# Patient Record
Sex: MALE | Race: WHITE | NOT HISPANIC OR LATINO | Employment: OTHER | URBAN - NONMETROPOLITAN AREA
[De-identification: names, ages, dates, MRNs, and addresses within clinical notes are randomized per-mention and may not be internally consistent; named-entity substitution may affect disease eponyms.]

---

## 2017-01-03 ENCOUNTER — HISTORY (OUTPATIENT)
Dept: FAMILY MEDICINE | Facility: OTHER | Age: 67
End: 2017-01-03

## 2017-01-03 ENCOUNTER — OFFICE VISIT - GICH (OUTPATIENT)
Dept: FAMILY MEDICINE | Facility: OTHER | Age: 67
End: 2017-01-03

## 2017-01-03 DIAGNOSIS — R97.20 ELEVATED PROSTATE SPECIFIC ANTIGEN (PSA): ICD-10-CM

## 2017-01-03 DIAGNOSIS — E03.9 HYPOTHYROIDISM: ICD-10-CM

## 2017-01-03 DIAGNOSIS — B00.1 HERPESVIRAL VESICULAR DERMATITIS: ICD-10-CM

## 2017-01-03 DIAGNOSIS — K21.9 GASTRO-ESOPHAGEAL REFLUX DISEASE WITHOUT ESOPHAGITIS: ICD-10-CM

## 2017-01-03 DIAGNOSIS — I10 ESSENTIAL (PRIMARY) HYPERTENSION: ICD-10-CM

## 2017-01-03 DIAGNOSIS — Z00.00 ENCOUNTER FOR GENERAL ADULT MEDICAL EXAMINATION WITHOUT ABNORMAL FINDINGS: ICD-10-CM

## 2017-01-03 DIAGNOSIS — T75.3XXA MOTION SICKNESS: ICD-10-CM

## 2017-01-03 LAB
ANION GAP - HISTORICAL: 7 (ref 5–18)
BUN SERPL-MCNC: 21 MG/DL (ref 7–25)
BUN/CREAT RATIO - HISTORICAL: 15
CALCIUM SERPL-MCNC: 10.3 MG/DL (ref 8.6–10.3)
CHLORIDE SERPLBLD-SCNC: 106 MMOL/L (ref 98–107)
CO2 SERPL-SCNC: 25 MMOL/L (ref 21–31)
CREAT SERPL-MCNC: 1.41 MG/DL (ref 0.7–1.3)
GFR IF NOT AFRICAN AMERICAN - HISTORICAL: 50 ML/MIN/1.73M2
GLUCOSE SERPL-MCNC: 112 MG/DL (ref 70–105)
POTASSIUM SERPL-SCNC: 3.9 MMOL/L (ref 3.5–5.1)
PSA TOTAL (DIAGNOSTIC) - HISTORICAL: 4.53 NG/ML
SODIUM SERPL-SCNC: 138 MMOL/L (ref 133–143)
TSH - HISTORICAL: 1.14 UIU/ML (ref 0.34–5.6)

## 2017-01-03 ASSESSMENT — PATIENT HEALTH QUESTIONNAIRE - PHQ9: SUM OF ALL RESPONSES TO PHQ QUESTIONS 1-9: 1

## 2017-01-09 ENCOUNTER — HISTORY (OUTPATIENT)
Dept: UROLOGY | Facility: OTHER | Age: 67
End: 2017-01-09

## 2017-01-09 ENCOUNTER — OFFICE VISIT - GICH (OUTPATIENT)
Dept: UROLOGY | Facility: OTHER | Age: 67
End: 2017-01-09

## 2017-01-09 DIAGNOSIS — R39.12 POOR URINARY STREAM: ICD-10-CM

## 2017-01-09 DIAGNOSIS — N52.9 MALE ERECTILE DYSFUNCTION: ICD-10-CM

## 2017-01-09 DIAGNOSIS — R97.20 ELEVATED PROSTATE SPECIFIC ANTIGEN (PSA): ICD-10-CM

## 2017-01-09 DIAGNOSIS — N40.1 ENLARGED PROSTATE WITH LOWER URINARY TRACT SYMPTOMS (LUTS): ICD-10-CM

## 2017-01-18 ENCOUNTER — AMBULATORY - GICH (OUTPATIENT)
Dept: LAB | Facility: OTHER | Age: 67
End: 2017-01-18

## 2017-01-18 ENCOUNTER — AMBULATORY - GICH (OUTPATIENT)
Dept: FAMILY MEDICINE | Facility: OTHER | Age: 67
End: 2017-01-18

## 2017-01-18 DIAGNOSIS — Z20.818 CONTACT WITH AND (SUSPECTED) EXPOSURE TO OTHER BACTERIAL COMMUNICABLE DISEASES: ICD-10-CM

## 2017-01-18 LAB — STREP A ANTIGEN - HISTORICAL: NEGATIVE

## 2017-10-19 ENCOUNTER — COMMUNICATION - GICH (OUTPATIENT)
Dept: FAMILY MEDICINE | Facility: OTHER | Age: 67
End: 2017-10-19

## 2017-10-19 DIAGNOSIS — E03.9 HYPOTHYROIDISM: ICD-10-CM

## 2017-10-19 DIAGNOSIS — B00.1 HERPESVIRAL VESICULAR DERMATITIS: ICD-10-CM

## 2017-12-18 ENCOUNTER — AMBULATORY - GICH (OUTPATIENT)
Dept: UROLOGY | Facility: OTHER | Age: 67
End: 2017-12-18

## 2017-12-18 DIAGNOSIS — R97.20 ELEVATED PROSTATE SPECIFIC ANTIGEN (PSA): ICD-10-CM

## 2017-12-28 NOTE — TELEPHONE ENCOUNTER
Patient Information     Patient Name MRN David Deleon [id number] Male 1950      Telephone Encounter by Terri Us RN at 10/23/2017  2:57 PM     Author:  Terri Us RN Service:  (none) Author Type:  NURS- Registered Nurse     Filed:  10/23/2017  2:58 PM Encounter Date:  10/19/2017 Status:  Signed     :  Terri Us RN (NURS- Registered Nurse)            Filled 2017 #90 R3  Unable to complete prescription refill per RN Medication Refill Policy.................... Terri Us RN ....................  10/23/2017   2:58 PM        `

## 2018-01-02 NOTE — NURSING NOTE
Patient Information     Patient Name MRN Sex David Martin [id number] Male 1950      Nursing Note by Le Scott RN at 2017  9:15 AM     Author:  Le Scott RN Service:  (none) Author Type:  NURS- Registered Nurse     Filed:  2017  8:45 AM Encounter Date:  2017 Status:  Signed     :  Le Scott RN (NURS- Registered Nurse)            Review of Systems:    Weight loss:    No     Recent fever/chills:  No   Night sweats:   No  Current skin rash:  No   Recent hair loss:  No  Heat intolerance:  No   Cold intolerance:  No  Chest pain:   No   Palpitations:   No  Shortness of breath:  No   Wheezing:   No  Constipation:    No   Diarrhea:   No   Nausea:   No   Vomiting:   No   Kidney/side pain:  No   Back pain:   No  Frequent headaches:  No   Dizziness:     No  Leg swelling:   No   Calf pain:    No

## 2018-01-02 NOTE — PROGRESS NOTES
Patient Information     Patient Name MRN Sex David Martin [id number] Male 1950      Progress Notes by Fady Penaloza MD at 1/3/2017  8:49 AM     Author:  Fady Penaloza MD Service:  (none) Author Type:  Physician     Filed:  1/3/2017  1:06 PM Encounter Date:  1/3/2017 Status:  Signed     :  Fady Penaloza MD (Physician)              SUBJECTIVE:    David Samaniego is a 66 y.o. male who presents for px    HPI: He will be doing his first cruise soon and wants something for motion sickness.  Otherwise feels great.  No chest pain or shortness of breath at all.  Needs refills on all of his medications.  Weight is stable.    PROBLEM LIST:  Patient Active Problem List     Diagnosis  Code     HYPOTHYROIDISM E03.9     HYPERTENSION I10     GERD K21.9     Personal history of other specified diseases(V13.89) Z87.898     RENAL INSUFFICIENCY N25.9     RENAL DISEASE, CHRONIC, STAGE III N18.3     DISTURBANCE OF SKIN SENSATION R20.9     HYPERTENSION, LABILE I10     SKIN LESIONS, MULTIPLE L98.9     BACK PAIN M54.9     NODULAR PROSTATE WITH URINARY OBSTRUCTION N40.3, N13.8     SKIN LESION L98.9     ELEVATED PROSTATE SPECIFIC ANTIGEN R97.20     ACP (advance care planning) Z71.89     Lumbar disc herniation M51.26     Health care maintenance Z00.00     H/O adenomatous polyp of colon Z86.010     PAST MEDICAL HISTORY:  Past Medical History     Diagnosis  Date     Benign enlargement of prostate      Chronic kidney disease, stage III (moderate)      Disorder resulting from impaired renal function      Gastroesophageal reflux disease      Hypertension      Hypothyroid      Tortuous colon      SURGICAL HISTORY:  Past Surgical History       Procedure   Laterality Date     Partial thyroidectomy        Back surgery    and      Colonoscopy screening   05     next due        Anesthesia alert        Notes no prior complications from anesthesia.:        Colonoscopy diagnostic   10/26/15     F/U           SOCIAL HISTORY:  Social History     Social History        Marital status:       Spouse name: N/A     Number of children:  N/A     Years of education:  N/A     Occupational History      Not on file.     Social History Main Topics         Smoking status:   Never Smoker     Smokeless tobacco:   Never Used     Alcohol use   0.0 oz/week     0 Standard drinks or equivalent per week        Comment: occasionally      Drug use:   No     Sexual activity:   Not on file     Other Topics  Concern     Not on file      Social History Narrative     Alcohol Use - yes        Works out 3 days per week.  Retired.        Preload 03/08/13     FAMILY HISTORY:  Family History       Problem   Relation Age of Onset     Cancer-breast  Mother      and progressed to brain cancer       Stroke  Mother      Other  Father      Parkinson /dementia       Heart Disease  No Family History      Diabetes  No Family History       CURRENT MEDICATIONS:   Current Outpatient Prescriptions       Medication  Sig Dispense Refill     acyclovir (ZOVIRAX) 400 mg tablet 1 tablet daily 90 tablet 3     hydroCHLOROthiazide (HCTZ) 25 mg tablet Take 1 tablet by mouth once daily. 90 tablet 3     levothyroxine (SYNTHROID) 150 mcg tablet Take 1 tablet by mouth before breakfast. 90 tablet 3     omeprazole (PRILOSEC) 20 mg Delayed-Release capsule Take 1 capsule by mouth once daily before a meal. 90 capsule 4     No current facility-administered medications for this visit.      Medications have been reviewed by me and are current to the best of my knowledge and ability.    ALLERGIES:  Review of patient's allergies indicates no known allergies.    REVIEW OF SYSTEMS:  General: denies any general problems.  Eyes: denies problems  Ears/Nose/Throat: denies problems  Cardiovascular: denies problems  Respiratory: denies problems  Gastrointestinal: denies problems  Genitourinary: denies problems  Musculoskeletal: denies problems  Skin: denies problems  Neurologic:  denies problems  Psychiatric: denies problems  Endocrine: denies problems  Heme/Lymphatic: denies problems  Allergic/Immunologic: denies problems  PHQ Depression Screening 1/3/2017   Date of PHQ exam (doc flow) 1/3/2017   1. Lack of interest/pleasure 0 - Not at all   2. Feeling down/depressed 0 - Not at all   PHQ-2 TOTAL SCORE 0   3. Trouble sleeping 1 - Several days   4. Decreased energy 0 - Not at all   5. Appetite change 0 - Not at all   6. Feelings of failure 0 - Not at all   7. Trouble concentrating 0 - Not at all   8. Activity level 0 - Not at all   9. Hurting yourself 0 - Not at all   PHQ-9 TOTAL SCORE 1   PHQ-9 Severity Level none   Functional Impairment not difficult at all      OBJECTIVE:  /70  Pulse 66  Resp 16  Wt 100.4 kg (221 lb 6.4 oz)  BMI 26.25 kg/m2  EXAM:   General Appearance: Pleasant, alert, appropriate appearance for age. No acute distress  Head Exam: Normal. Normocephalic, atraumatic.  Eye Exam:  Normal external eye, conjunctiva, lids, cornea. DARY.  Ear Exam: Normal TM's bilaterally. Normal auditory canals and external ears. Non-tender.  Nose Exam: Normal external nose, mucus membranes, and septum.  OroPharynx Exam:  Dental hygiene adequate. Normal buccal mucosa. Normal pharynx.  Neck Exam:  Supple, no masses or nodes.  Thyroid Exam: No nodules or enlargement.  Chest/Respiratory Exam: Normal chest wall and respirations. Clear to auscultation.  Breast Exam: No dimpling, nipple retraction or discharge. No masses or nodes.  Cardiovascular Exam: Regular rate and rhythm. S1, S2, no murmur, click, gallop, or rubs.  Gastrointestinal Exam: Soft, non-tender, no masses or organomegaly.  Rectal Exam: prostate nodule on left lobe.  Not tender  Genitourinary Exam Male: Normal male genitalia. No discharge or penile ulcerations. No testicular masses or swelling.  Skin: no rash or abnormalities  Neurologic Exam: Nonfocal, symmetric DTRs, normal gross motor, tone coordination and no tremor.  Psychiatric  Exam: Alert and oriented - appropriate affect.    ASSESSMENT/PLAN:    ICD-10-CM    1. Routine medical exam Z00.00    2. Herpes labialis B00.1 acyclovir (ZOVIRAX) 400 mg tablet   3. Essential hypertension I10 hydroCHLOROthiazide (HCTZ) 25 mg tablet      BASIC METABOLIC PANEL   4. Hypothyroidism, unspecified type E03.9 levothyroxine (SYNTHROID) 150 mcg tablet      TSH   5. Gastroesophageal reflux disease, esophagitis presence not specified K21.9 omeprazole (PRILOSEC) 20 mg Delayed-Release capsule     Mr. Harrell Body mass index is 26.25 kg/(m^2). This is within the normal range for a 66 y.o. Normal range for ages 18-64 is between 18.5 and 24.9; normal range for ages 65+ is 23-30.  BP Readings from Last 1 Encounters:01/03/17 : 128/70  Mr. Harrell blood pressure is out of the normal range for adults. Per JNC-8 guidelines normal adult blood pressure is < 120/80, pre-hypertensive is between 120/80 and 139/89, and hypertension is 140/90 or greater. Risks of hypertension were discussed. Patient's strategy will be to recheck blood pressure in 12 months    I refilled all of his medications.  Prostate nodule feels a bit larger, has a follow up with urology next week.  Follow up with me in 1 year otherwise.    Fady Penaloza MD ....................  1/3/2017   9:01 AM

## 2018-01-02 NOTE — PROGRESS NOTES
"Patient Information     Patient Name MRDavid Elias [id number] Male 1950      Progress Notes by Art Liu MD at 2017  9:15 AM     Author:  Art Liu MD Service:  (none) Author Type:  Physician     Filed:  2017  9:27 AM Encounter Date:  2017 Status:  Signed     :  Art Liu MD (Physician)            Type of Visit  Established    Chief Complaint  Elevated PSA  ED  BPH    HPI  Mr. Samaniego is a 66 y.o. male with a history of an elevated PSA, ED and BPH.  He underwent a negative biopsy in  for a PSA of 6.75.  He does not have family history of prostate cancer.  He has been undergoing annual PSA checks since the biopsy almost 5 years ago.    He still reports BPH symptoms.  Primary symptom is weak stream.  He was on Flomax for many years but discontinued previously and noticed his symptoms did not worsen.  Complaint has persisted but is low bother and stable.    He also has ED and has been using Viagra 100mg (half tablet) through the [facility].  His erections have been 7-8/10 with the Viagra 50mg.  He denies side effects.      Review of Systems  I reviewed the ROS the patient today.    Nursing Notes:   Le Scott RN  2017  8:45 AM  Signed  Review of Systems:    Weight loss:    No     Recent fever/chills:  No   Night sweats:   No  Current skin rash:  No   Recent hair loss:  No  Heat intolerance:  No   Cold intolerance:  No  Chest pain:   No   Palpitations:   No  Shortness of breath:  No   Wheezing:   No  Constipation:    No   Diarrhea:   No   Nausea:   No   Vomiting:   No   Kidney/side pain:  No   Back pain:   No  Frequent headaches:  No   Dizziness:     No  Leg swelling:   No   Calf pain:    No    Physical Exam  Vitals:      17 0842 17 0849   BP: 138/98 142/90   Pulse: 60    Resp: 16    Weight: 101.9 kg (224 lb 9.6 oz)    Height: 1.943 m (6' 4.5\")       Constitutional: NAD, WDWN.  Cardiovascular: Regular rate.  Pulmonary/Chest: Respirations are even " and non-labored bilaterally.  Abdominal: Soft. No distension, tenderness, masses or guarding. No CVA tenderness.  Extremities: JUANI x 4, Warm. No clubbing.  No cyanosis.    Genitourinary: nonpalpable bladder  Genitourinary  Normal male phallus without discharge or lesions.    Normal pubic hair distribution.  Testicles descended bilaterally.  Prostate:  Normal rectal tone, 40-50 grams.    Symmetric, non-tender, anodular and no induration.      Labs  CREATININE (mg/dL)    Date Value   01/03/2017 1.41 (H)       3/21/2014  6.71 ng/mL  (6.75 in 2012 prior to prostate biopsy)    Results for ALAN GOMEZ (MRN [id number]) as of 1/9/2017 08:36   12/26/2013 09:50 7/29/2014 08:40 1/13/2015 11:18 1/13/2016 10:00 1/3/2017 09:14   PSA TOTAL (DIAGNOSTIC) 3.90       PSA TOTAL (DIAGNOSTIC)  5.020 (H) 4.440 (H) 5.843 (H) 4.527 (H)       Assessment  Mr. Gomez is a 66 y.o. male who presents with a history of elevated PSA and previous negative biopsy.   He also has BPH and ED.  We again discussed his PSA- will continue to monitor and defer repeat biopsy   Need to continue to monitor given the PSA trend  YAAN normal recently.    Plan  Continue Viagra for ED  Follow up in 1 year with a PSA after appt with Dr Penaloza

## 2018-01-24 ENCOUNTER — AMBULATORY - GICH (OUTPATIENT)
Dept: LAB | Facility: OTHER | Age: 68
End: 2018-01-24

## 2018-01-24 ENCOUNTER — OFFICE VISIT - GICH (OUTPATIENT)
Dept: FAMILY MEDICINE | Facility: OTHER | Age: 68
End: 2018-01-24

## 2018-01-24 ENCOUNTER — HISTORY (OUTPATIENT)
Dept: FAMILY MEDICINE | Facility: OTHER | Age: 68
End: 2018-01-24

## 2018-01-24 ENCOUNTER — AMBULATORY - GICH (OUTPATIENT)
Dept: FAMILY MEDICINE | Facility: OTHER | Age: 68
End: 2018-01-24

## 2018-01-24 DIAGNOSIS — I10 ESSENTIAL (PRIMARY) HYPERTENSION: ICD-10-CM

## 2018-01-24 DIAGNOSIS — E03.9 HYPOTHYROIDISM: ICD-10-CM

## 2018-01-24 DIAGNOSIS — N40.1 ENLARGED PROSTATE WITH LOWER URINARY TRACT SYMPTOMS (LUTS): ICD-10-CM

## 2018-01-24 DIAGNOSIS — R97.20 ELEVATED PROSTATE SPECIFIC ANTIGEN (PSA): ICD-10-CM

## 2018-01-24 DIAGNOSIS — R07.89 OTHER CHEST PAIN: ICD-10-CM

## 2018-01-24 DIAGNOSIS — K21.9 GASTRO-ESOPHAGEAL REFLUX DISEASE WITHOUT ESOPHAGITIS: ICD-10-CM

## 2018-01-24 DIAGNOSIS — R39.12 POOR URINARY STREAM: ICD-10-CM

## 2018-01-24 DIAGNOSIS — B00.1 HERPESVIRAL VESICULAR DERMATITIS: ICD-10-CM

## 2018-01-24 LAB
ANION GAP - HISTORICAL: 8 (ref 5–18)
BUN SERPL-MCNC: 28 MG/DL (ref 7–25)
BUN/CREAT RATIO - HISTORICAL: 16
CALCIUM SERPL-MCNC: 10.1 MG/DL (ref 8.6–10.3)
CHLORIDE SERPLBLD-SCNC: 106 MMOL/L (ref 98–107)
CHOL/HDL RATIO - HISTORICAL: 3.29
CHOLESTEROL TOTAL: 194 MG/DL
CO2 SERPL-SCNC: 25 MMOL/L (ref 21–31)
CREAT SERPL-MCNC: 1.78 MG/DL (ref 0.7–1.3)
GFR IF NOT AFRICAN AMERICAN - HISTORICAL: 38 ML/MIN/1.73M2
GLUCOSE SERPL-MCNC: 103 MG/DL (ref 70–105)
HDLC SERPL-MCNC: 59 MG/DL (ref 23–92)
LDLC SERPL CALC-MCNC: 121 MG/DL
NON-HDL CHOLESTEROL - HISTORICAL: 135 MG/DL
POTASSIUM SERPL-SCNC: 3.9 MMOL/L (ref 3.5–5.1)
PROVIDER ORDERDED STATUS - HISTORICAL: ABNORMAL
PSA TOTAL (DIAGNOSTIC) - HISTORICAL: 5.5 NG/ML
SODIUM SERPL-SCNC: 139 MMOL/L (ref 133–143)
TRIGL SERPL-MCNC: 68 MG/DL
TSH - HISTORICAL: 5.86 UIU/ML (ref 0.34–5.6)

## 2018-01-25 ENCOUNTER — HISTORY (OUTPATIENT)
Dept: UROLOGY | Facility: OTHER | Age: 68
End: 2018-01-25

## 2018-01-25 ENCOUNTER — OFFICE VISIT - GICH (OUTPATIENT)
Dept: UROLOGY | Facility: OTHER | Age: 68
End: 2018-01-25

## 2018-01-25 DIAGNOSIS — R97.20 ELEVATED PROSTATE SPECIFIC ANTIGEN (PSA): ICD-10-CM

## 2018-01-25 DIAGNOSIS — N52.9 MALE ERECTILE DYSFUNCTION: ICD-10-CM

## 2018-01-27 VITALS
BODY MASS INDEX: 25.75 KG/M2 | HEART RATE: 66 BPM | DIASTOLIC BLOOD PRESSURE: 70 MMHG | SYSTOLIC BLOOD PRESSURE: 128 MMHG | RESPIRATION RATE: 16 BRPM | BODY MASS INDEX: 26.52 KG/M2 | SYSTOLIC BLOOD PRESSURE: 142 MMHG | WEIGHT: 221.4 LBS | DIASTOLIC BLOOD PRESSURE: 90 MMHG | RESPIRATION RATE: 16 BRPM | HEIGHT: 77 IN | HEART RATE: 60 BPM | WEIGHT: 224.6 LBS

## 2018-02-02 ASSESSMENT — PATIENT HEALTH QUESTIONNAIRE - PHQ9: SUM OF ALL RESPONSES TO PHQ QUESTIONS 1-9: 1

## 2018-02-09 VITALS
BODY MASS INDEX: 27.49 KG/M2 | WEIGHT: 232.8 LBS | HEART RATE: 66 BPM | SYSTOLIC BLOOD PRESSURE: 136 MMHG | HEIGHT: 77 IN | DIASTOLIC BLOOD PRESSURE: 70 MMHG | RESPIRATION RATE: 16 BRPM

## 2018-02-09 VITALS
BODY MASS INDEX: 28.11 KG/M2 | SYSTOLIC BLOOD PRESSURE: 136 MMHG | DIASTOLIC BLOOD PRESSURE: 76 MMHG | RESPIRATION RATE: 16 BRPM | WEIGHT: 234 LBS

## 2018-02-13 NOTE — PROGRESS NOTES
Patient Information     Patient Name MRN David Deleon [id number] Male 1950      Progress Notes by Art Liu MD at 2018  9:30 AM     Author:  Art Liu MD Service:  (none) Author Type:  Physician     Filed:  2018 10:19 AM Encounter Date:  2018 Status:  Signed     :  Art Liu MD (Physician)            Type of Visit  Established    Chief Complaint  Elevated PSA  ED  BPH    HPI  Mr. Samaniego is a 67 y.o. male with a history of an elevated PSA, ED and BPH.  He underwent a negative biopsy in  for a PSA of 6.75.  He does not have family history of prostate cancer.  He has been undergoing annual PSA checks since the biopsy almost 6 years ago.  He again reports no changes in health history in the last year.    His urinary symptoms are stable.  Primary complaint was weak stream and he takes Flomax 0.4 mg once daily.  He denies side effects such as lightheadedness.  He believes the benefit for this medication is marginal.  His urinary symptom quality-of-life impact is low.    He also has ED and has been using Viagra 100mg (half tablet) through the [facility].  His erections have been 7-9/10 with the Viagra 50mg.  He denies side effects, flushing      Review of Systems  I reviewed the ROS the patient today.    Nursing Notes:   Linh Ellis  2018  9:35 AM  Signed  Here for one year follow up on PSA.  Review of Systems:    Weight loss:    No     Recent fever/chills:  No   Night sweats:   No  Current skin rash:  No   Recent hair loss:  No  Heat intolerance:  No   Cold intolerance:  No  Chest pain:   No   Palpitations:   No  Shortness of breath:  No   Wheezing:   No  Constipation:    No   Diarrhea:   No   Nausea:   No   Vomiting:   No   Kidney/side pain:  No   Back pain:   No  Frequent headaches:  No   Dizziness:     No  Leg swelling:   No   Calf pain:    No      Linh Ellis LPN  2018  9:29 AM            Physical Exam  Vitals:     18 0929   BP: 136/76    Cuff Site: Right Arm   Position: Sitting   Cuff Size: Adult Large   Resp: 16   Weight: 106.1 kg (234 lb)      Constitutional: NAD, WDWN.  Cardiovascular: Regular rate.  Pulmonary/Chest: Respirations are even and non-labored bilaterally.  Abdominal: Soft. No distension, tenderness, masses or guarding. No CVA tenderness.  Extremities: JUANI x 4, Warm. No clubbing.  No cyanosis.    Genitourinary: nonpalpable bladder  Genitourinary  Normal male phallus without discharge or lesions.    Normal pubic hair distribution.  Testicles descended bilaterally.  Prostate:  Normal rectal tone, 40-50 grams.    Symmetric, non-tender, anodular and no induration.      Labs  CREATININE (mg/dL)    Date Value   01/24/2018 1.78 (H)       3/21/2014  6.71 ng/mL  (6.75 in 2012 prior to prostate biopsy)    Results for ALAN GOMEZ (MRN [id number]) as of 1/25/2018 09:36   7/29/2014 08:40 1/13/2015 11:18 1/13/2016 10:00 1/3/2017 09:14 1/24/2018 08:58   PSA TOTAL (DIAGNOSTIC) 5.020 (H) 4.440 (H) 5.843 (H) 4.527 (H) 5.505 (H)       Assessment  Mr. Gomez is a 67 y.o. male who presents with a history of elevated PSA and previous negative biopsy.   He also has BPH and ED - well managed at this time.  We again discussed his PSA- will continue to monitor and defer repeat biopsy     Plan  Continue Viagra for ED and Flomax for BPH.  Follow up in 1 year with a PSA

## 2018-02-13 NOTE — PROGRESS NOTES
Patient Information     Patient Name MRN Sex David Martin [id number] Male 1950      Progress Notes by Fady Penaloza MD at 2018  9:30 AM     Author:  Fady Penaloza MD Service:  (none) Author Type:  Physician     Filed:  2018  7:53 AM Encounter Date:  2018 Status:  Signed     :  Fady Penaloza MD (Physician)            SUBJECTIVE:    David Samaniego is a 67 y.o. male who presents for follow up on medications and now with chest pain     HPI    Has had some chest pain for a few years.  On left side into left shoulder typically.  Has always been brief and mild.  Over [place] he had to drive to [place] in a snow storm.  After 8 hours of driving, the pain in the arm was significant.  since then he notes some shortness of breath with mild activity like taking down their tree.  He exercises every other day heavily and has never had dyspnea on exertion, shortness of breath or chest pain with this.  Had a normal stress echocardiogram here 1/3/14.  No shortness of breath or chest pain with blowing snow or shoveling it either.    Due for refills on the blood pressure medications as well as the synthroid.  Has a home cuff, and has been checking it off and on,.  Has been up to 160/xx at times.    Weight is up a little in the winter, which is typical for him.  No diarrhea or constipation.    Has dropped the omeprazole to every other day.  Is tapering down and notes no gastroesophogeal reflux disease symptoms at the lower dosing.    [facility] Is not working great.  Seeing Liu tomorrow.    No Known Allergies,   Current Outpatient Prescriptions on File Prior to Visit       Medication  Sig Dispense Refill     scopolamine 1.5mg (TRANSDERM SCOP) 1.5 mg (1 mg over 3 days) patch Apply 1 Patch on dry, clean, hairless skin every 72 hours. 5 Patch 0     No current facility-administered medications on file prior to visit.    ,   Past Medical History:     Diagnosis  Date     Benign  "enlargement of prostate      Chronic kidney disease, stage III (moderate)      Disorder resulting from impaired renal function      Gastroesophageal reflux disease      Hypertension      Hypothyroid      Tortuous colon     and   Past Surgical History:      Procedure  Laterality Date     ANESTHESIA ALERT      Notes no prior complications from anesthesia.:        BACK SURGERY  1986 and 1987     COLONOSCOPY DIAGNOSTIC  10/26/15    F/U 2020       COLONOSCOPY SCREENING  7/29/05    next due 2015       PARTIAL THYROIDECTOMY         REVIEW OF SYSTEMS:  Review of Systems   Constitutional: Negative for chills, fever and weight loss.   HENT: Negative for hearing loss.    Eyes: Negative for blurred vision, double vision and photophobia.   Respiratory: Negative for cough and shortness of breath.    Cardiovascular: Positive for chest pain. Negative for palpitations, orthopnea and claudication.   Gastrointestinal: Negative for abdominal pain, nausea and vomiting.   Genitourinary: Positive for urgency. Negative for dysuria.   Musculoskeletal: Positive for joint pain.   Skin: Negative for itching and rash.   Neurological: Negative for dizziness and headaches.   Endo/Heme/Allergies: Does not bruise/bleed easily.   Psychiatric/Behavioral: Negative for depression.       OBJECTIVE:  /70 (Cuff Site: Right Arm, Position: Sitting, Cuff Size: Adult Large)  Pulse 66  Resp 16  Ht 1.943 m (6' 4.5\")  Wt 105.6 kg (232 lb 12.8 oz)  BMI 27.97 kg/m2    EXAM:   Physical Exam   Constitutional: He is oriented to person, place, and time and well-developed, well-nourished, and in no distress. No distress.   HENT:   Head: Normocephalic and atraumatic.   Eyes: Conjunctivae are normal. Pupils are equal, round, and reactive to light.   Neck: Normal range of motion. No tracheal deviation present. No thyromegaly present.   Cardiovascular: Normal rate, regular rhythm and normal heart sounds.  Exam reveals no gallop and no friction rub.    No murmur " heard.  Pulmonary/Chest: Effort normal. No respiratory distress. He has no wheezes. He has no rales.   Abdominal: Soft. He exhibits no distension. There is no tenderness. There is no rebound and no guarding.   Musculoskeletal: Normal range of motion. He exhibits no edema or tenderness.   Neurological: He is alert and oriented to person, place, and time. Coordination normal.   Skin: Skin is warm and dry. No rash noted. He is not diaphoretic. No erythema.   Psychiatric: Memory, affect and judgment normal.     EKG is sinus bradycardia at 58.  1st degree block, otherwise normal.    Results for orders placed or performed in visit on 01/24/18      PSA TOTAL (DIAGNOSTIC)      Result  Value Ref Range    PSA TOTAL (DIAGNOSTIC) 5.505 (H) <=3.100 ng/mL   '    ASSESSMENT/PLAN:    ICD-10-CM    1. Atypical chest pain R07.89 EKG 12 LEAD      LIPID PANEL   2. Herpes labialis B00.1 acyclovir (ZOVIRAX) 400 mg tablet      DISCONTINUED: acyclovir (ZOVIRAX) 400 mg tablet   3. Essential hypertension I10 hydroCHLOROthiazide (HCTZ) 25 mg tablet      BASIC METABOLIC PANEL   4. Hypothyroidism, unspecified type E03.9 TSH      levothyroxine (SYNTHROID) 150 mcg tablet      DISCONTINUED: levothyroxine (SYNTHROID) 150 mcg tablet   5. Gastroesophageal reflux disease, esophagitis presence not specified K21.9 omeprazole (PRILOSEC) 20 mg Delayed-Release capsule   6. Benign prostatic hyperplasia with weak urinary stream N40.1      R39.12         Plan:  Cardiac exam is normal.  Is able to tolerate heavy exercise without symptoms, and had a normal stress test 4 years ago.  Given all of this, discussed with him the low probability the pain is cardiac.  We are comfortable for now observing, and if it becomes more classic, then repeating the stress testing.  Refilled above medications.  will have him stop the [employer], and if symptoms return in 6 weeks or so, then needs an EGD.       Results for orders placed or performed in visit on 01/24/18       BASIC METABOLIC PANEL      Result  Value Ref Range    SODIUM 139 133 - 143 mmol/L    POTASSIUM 3.9 3.5 - 5.1 mmol/L    CHLORIDE 106 98 - 107 mmol/L    CO2,TOTAL 25 21 - 31 mmol/L    ANION GAP 8 5 - 18                    GLUCOSE 103 70 - 105 mg/dL    CALCIUM 10.1 8.6 - 10.3 mg/dL    BUN 28 (H) 7 - 25 mg/dL    CREATININE 1.78 (H) 0.70 - 1.30 mg/dL    BUN/CREAT RATIO           16                    GFR if African American 46 (L) >60 ml/min/1.73m2    GFR if not  38 (L) >60 ml/min/1.73m2   TSH      Result  Value Ref Range    TSH 5.86 (H) 0.34 - 5.60 uIU/mL   LIPID PANEL      Result  Value Ref Range    CHOLESTEROL,TOTAL 194 <200 mg/dL    TRIGLYCERIDES 68 <150 mg/dL    HDL CHOLESTEROL 59 23 - 92 mg/dL    NON-HDL CHOLESTEROL 135 <145 mg/dl    CHOL/HDL RATIO            3.29 <4.50                    LDL CHOLESTEROL 121 (H) <100 mg/dL    PROVIDER ORDERED STATUS FASTING      Fady Penaloza MD ....................  1/29/2018   7:52 AM

## 2018-02-13 NOTE — NURSING NOTE
Patient Information     Patient Name MRN David Deleon [id number] Male 1950      Nursing Note by Monica Sullivan at 2018  9:30 AM     Author:  Monica Sullivan Service:  (none) Author Type:  (none)     Filed:  2018  9:21 AM Encounter Date:  2018 Status:  Signed     :  Monica Sullivan            Coming in for a check up, medication   Monica Sullivan ....................  2018   9:09 AM

## 2018-02-13 NOTE — NURSING NOTE
Patient Information     Patient Name MRN David Deleon [id number] Male 1950      Nursing Note by Linh Ellis at 2018  9:30 AM     Author:  Linh Ellis Service:  (none) Author Type:  (none)     Filed:  2018  9:35 AM Encounter Date:  2018 Status:  Signed     :  Linh Ellis            Here for one year follow up on PSA.  Review of Systems:    Weight loss:    No     Recent fever/chills:  No   Night sweats:   No  Current skin rash:  No   Recent hair loss:  No  Heat intolerance:  No   Cold intolerance:  No  Chest pain:   No   Palpitations:   No  Shortness of breath:  No   Wheezing:   No  Constipation:    No   Diarrhea:   No   Nausea:   No   Vomiting:   No   Kidney/side pain:  No   Back pain:   No  Frequent headaches:  No   Dizziness:     No  Leg swelling:   No   Calf pain:    No      Linh Ellis LPN  2018  9:29 AM

## 2018-02-22 ENCOUNTER — DOCUMENTATION ONLY (OUTPATIENT)
Dept: FAMILY MEDICINE | Facility: OTHER | Age: 68
End: 2018-02-22

## 2018-02-22 PROBLEM — I10 HYPERTENSION: Status: ACTIVE | Noted: 2018-02-22

## 2018-02-22 PROBLEM — R39.12 BENIGN PROSTATIC HYPERPLASIA WITH WEAK URINARY STREAM: Status: ACTIVE | Noted: 2017-01-09

## 2018-02-22 PROBLEM — N52.9 ERECTILE DYSFUNCTION: Status: ACTIVE | Noted: 2017-01-09

## 2018-02-22 PROBLEM — K21.9 ESOPHAGEAL REFLUX: Status: ACTIVE | Noted: 2018-02-22

## 2018-02-22 PROBLEM — N25.9 DISORDER RESULTING FROM IMPAIRED RENAL FUNCTION: Status: ACTIVE | Noted: 2018-02-22

## 2018-02-22 PROBLEM — N40.1 BENIGN PROSTATIC HYPERPLASIA WITH WEAK URINARY STREAM: Status: ACTIVE | Noted: 2017-01-09

## 2018-02-22 PROBLEM — Z87.898 HISTORY OF DISEASE: Status: ACTIVE | Noted: 2018-02-22

## 2018-02-22 PROBLEM — E03.9 HYPOTHYROIDISM: Status: ACTIVE | Noted: 2018-02-22

## 2018-02-22 RX ORDER — ACYCLOVIR 400 MG/1
1 TABLET ORAL DAILY
COMMUNITY
Start: 2017-01-03 | End: 2019-01-23

## 2018-02-22 RX ORDER — SCOLOPAMINE TRANSDERMAL SYSTEM 1 MG/1
1 PATCH, EXTENDED RELEASE TRANSDERMAL
COMMUNITY
Start: 2017-01-03 | End: 2018-06-22

## 2018-02-22 RX ORDER — LEVOTHYROXINE SODIUM 150 UG/1
150 TABLET ORAL
COMMUNITY
Start: 2017-01-03 | End: 2019-01-23

## 2018-02-22 RX ORDER — HYDROCHLOROTHIAZIDE 25 MG/1
25 TABLET ORAL DAILY
COMMUNITY
Start: 2017-01-03 | End: 2019-01-23

## 2018-02-23 ENCOUNTER — TRANSFERRED RECORDS (OUTPATIENT)
Dept: HEALTH INFORMATION MANAGEMENT | Facility: OTHER | Age: 68
End: 2018-02-23

## 2018-06-22 ENCOUNTER — OFFICE VISIT (OUTPATIENT)
Dept: FAMILY MEDICINE | Facility: OTHER | Age: 68
End: 2018-06-22
Attending: NURSE PRACTITIONER
Payer: COMMERCIAL

## 2018-06-22 VITALS
TEMPERATURE: 97.5 F | RESPIRATION RATE: 16 BRPM | WEIGHT: 224 LBS | DIASTOLIC BLOOD PRESSURE: 88 MMHG | BODY MASS INDEX: 26.91 KG/M2 | HEART RATE: 58 BPM | SYSTOLIC BLOOD PRESSURE: 138 MMHG
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
## 2018-06-22 PROCEDURE — G0463 HOSPITAL OUTPT CLINIC VISIT: HCPCS

## 2018-06-22 PROCEDURE — 99213 OFFICE O/P EST LOW 20 MIN: CPT | Performed by: NURSE PRACTITIONER

## 2018-06-22 RX ORDER — DOXYCYCLINE HYCLATE 100 MG
200 TABLET ORAL ONCE
Qty: 2 TABLET | Refills: 0 | Status: SHIPPED | OUTPATIENT
Start: 2018-06-22 | End: 2018-06-22

## 2018-06-22 RX ORDER — INFLUENZA A VIRUS A/VICTORIA/4897/2022 IVR-238 (H1N1) ANTIGEN (FORMALDEHYDE INACTIVATED), INFLUENZA A VIRUS A/CALIFORNIA/122/2022 SAN-022 (H3N2) ANTIGEN (FORMALDEHYDE INACTIVATED), AND INFLUENZA B VIRUS B/MICHIGAN/01/2021 ANTIGEN (FORMALDEHYDE INACTIVATED) 60; 60; 60 UG/.5ML; UG/.5ML; UG/.5ML
INJECTION, SUSPENSION INTRAMUSCULAR
Status: ON HOLD | COMMUNITY
Start: 2017-10-02 | End: 2019-07-30

## 2018-06-22 NOTE — NURSING NOTE
Patient presents to clinic for complaints of fatigue, sore neck, and body aches. Symptoms began a few days ago. Patient states this morning he found a deer tick embedded in his left upper thigh.   Jim Damon LPN...... 11:50 AM 6/22/2018

## 2018-06-22 NOTE — MR AVS SNAPSHOT
"              After Visit Summary   6/22/2018    David Samaniego    MRN: [id number]           Patient Information     Date Of Birth          1950        Visit Information        Provider Department      6/22/2018 11:30 AM Tracy Quiroz APRN CNP [facility]        Today's Diagnoses     Tick bite, initial encounter    -  1      Care Instructions      Tick Bites  Ticks are small arachnids that feed on the blood of rodents, rabbits, birds, deer, dogs, and people. A tick bite may cause a reaction like a spider bite. You may have redness, itching, and slight swelling at the site. Sometimes you may have no reaction where the tick bit you.  Ticks may gorge themselves for days before you find and remove them. The bites themselves aren't cause for concern. But ticks can carry and pass on illnesses such as Lyme disease and Harry [place] spotted fever. Both diseases begin with a rash and symptoms similar to the flu. In advanced stages, these diseases can be quite serious.     A \"bull's eye\" rash is a common symptom of Lyme disease.   When to go to the emergency room (ER)  Not all ticks carry disease. And a tick must stay attached for at least 24 hours to infect you. If you find a tick, don't panic. Try to carefully remove it with tweezers. Grasp the tick near its head and pull without twisting. If you can't easily dislodge the tick or if you leave the head in your skin, get medical care right away.  What to expect in the ER    The tick or any parts of the tick will be removed and the bite will be cleaned.    To prevent disease, you may be given antibiotics. Both Lyme disease and Harry [place] spotted fever respond quickly to these medicines.    You may be asked to see your healthcare provider for a blood test to check for Lyme disease.  Follow-up care  Some states and counties have services that test ticks for Lyme disease and other diseases. Check with your local officials to see if this " "service is available in your area.  If you remove a tick yourself, watch for signs of a tick-borne illness. Symptoms may show up within a few days or weeks after a bite. Call your healthcare provider if you notice any of the following:    Rash. The rash may spread outward in a ring from a hard white lump. Or it may move up your arms and legs to your chest.    Chills and fever    Body aches and joint pain    Severe headache  Date Last Reviewed: 12/1/2016 2000-2017 [employer]. [address] [place] [address]. All rights reserved. This information is not intended as a substitute for professional medical care. Always follow your healthcare professional's instructions.                Follow-ups after your visit        Who to contact     If you have questions or need follow up information about today's clinic visit or your schedule please contact [facility] directly at [phone].  Normal or non-critical lab and imaging results will be communicated to you by [employer], letter or phone within 4 business days after the clinic has received the results. If you do not hear from us within 7 days, please contact the clinic through [employer] or phone. If you have a critical or abnormal lab result, we will notify you by phone as soon as possible.  Submit refill requests through [employer] or call your pharmacy and they will forward the refill request to us. Please allow 3 business days for your refill to be completed.          Additional Information About Your Visit        [employer] Information     [employer] lets you send messages to your doctor, view your test results, renew your prescriptions, schedule appointments and more. To sign up, go to [email or web address] . Click on \"Log in\" on the left side of the screen, which will take you to the Welcome page. Then click on \"Sign up Now\" on the right side of the page.     You will be asked to enter the access code listed below, as well as " some personal information. Please follow the directions to create your username and password.     Your access code is: [email or web address]  Expires: 2018 12:15 PM     Your access code will  in 90 days. If you need help or a new code, please call your [place] clinic or [phone].        Care EveryWhere ID     This is your Care EveryWhere ID. This could be used by other organizations to access your [place] medical records  [id number]        Your Vitals Were     Pulse Temperature Respirations BMI (Body Mass Index)          58 97.5  F (36.4  C) (Tympanic) 16 26.91 kg/m2         Blood Pressure from Last 3 Encounters:   18 138/88   18 136/76   18 136/70    Weight from Last 3 Encounters:   18 224 lb (101.6 kg)   18 234 lb (106.1 kg)   18 232 lb 12.8 oz (105.6 kg)              Today, you had the following     No orders found for display         Today's Medication Changes          These changes are accurate as of 18 12:15 PM.  If you have any questions, ask your nurse or doctor.               Start taking these medicines.        Dose/Directions    doxycycline 100 MG tablet   Commonly known as:  VIBRA-TABS   Used for:  Tick bite, initial encounter   Started by:  Tracy Quiroz APRN CNP        Dose:  200 mg   Take 2 tablets (200 mg) by mouth once for 1 dose   Quantity:  2 tablet   Refills:  0         Stop taking these medicines if you haven't already. Please contact your care team if you have questions.     scopolamine 72 hr patch   Commonly known as:  TRANSDERM   Stopped by:  Tracy Quiroz APRN CNP                Where to get your medicines      These medications were sent to [facility] [id number] [facility] [place] -  [address] [facility] [address]     Phone:  [phone]     doxycycline 100 MG tablet                Primary Care Provider Office Phone # Fax #    Fady Penaloza [phone]  [facility] [id number]        Equal Access to Services     LOW DUFF : Hadii harriet Martinez, aiyana arce, lawrence marx, lamine brooks. So [facility] [phone].    ATENCIÓN: Si habla español, tiene a roberson disposición servicios gratuitos de asistencia lingüística. Llame al [phone].    We comply with applicable federal civil rights laws and [place] laws. We do not discriminate on the basis of race, color, national origin, age, disability, sex, sexual orientation, or gender identity.            Thank you!     Thank you for choosing [facility]  for your care. Our goal is always to provide you with excellent care. Hearing back from our patients is one way we can continue to improve our services. Please take a few minutes to complete the written survey that you may receive in the mail after your visit with us. Thank you!             Your Updated Medication List - Protect others around you: Learn how to safely use, store and throw away your medicines at www.disposemymeds.org.          This list is accurate as of 6/22/18 12:15 PM.  Always use your most recent med list.                   Brand Name Dispense Instructions for use Diagnosis    acyclovir 400 MG tablet    ZOVIRAX     Take 1 tablet by mouth daily        doxycycline 100 MG tablet    VIBRA-TABS    2 tablet    Take 2 tablets (200 mg) by mouth once for 1 dose    Tick bite, initial encounter       FLUZONE HIGH-DOSE 0.5 ML injection   Generic drug:  influenza Vac Split High-Dose           hydrochlorothiazide 25 MG tablet    HYDRODIURIL     Take 25 mg by mouth daily        levothyroxine 150 MCG tablet    SYNTHROID/LEVOTHROID     Take 150 mcg by mouth daily (with breakfast)        omeprazole 20 MG CR capsule    priLOSEC     Take 20 mg by mouth daily

## 2018-06-22 NOTE — PATIENT INSTRUCTIONS
"  Tick Bites  Ticks are small arachnids that feed on the blood of rodents, rabbits, birds, deer, dogs, and people. A tick bite may cause a reaction like a spider bite. You may have redness, itching, and slight swelling at the site. Sometimes you may have no reaction where the tick bit you.  Ticks may gorge themselves for days before you find and remove them. The bites themselves aren't cause for concern. But ticks can carry and pass on illnesses such as Lyme disease and Harry Mountain spotted fever. Both diseases begin with a rash and symptoms similar to the flu. In advanced stages, these diseases can be quite serious.     A \"bull's eye\" rash is a common symptom of Lyme disease.   When to go to the emergency room (ER)  Not all ticks carry disease. And a tick must stay attached for at least 24 hours to infect you. If you find a tick, don't panic. Try to carefully remove it with tweezers. Grasp the tick near its head and pull without twisting. If you can't easily dislodge the tick or if you leave the head in your skin, get medical care right away.  What to expect in the ER    The tick or any parts of the tick will be removed and the bite will be cleaned.    To prevent disease, you may be given antibiotics. Both Lyme disease and Harry [place] spotted fever respond quickly to these medicines.    You may be asked to see your healthcare provider for a blood test to check for Lyme disease.  Follow-up care  Some states and [facility] have services that test ticks for Lyme disease and other diseases. Check with your local officials to see if this service is available in your area.  If you remove a tick yourself, watch for signs of a tick-borne illness. Symptoms may show up within a few days or weeks after a bite. Call your healthcare provider if you notice any of the following:    Rash. The rash may spread outward in a ring from a hard white lump. Or it may move up your arms and legs to your chest.    Chills and fever    Body " aches and joint pain    Severe headache  Date Last Reviewed: 12/1/2016 2000-2017 [employer]. [address] [place] [address], [place] [address]. All rights reserved. This information is not intended as a substitute for professional medical care. Always follow your healthcare professional's instructions.

## 2018-06-22 NOTE — PROGRESS NOTES
HPI Comments: Nursing Notes:   Jim Damon LPN  6/22/2018 11:50 AM  Unsigned  Patient presents to clinic for complaints of fatigue, sore neck, and body   aches. Symptoms began a few days ago. Patient states this morning he found   a deer tick embedded in his left upper thigh.   Jim BAILON Marisol ALLEN...... 11:50 AM 6/22/2018      Two days ago woke up feeling down physically, decreased strength, nauseated, fatigue and out of breath. Found a deer tick on back of leg today. Tick attached. No fever, no rash. Tick wasn't engorged. Unsure how long tick was attached.         Review of Systems   Constitutional: Negative for fever.         Physical Exam   Constitutional: He is well-developed, well-nourished, and in no distress.   Skin:   1 cm salmon colored annular area left posterior knee   Psychiatric: Affect normal.     Assessment: well appearing male without fever, 1 cm annular salmon colored area posterior left knee    Diagnosis: Tick Bite    Treat with Doxycycline 200 mgs PO once  Follow up if fevers, body aches, joint pain or bull's eye rash develop

## 2018-06-29 ASSESSMENT — ENCOUNTER SYMPTOMS: FEVER: 0

## 2018-07-23 NOTE — PROGRESS NOTES
Patient Information     Patient Name  David Samaniego MRN  [id number] Sex  Male   1950      Letter by Fady Penaloza MD at      Author:  Fady Penaloza MD Service:  (none) Author Type:  (none)    Filed:   Encounter Date:  2018 Status:  (Other)           David Samaniego  [address]  [place] [address]          2018    Dear Mr. Samaniego:    Following are the tests completed during your last clinic visit.  The results of these tests are normal and require no further attention unless otherwise noted. The TSH is only slightly high, as long as your energy level is normal, no need for med changes on the thyroid.    Results for orders placed or performed in visit on 18      BASIC METABOLIC PANEL      Result  Value Ref Range    SODIUM 139 133 - 143 mmol/L    POTASSIUM 3.9 3.5 - 5.1 mmol/L    CHLORIDE 106 98 - 107 mmol/L    CO2,TOTAL 25 21 - 31 mmol/L    ANION GAP 8 5 - 18                    GLUCOSE 103 70 - 105 mg/dL    CALCIUM 10.1 8.6 - 10.3 mg/dL    BUN 28 (H) 7 - 25 mg/dL    CREATININE 1.78 (H) 0.70 - 1.30 mg/dL    BUN/CREAT RATIO           16                    GFR if African American 46 (L) >60 ml/min/1.73m2    GFR if not  38 (L) >60 ml/min/1.73m2   TSH      Result  Value Ref Range    TSH 5.86 (H) 0.34 - 5.60 uIU/mL   LIPID PANEL      Result  Value Ref Range    CHOLESTEROL,TOTAL 194 <200 mg/dL    TRIGLYCERIDES 68 <150 mg/dL    HDL CHOLESTEROL 59 23 - 92 mg/dL    NON-HDL CHOLESTEROL 135 <145 mg/dl    CHOL/HDL RATIO            3.29 <4.50                    LDL CHOLESTEROL 121 (H) <100 mg/dL    PROVIDER ORDERED STATUS FASTING          If you have any further questions or problems contact my office at  [phone].    Thank you,    Fady Penaloza MD

## 2018-07-24 NOTE — PROGRESS NOTES
Patient Information     Patient Name  David Samaniego MRN  [id number] Sex  Male   1950      Letter by Fady Penaloza MD at      Author:  Fady Penaloza MD Service:  (none) Author Type:  (none)    Filed:   Encounter Date:  1/3/2017 Status:  (Other)           David Samaniego  [address]  [place] [address]          January 3, 2017    Dear Mr. Samaniego:    Following are the tests completed during your last clinic visit.  The results of these tests are normal and require no further attention unless otherwise noted.    Results for orders placed or performed in visit on 17      TSH      Result  Value Ref Range    TSH 1.14 0.34 - 5.60 uIU/mL   BASIC METABOLIC PANEL      Result  Value Ref Range    SODIUM 138 133 - 143 mmol/L    POTASSIUM 3.9 3.5 - 5.1 mmol/L    CHLORIDE 106 98 - 107 mmol/L    CO2,TOTAL 25 21 - 31 mmol/L    ANION GAP 7 5 - 18                    GLUCOSE 112 (H) 70 - 105 mg/dL    CALCIUM 10.3 8.6 - 10.3 mg/dL    BUN 21 7 - 25 mg/dL    CREATININE 1.41 (H) 0.70 - 1.30 mg/dL    BUN/CREAT RATIO           15                    GFR if African American >60 >60 ml/min/1.73m2    GFR if not African American 50 (L) >60 ml/min/1.73m2   PSA TOTAL (DIAGNOSTIC)      Result  Value Ref Range    PSA TOTAL (DIAGNOSTIC) 4.527 (H) <=3.100 ng/mL         If you have any further questions or problems contact my office at  [phone].    Thank you,    Fady Penaloza MD

## 2018-12-27 DIAGNOSIS — R97.20 ELEVATED PROSTATE SPECIFIC ANTIGEN (PSA): Primary | ICD-10-CM

## 2019-01-16 ENCOUNTER — DOCUMENTATION ONLY (OUTPATIENT)
Dept: OTHER | Facility: CLINIC | Age: 69
End: 2019-01-16

## 2019-01-23 ENCOUNTER — OFFICE VISIT (OUTPATIENT)
Dept: FAMILY MEDICINE | Facility: OTHER | Age: 69
End: 2019-01-23
Attending: FAMILY MEDICINE
Payer: COMMERCIAL

## 2019-01-23 VITALS
HEART RATE: 60 BPM | WEIGHT: 228 LBS | BODY MASS INDEX: 26.92 KG/M2 | RESPIRATION RATE: 16 BRPM | HEIGHT: 77 IN | SYSTOLIC BLOOD PRESSURE: 132 MMHG | DIASTOLIC BLOOD PRESSURE: 80 MMHG

## 2019-01-23 DIAGNOSIS — Z00.00 ROUTINE GENERAL MEDICAL EXAMINATION AT A HEALTH CARE FACILITY: Primary | ICD-10-CM

## 2019-01-23 DIAGNOSIS — I10 ESSENTIAL HYPERTENSION: ICD-10-CM

## 2019-01-23 DIAGNOSIS — R20.9 DISTURBANCE OF SKIN SENSATION: ICD-10-CM

## 2019-01-23 DIAGNOSIS — R97.20 ELEVATED PROSTATE SPECIFIC ANTIGEN (PSA): ICD-10-CM

## 2019-01-23 DIAGNOSIS — K21.9 GASTROESOPHAGEAL REFLUX DISEASE WITHOUT ESOPHAGITIS: ICD-10-CM

## 2019-01-23 DIAGNOSIS — E03.9 HYPOTHYROIDISM, UNSPECIFIED TYPE: ICD-10-CM

## 2019-01-23 PROBLEM — Z87.898 HISTORY OF DISEASE: Status: RESOLVED | Noted: 2018-02-22 | Resolved: 2019-01-23

## 2019-01-23 LAB
ANION GAP SERPL CALCULATED.3IONS-SCNC: 5 MMOL/L (ref 3–14)
BUN SERPL-MCNC: 25 MG/DL (ref 7–25)
CALCIUM SERPL-MCNC: 10.4 MG/DL (ref 8.6–10.3)
CHLORIDE SERPL-SCNC: 109 MMOL/L (ref 98–107)
CO2 SERPL-SCNC: 27 MMOL/L (ref 21–31)
CREAT SERPL-MCNC: 1.48 MG/DL (ref 0.7–1.3)
GFR SERPL CREATININE-BSD FRML MDRD: 47 ML/MIN/{1.73_M2}
GLUCOSE SERPL-MCNC: 112 MG/DL (ref 70–105)
POTASSIUM SERPL-SCNC: 4 MMOL/L (ref 3.5–5.1)
PSA SERPL-MCNC: 6.72 NG/ML
SODIUM SERPL-SCNC: 141 MMOL/L (ref 134–144)
TSH SERPL DL<=0.05 MIU/L-ACNC: 1.83 IU/ML (ref 0.34–5.6)

## 2019-01-23 PROCEDURE — 84153 ASSAY OF PSA TOTAL: CPT | Performed by: FAMILY MEDICINE

## 2019-01-23 PROCEDURE — 80048 BASIC METABOLIC PNL TOTAL CA: CPT | Performed by: FAMILY MEDICINE

## 2019-01-23 PROCEDURE — 36415 COLL VENOUS BLD VENIPUNCTURE: CPT | Performed by: FAMILY MEDICINE

## 2019-01-23 PROCEDURE — G0463 HOSPITAL OUTPT CLINIC VISIT: HCPCS

## 2019-01-23 PROCEDURE — 99397 PER PM REEVAL EST PAT 65+ YR: CPT | Performed by: FAMILY MEDICINE

## 2019-01-23 PROCEDURE — 84443 ASSAY THYROID STIM HORMONE: CPT | Performed by: FAMILY MEDICINE

## 2019-01-23 RX ORDER — ACYCLOVIR 400 MG/1
400 TABLET ORAL DAILY
Qty: 90 TABLET | Refills: 3 | Status: SHIPPED | OUTPATIENT
Start: 2019-01-23 | End: 2020-01-23

## 2019-01-23 RX ORDER — LEVOTHYROXINE SODIUM 150 UG/1
150 TABLET ORAL
Qty: 90 TABLET | Refills: 3 | Status: SHIPPED | OUTPATIENT
Start: 2019-01-23 | End: 2019-01-23

## 2019-01-23 RX ORDER — HYDROCHLOROTHIAZIDE 25 MG/1
25 TABLET ORAL DAILY
Qty: 90 TABLET | Refills: 3 | Status: SHIPPED | OUTPATIENT
Start: 2019-01-23 | End: 2019-01-23

## 2019-01-23 RX ORDER — LEVOTHYROXINE SODIUM 150 UG/1
150 TABLET ORAL
Qty: 90 TABLET | Refills: 3 | Status: SHIPPED | OUTPATIENT
Start: 2019-01-23 | End: 2020-01-23

## 2019-01-23 RX ORDER — HYDROCHLOROTHIAZIDE 25 MG/1
25 TABLET ORAL DAILY
Qty: 90 TABLET | Refills: 3 | Status: SHIPPED | OUTPATIENT
Start: 2019-01-23 | End: 2020-01-23

## 2019-01-23 RX ORDER — ACYCLOVIR 400 MG/1
400 TABLET ORAL DAILY
Qty: 90 TABLET | Refills: 3 | Status: SHIPPED | OUTPATIENT
Start: 2019-01-23 | End: 2019-01-23

## 2019-01-23 ASSESSMENT — ANXIETY QUESTIONNAIRES
GAD7 TOTAL SCORE: 0
5. BEING SO RESTLESS THAT IT IS HARD TO SIT STILL: NOT AT ALL
3. WORRYING TOO MUCH ABOUT DIFFERENT THINGS: NOT AT ALL
1. FEELING NERVOUS, ANXIOUS, OR ON EDGE: NOT AT ALL
7. FEELING AFRAID AS IF SOMETHING AWFUL MIGHT HAPPEN: NOT AT ALL
6. BECOMING EASILY ANNOYED OR IRRITABLE: NOT AT ALL
2. NOT BEING ABLE TO STOP OR CONTROL WORRYING: NOT AT ALL

## 2019-01-23 ASSESSMENT — PATIENT HEALTH QUESTIONNAIRE - PHQ9: 5. POOR APPETITE OR OVEREATING: NOT AT ALL

## 2019-01-23 ASSESSMENT — PAIN SCALES - GENERAL: PAINLEVEL: NO PAIN (0)

## 2019-01-23 ASSESSMENT — MIFFLIN-ST. JEOR: SCORE: 1911.58

## 2019-01-23 NOTE — NURSING NOTE
"Coming in for a medication check up    Chief Complaint   Patient presents with     Physical     check up       Initial /80 (BP Location: Right arm, Patient Position: Sitting, Cuff Size: Adult Large)   Pulse 60   Resp 16   Ht 1.956 m (6' 5\")   Wt 103.4 kg (228 lb)   BMI 27.04 kg/m   Estimated body mass index is 27.04 kg/m  as calculated from the following:    Height as of this encounter: 1.956 m (6' 5\").    Weight as of this encounter: 103.4 kg (228 lb).  Medication Reconciliation: complete    Monica Sullivan LPN  "

## 2019-01-23 NOTE — LETTER
January 23, 2019      David Samaniego  [address] [place] [address]        Dear David,     The PSA is slightly rising, but still not worrisome.  The rate of changes is slow.    Results for orders placed or performed in visit on 01/23/19   Prostate Specific Antigen   Result Value Ref Range    Prostate Specific Antigen 6.718 (H) <3.100 ng/mL   Basic Metabolic Panel   Result Value Ref Range    Sodium 141 134 - 144 mmol/L    Potassium 4.0 3.5 - 5.1 mmol/L    Chloride 109 (H) 98 - 107 mmol/L    Carbon Dioxide 27 21 - 31 mmol/L    Anion Gap 5 3 - 14 mmol/L    Glucose 112 (H) 70 - 105 mg/dL    Urea Nitrogen 25 7 - 25 mg/dL    Creatinine 1.48 (H) 0.70 - 1.30 mg/dL    GFR Estimate 47 (L) >60 mL/min/[1.73_m2]    GFR Estimate If Black 57 (L) >60 mL/min/[1.73_m2]    Calcium 10.4 (H) 8.6 - 10.3 mg/dL   TSH   Result Value Ref Range    Thyrotropin 1.83 0.34 - 5.60 IU/mL           Sincerely,        Fady Penaloza MD

## 2019-01-23 NOTE — PATIENT INSTRUCTIONS
Preventive Health Recommendations:     See your health care provider every year to    Review health changes.     Discuss preventive care.      Review your medicines if your doctor has prescribed any.      Talk with your health care provider about whether you should have a test to screen for prostate cancer (PSA).    Every 3 years, have a diabetes test (fasting glucose). If you are at risk for diabetes, you should have this test more often.    Every 5 years, have a cholesterol test. Have this test more often if you are at risk for high cholesterol or heart disease.     Every 10 years, have a colonoscopy. Or, have a yearly FIT test (stool test). These exams will check for colon cancer.    Talk to with your health care provider about screening for Abdominal Aortic Aneurysm if you have a family history of AAA or have a history of smoking.    Shots:     Get a flu shot each year.     Get a tetanus shot every 10 years.     Talk to your doctor about your pneumonia vaccines. There are now two you should receive - Pneumovax (PPSV 23) and Prevnar (PCV 13).     Talk to your pharmacist about a shingles vaccine.     Talk to your doctor about the hepatitis B vaccine.  Nutrition:     Eat at least 5 servings of fruits and vegetables each day.     Eat whole-grain bread, whole-wheat pasta and brown rice instead of white grains and rice.     Get adequate Calcium and Vitamin D.   Lifestyle    Exercise for at least 150 minutes a week (30 minutes a day, 5 days a week). This will help you control your weight and prevent disease.     Limit alcohol to one drink per day.     No smoking.     Wear sunscreen to prevent skin cancer.    See your dentist every six months for an exam and cleaning.    See your eye doctor every 1 to 2 years to screen for conditions such as glaucoma, macular degeneration, cataracts, etc.    Personalized Prevention Plan  You are due for the preventive services outlined below.  Your care team is available to assist you  in scheduling these services.  If you have already completed any of these items, please share that information with your care team to update in your medical record.  Health Maintenance Due   Topic Date Due     Hepatitis C Screening  06/28/1966     Zoster (Chicken Pox) Vaccine (2 of 3) 03/12/2010     FALL RISK ASSESSMENT  06/28/2013     AORTIC ANEURYSM SCREENING (SYSTEM ASSIGNED)  06/28/2013

## 2019-01-23 NOTE — PROGRESS NOTES
SUBJECTIVE:   CC: David Samaniego is an 68 year old male who presents for preventive health visit.     Healthy Habits:    Do you get at least three servings of calcium containing foods daily (dairy, green leafy vegetables, etc.)? yes    Amount of exercise or daily activities, outside of work: 5 day(s) per week    Problems taking medications regularly No    Medication side effects: No    Have you had an eye exam in the past two years? yes    Do you see a dentist twice per year? yes    Do you have sleep apnea, excessive snoring or daytime drowsiness?no      Would like refills on his meds.    Today's PHQ-2 Score:   PHQ-2 ( 1999 [employer]) 1/23/2019 6/22/2018   Q1: Little interest or pleasure in doing things 0 0   Q2: Feeling down, depressed or hopeless 0 0   PHQ-2 Score 0 0       Abuse: Current or Past(Physical, Sexual or Emotional)- No  Do you feel safe in your environment? Yes    Social History     Tobacco Use     Smoking status: Never Smoker     Smokeless tobacco: Never Used   Substance Use Topics     Alcohol use: Yes     Alcohol/week: 0.0 oz     Comment: Alcoholic Drinks/day: occasionally     If you drink alcohol do you typically have >3 drinks per day or >7 drinks per week? No                      Last PSA: No results found for: PSA    Reviewed orders with patient. Reviewed health maintenance and updated orders accordingly - Yes  Labs reviewed in Psychiatric  Current Outpatient Medications   Medication Sig Dispense Refill     acyclovir (ZOVIRAX) 400 MG tablet Take 1 tablet by mouth daily       FLUZONE HIGH-DOSE 0.5 ML injection        hydrochlorothiazide (HYDRODIURIL) 25 MG tablet Take 1 tablet (25 mg) by mouth daily 90 tablet 3     levothyroxine (SYNTHROID/LEVOTHROID) 150 MCG tablet Take 1 tablet (150 mcg) by mouth daily (with breakfast) 90 tablet 3     omeprazole (PRILOSEC) 20 MG CR capsule Take 20 mg by mouth daily       No Known Allergies    Reviewed and updated as needed this visit by clinical staff  Tobacco   "Allergies  Meds  Med Hx  Surg Hx  Fam Hx  Soc Hx        Reviewed and updated as needed this visit by Provider        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     No Comments Provided     Enlarged prostate without lower urinary tract symptoms (luts)     No Comments Provided     Essential (primary) hypertension     No Comments Provided     Gastro-esophageal reflux disease without esophagitis     No Comments Provided     Hypothyroidism     No Comments Provided     Other specified congenital malformations of intestine     No Comments Provided      Past Surgical History:   Procedure Laterality Date     BACK SURGERY      1986 and 1987     COLONOSCOPY      7/29/05,next due 2015     COLONOSCOPY      10/26/15,F/U 2020     OTHER SURGICAL HISTORY      [id number],PARTIAL THYROIDECTOMY     OTHER SURGICAL HISTORY      812383,ANESTHESIA ALERT,Notes no prior complications from anesthesia.:       ROS:  CONSTITUTIONAL: NEGATIVE for fever, chills, change in weight  INTEGUMENTARY/SKIN: NEGATIVE for worrisome rashes, moles or lesions  EYES: NEGATIVE for vision changes or irritation  ENT: NEGATIVE for ear, mouth and throat problems  RESP: NEGATIVE for significant cough or SOB  CV: NEGATIVE for chest pain, palpitations or peripheral edema  GI: NEGATIVE for nausea, abdominal pain, heartburn, or change in bowel habits   male: negative for dysuria, hematuria, decreased urinary stream, erectile dysfunction, urethral discharge  MUSCULOSKELETAL: NEGATIVE for significant arthralgias or myalgia  NEURO: NEGATIVE for weakness, dizziness or paresthesias  PSYCHIATRIC: NEGATIVE for changes in mood or affect    OBJECTIVE:   /80 (BP Location: Right arm, Patient Position: Sitting, Cuff Size: Adult Large)   Pulse 60   Resp 16   Ht 1.956 m (6' 5\")   Wt 103.4 kg (228 lb)   BMI 27.04 kg/m    EXAM:  GENERAL: " healthy, alert and no distress  EYES: Eyes grossly normal to inspection, PERRL and conjunctivae and sclerae normal  HENT: ear canals and TM's normal, nose and mouth without ulcers or lesions  NECK: no adenopathy, no asymmetry, masses, or scars and thyroid normal to palpation  RESP: lungs clear to auscultation - no rales, rhonchi or wheezes  CV: regular rate and rhythm, normal S1 S2, no S3 or S4, no murmur, click or rub, no peripheral edema and peripheral pulses strong  ABDOMEN: soft, nontender, no hepatosplenomegaly, no masses and bowel sounds normal  MS: no gross musculoskeletal defects noted, no edema  SKIN: no suspicious lesions or rashes  NEURO: Normal strength and tone, mentation intact and speech normal  PSYCH: mentation appears normal, affect normal/bright    Diagnostic Test Results:  Results for orders placed or performed in visit on 01/23/19   Prostate Specific Antigen   Result Value Ref Range    Prostate Specific Antigen 6.718 (H) <3.100 ng/mL   Basic Metabolic Panel   Result Value Ref Range    Sodium 141 134 - 144 mmol/L    Potassium 4.0 3.5 - 5.1 mmol/L    Chloride 109 (H) 98 - 107 mmol/L    Carbon Dioxide 27 21 - 31 mmol/L    Anion Gap 5 3 - 14 mmol/L    Glucose 112 (H) 70 - 105 mg/dL    Urea Nitrogen 25 7 - 25 mg/dL    Creatinine 1.48 (H) 0.70 - 1.30 mg/dL    GFR Estimate 47 (L) >60 mL/min/[1.73_m2]    GFR Estimate If Black 57 (L) >60 mL/min/[1.73_m2]    Calcium 10.4 (H) 8.6 - 10.3 mg/dL   TSH   Result Value Ref Range    Thyrotropin 1.83 0.34 - 5.60 IU/mL       ASSESSMENT/PLAN:   David was seen today for physical.    Diagnoses and all orders for this visit:    Routine general medical examination at a health care facility    Essential hypertension  -     Discontinue: hydrochlorothiazide (HYDRODIURIL) 25 MG tablet; Take 1 tablet (25 mg) by mouth daily  -     Basic Metabolic Panel; Future  -     Basic Metabolic Panel  -     hydrochlorothiazide (HYDRODIURIL) 25 MG tablet; Take 1 tablet (25 mg) by mouth  "daily    Hypothyroidism, unspecified type  -     Discontinue: levothyroxine (SYNTHROID/LEVOTHROID) 150 MCG tablet; Take 1 tablet (150 mcg) by mouth daily (with breakfast)  -     TSH; Future  -     TSH  -     levothyroxine (SYNTHROID/LEVOTHROID) 150 MCG tablet; Take 1 tablet (150 mcg) by mouth daily (with breakfast)    Elevated prostate specific antigen (PSA)  -     Prostate Specific Antigen; Future  -     Prostate Specific Antigen    Gastroesophageal reflux disease without esophagitis  -     Discontinue: omeprazole (PRILOSEC) 20 MG DR capsule; Take 1 capsule (20 mg) by mouth daily  -     omeprazole (PRILOSEC) 20 MG DR capsule; Take 1 capsule (20 mg) by mouth daily    Disturbance of skin sensation  -     Discontinue: acyclovir (ZOVIRAX) 400 MG tablet; Take 1 tablet (400 mg) by mouth daily  -     acyclovir (ZOVIRAX) 400 MG tablet; Take 1 tablet (400 mg) by mouth daily      PSA is mildly elevated.  Follow up pending with urology.  Discussed with him significant limitations in diagnosis of cancer with only mild elevations.    COUNSELING:  Reviewed preventive health counseling, as reflected in patient instructions       Regular exercise       Healthy diet/nutrition    BP Readings from Last 1 Encounters:   01/23/19 132/80     Estimated body mass index is 27.04 kg/m  as calculated from the following:    Height as of this encounter: 1.956 m (6' 5\").    Weight as of this encounter: 103.4 kg (228 lb).    BP Screening:   Last 3 BP Readings:    BP Readings from Last 3 Encounters:   01/23/19 132/80   06/22/18 138/88   01/25/18 136/76       The following was recommended to the patient:          reports that  has never smoked. he has never used smokeless tobacco.      Counseling Resources:  ATP IV Guidelines  Pooled Cohorts Equation Calculator  FRAX Risk Assessment  ICSI Preventive Guidelines  Dietary Guidelines for Americans, 2010  USDA's MyPlate  ASA Prophylaxis  Lung CA Screening    Fady Penaloza MD  [facility] AND " [facility]

## 2019-01-24 ASSESSMENT — ANXIETY QUESTIONNAIRES: GAD7 TOTAL SCORE: 0

## 2019-03-08 ENCOUNTER — DOCUMENTATION ONLY (OUTPATIENT)
Dept: OTHER | Facility: CLINIC | Age: 69
End: 2019-03-08

## 2019-06-07 ENCOUNTER — HOSPITAL ENCOUNTER (EMERGENCY)
Facility: OTHER | Age: 69
Discharge: HOME | End: 2019-06-07
Attending: EMERGENCY MEDICINE | Admitting: EMERGENCY MEDICINE
Payer: COMMERCIAL

## 2019-06-07 ENCOUNTER — TELEPHONE (OUTPATIENT)
Dept: UROLOGY | Facility: OTHER | Age: 69
End: 2019-06-07

## 2019-06-07 VITALS
WEIGHT: 220 LBS | DIASTOLIC BLOOD PRESSURE: 84 MMHG | SYSTOLIC BLOOD PRESSURE: 137 MMHG | TEMPERATURE: 96.6 F | HEART RATE: 72 BPM | RESPIRATION RATE: 24 BRPM | BODY MASS INDEX: 26.79 KG/M2 | HEIGHT: 76 IN | OXYGEN SATURATION: 98 %

## 2019-06-07 DIAGNOSIS — R33.9 URINARY RETENTION: ICD-10-CM

## 2019-06-07 PROCEDURE — 51702 INSERT TEMP BLADDER CATH: CPT | Performed by: EMERGENCY MEDICINE

## 2019-06-07 PROCEDURE — 99283 EMERGENCY DEPT VISIT LOW MDM: CPT | Mod: 25 | Performed by: EMERGENCY MEDICINE

## 2019-06-07 PROCEDURE — 99283 EMERGENCY DEPT VISIT LOW MDM: CPT | Mod: Z6 | Performed by: EMERGENCY MEDICINE

## 2019-06-07 RX ORDER — TAMSULOSIN HYDROCHLORIDE 0.4 MG/1
0.4 CAPSULE ORAL DAILY
Qty: 10 CAPSULE | Refills: 0 | Status: SHIPPED | OUTPATIENT
Start: 2019-06-07 | End: 2019-08-13

## 2019-06-07 ASSESSMENT — MIFFLIN-ST. JEOR: SCORE: 1859.41

## 2019-06-07 NOTE — ED TRIAGE NOTES
Pt presents to ED with c/o constipation and anuria. Pt is very distressed. Pt started to feel sick last night and then worse today. Pt was recently away on vacation [place] the states.    Yeni Lynn RN on 6/7/2019 at 12:07 PM

## 2019-06-07 NOTE — ED PROVIDER NOTES
"David Samaniego  : 1950 Age: 68 year old Sex: male MRN: [id number]    CC: Abdominal pain, urinary retention    HPI: David Samaniego is a 68 year old male with PMH significant for BPH who presents with inability to urinate and significant abdominal pain.  He states that since last night he is been unable to urinate at all, and has had progressively worsening abdominal pain and distention associated with this.  Denies any fevers.  Denies any recent illness or injury    ED Course and MDM:  Urinary retention: A Garcia was placed on patient's arrival into the emergency department and more than a liter and a half of urine was drained.  The patient's symptoms immediately improved and continued to be improved following this.  Most likely his symptoms are result of urinary retention secondary to BPH.  He was started on Flomax and plan for follow-up with urology for trial of void    Final Clinical Impression:  Urinary retention    Delfino Chaidez MD  Internal Medicine and Emergency Medicine  1:02 PM 19      Physical Exam:  BP (!) 176/92   Pulse 55   Temp 96.6  F (35.9  C) (Tympanic)   Resp 24   Ht 1.93 m (6' 4\")   Wt 99.8 kg (220 lb)   SpO2 96%   BMI 26.78 kg/m      Gen: Awake, alert, appears uncomfortable  HEENT: Moist mucous membranes, extraocular movements intact  CV: Tachycardic, warm and well-perfused  Pulm: Nonlabored, equal chest rise  Abd: Soft, distended and lower abdomen and exquisitely tender  Ext: Full range of motion, no edema  Neuro: Oriented x3, no focal deficit  Psych: Appropriate affect, cognition intact    ROS:  10 point review of systems performed and negative except as noted in HPI.    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     No Comments Provided     Enlarged prostate without lower urinary tract symptoms (luts)     No " Comments Provided     Essential (primary) hypertension     No Comments Provided     Gastro-esophageal reflux disease without esophagitis     No Comments Provided     Hypothyroidism     No Comments Provided     Other specified congenital malformations of intestine     No Comments Provided         Family history:  Family History   Problem Relation Age of Onset     Breast Cancer Mother         Cancer-breast,and progressed to brain cancer     Other - See Comments Mother         Stroke     Other - See Comments Father         Parkinson /dementia     Heart Disease No family hx of         Heart Disease     Diabetes No family hx of         Diabetes         Social History:   Social History     Socioeconomic History     Marital status:      Spouse name: Not on file     Number of children: Not on file     Years of education: Not on file     Highest education level: Not on file   Occupational History     Not on file   Social Needs     Financial resource strain: Not on file     Food insecurity:     Worry: Not on file     Inability: Not on file     Transportation needs:     Medical: Not on file     Non-medical: Not on file   Tobacco Use     Smoking status: Never Smoker     Smokeless tobacco: Never Used   Substance and Sexual Activity     Alcohol use: Yes     Alcohol/week: 0.0 oz     Comment: Alcoholic Drinks/day: occasionally     Drug use: Never     Types: Other     Comment: Drug use: No     Sexual activity: Not on file   Lifestyle     Physical activity:     Days per week: Not on file     Minutes per session: Not on file     Stress: Not on file   Relationships     Social connections:     Talks on phone: Not on file     Gets together: Not on file     Attends [employer] service: Not on file     Active member of club or organization: Not on file     Attends meetings of clubs or organizations: Not on file     Relationship status: Not on file     Intimate partner violence:     Fear of current or ex partner: Not on file      Emotionally abused: Not on file     Physically abused: Not on file     Forced sexual activity: Not on file   Other Topics Concern     Not on file   Social History Narrative    Alcohol Use - yes        Works out 3 days per week.  Retired.    Preload 03/08/13         Surgical History:  Past Surgical History:   Procedure Laterality Date     BACK SURGERY      1986 and 1987     COLONOSCOPY      7/29/05,next due 2015     COLONOSCOPY      10/26/15,F/U 2020     OTHER SURGICAL HISTORY      [id number],PARTIAL THYROIDECTOMY     OTHER SURGICAL HISTORY      [id number],ANESTHESIA ALERT,Notes no prior complications from anesthesia.:                  Delfino Chaidez MD  06/07/19 [id number]

## 2019-06-07 NOTE — ED AVS SNAPSHOT
[facility]  [address]  [place] [address]  Phone:  [phone]  Fax:  [phone]                                    David Samaniego   MRN: [id number]    Department:  [facility] and [facility]   Date of Visit:  6/7/2019           After Visit Summary Signature Page    I have received my discharge instructions, and my questions have been answered. I have discussed any challenges I see with this plan with the nurse or doctor.    ..........................................................................................................................................  Patient/Patient Representative Signature      ..........................................................................................................................................  Patient Representative Print Name and Relationship to Patient    ..................................................               ................................................  Date                                   Time    ..........................................................................................................................................  Reviewed by Signature/Title    ...................................................              ..............................................  Date                                               Time          22EPIC Rev 08/18

## 2019-06-10 NOTE — TELEPHONE ENCOUNTER
Concetta please call the patient back as we are unable to do a work in for a void trial today.  Le Scott RN......Nirmala 10, 2019...7:28 AM

## 2019-06-11 ENCOUNTER — MYC MEDICAL ADVICE (OUTPATIENT)
Dept: UROLOGY | Facility: OTHER | Age: 69
End: 2019-06-11

## 2019-06-19 ENCOUNTER — OFFICE VISIT (OUTPATIENT)
Dept: UROLOGY | Facility: OTHER | Age: 69
End: 2019-06-19
Attending: UROLOGY
Payer: COMMERCIAL

## 2019-06-19 VITALS
SYSTOLIC BLOOD PRESSURE: 128 MMHG | WEIGHT: 224.6 LBS | BODY MASS INDEX: 27.34 KG/M2 | DIASTOLIC BLOOD PRESSURE: 82 MMHG | TEMPERATURE: 97.9 F | HEART RATE: 80 BPM | RESPIRATION RATE: 12 BRPM

## 2019-06-19 DIAGNOSIS — R50.9 FEVER AND CHILLS: Primary | ICD-10-CM

## 2019-06-19 DIAGNOSIS — R33.8 URINARY RETENTION DUE TO BENIGN PROSTATIC HYPERPLASIA: ICD-10-CM

## 2019-06-19 DIAGNOSIS — N40.1 URINARY RETENTION DUE TO BENIGN PROSTATIC HYPERPLASIA: ICD-10-CM

## 2019-06-19 PROCEDURE — 87086 URINE CULTURE/COLONY COUNT: CPT | Mod: ZL | Performed by: UROLOGY

## 2019-06-19 PROCEDURE — 99214 OFFICE O/P EST MOD 30 MIN: CPT | Performed by: UROLOGY

## 2019-06-19 PROCEDURE — G0463 HOSPITAL OUTPT CLINIC VISIT: HCPCS

## 2019-06-19 PROCEDURE — G0463 HOSPITAL OUTPT CLINIC VISIT: HCPCS | Mod: 25

## 2019-06-19 PROCEDURE — 87088 URINE BACTERIA CULTURE: CPT | Mod: ZL | Performed by: UROLOGY

## 2019-06-19 RX ORDER — SULFAMETHOXAZOLE/TRIMETHOPRIM 800-160 MG
1 TABLET ORAL 2 TIMES DAILY
Qty: 14 TABLET | Refills: 0 | Status: SHIPPED | OUTPATIENT
Start: 2019-06-19 | End: 2019-07-26

## 2019-06-19 ASSESSMENT — PAIN SCALES - GENERAL: PAINLEVEL: NO PAIN (0)

## 2019-06-19 NOTE — PROGRESS NOTES
Type of Visit  EST    Chief Complaint  Urinary retention  Fever of unknown origin    HPI  Mr Samaniego is a 68 year old male who presents with urinary retention as well as a recent fever.  He presented to the ED with symptoms of pelvic pain and a catheter was placed.  Catheter was placed 12 days ago.  He has not had catheters placed in the past.  He has not previously had prostate surgery.    He is currently on the following medications for urinary complaints: Flomax.    Recurrent bladder infections  No  Incontinence    No  Constipation    No     He does report fever of 102 last night.  He did check it with a thermometer.  This was the first fever he experienced in the last 2 weeks.  No fever this morning.      Family History  Family History   Problem Relation Age of Onset     Breast Cancer Mother         Cancer-breast,and progressed to brain cancer     Other - See Comments Mother         Stroke     Other - See Comments Father         Parkinson /dementia     Heart Disease No family hx of         Heart Disease     Diabetes No family hx of         Diabetes       Review of Systems  I personally reviewed the ROS with the patient.    Nursing Notes:   Le Scott RN  6/19/2019  8:42 AM  Signed  Review of Systems:    Weight loss:    Yes     Recent fever/chills:  Yes   Night sweats:   Yes  Current skin rash:  No   Recent hair loss:  No  Heat intolerance:  No   Cold intolerance:  No  Chest pain:   No   Palpitations:   No  Shortness of breath:  No   Wheezing:   No  Constipation:    No   Diarrhea:   No   Nausea:   No   Vomiting:   No   Kidney/side pain:  No   Back pain:   No  Frequent headaches:  Yes   Dizziness:     Yes  Leg swelling:   No   Calf pain:    No    Physical Exam  Vitals:    06/19/19 0834   BP: 128/82   BP Location: Right arm   Patient Position: Sitting   Cuff Size: Adult Regular   Pulse: 80   Resp: 12   Temp: 97.9  F (36.6  C)   TempSrc: Temporal   Weight: 101.9 kg (224 lb 9.6 oz)     Constitutional: No acute  distress.  Alert and cooperative   Head: NCAT  Eyes: Conjunctivae normal  Cardiovascular: Regular rate.  Pulmonary/Chest: Respirations are even and non-labored bilaterally, no audible wheezing  Abdominal: Soft. No distension, tenderness, masses or guarding.   Neurological: A + O x 3.  Cranial Nerves II-XII grossly intact.  Extremities: JUANI x 4, Warm. No clubbing.  No cyanosis.    Skin: Pink, warm and dry.  No visible rashes noted.  Psychiatric:  Normal mood and affect  Back:  No left CVA tenderness.  No right CVA tenderness.  Genitourinary:  Catheter in place draining clear urine    Labs  Results for JAZMINE GOMEZ (MRN [id number]) as of 6/19/2019 09:25   1/23/2019 12:21   Sodium 141   Potassium 4.0   Chloride 109 (H)   Carbon Dioxide 27   Urea Nitrogen 25   Creatinine 1.48 (H)   GFR Estimate 47 (L)   GFR Estimate If Black 57 (L)   Calcium 10.4 (H)   Anion Gap 5   Prostate Specific Antigen 6.718 (H)   Thyrotropin 1.83   Glucose 112 (H)       Urine culture  None    Assessment & Plan  Mr. Gomez is a 68 year old male who presents with urinary retention with documented fevers last night.  I explained that the most likely source for the fevers is urinary however he may potentially have a different source.  He has no other symptoms to suggest GI, respiratory, sinus, etc.  I recommended we collect his urine today and postpone the void trial.  I also recommended an empiric week of Bactrim.  Continue Flomax  He will follow-up Monday for a void trial.

## 2019-06-19 NOTE — NURSING NOTE
Review of Systems:    Weight loss:    Yes     Recent fever/chills:  Yes   Night sweats:   Yes  Current skin rash:  No   Recent hair loss:  No  Heat intolerance:  No   Cold intolerance:  No  Chest pain:   No   Palpitations:   No  Shortness of breath:  No   Wheezing:   No  Constipation:    No   Diarrhea:   No   Nausea:   No   Vomiting:   No   Kidney/side pain:  No   Back pain:   No  Frequent headaches:  Yes   Dizziness:     Yes  Leg swelling:   No   Calf pain:    No

## 2019-06-21 LAB
BACTERIA SPEC CULT: ABNORMAL
SPECIMEN SOURCE: ABNORMAL

## 2019-06-24 ENCOUNTER — OFFICE VISIT (OUTPATIENT)
Dept: UROLOGY | Facility: OTHER | Age: 69
End: 2019-06-24
Attending: UROLOGY
Payer: COMMERCIAL

## 2019-06-24 VITALS
RESPIRATION RATE: 16 BRPM | HEART RATE: 88 BPM | DIASTOLIC BLOOD PRESSURE: 88 MMHG | WEIGHT: 225.8 LBS | SYSTOLIC BLOOD PRESSURE: 160 MMHG | BODY MASS INDEX: 27.49 KG/M2

## 2019-06-24 DIAGNOSIS — N30.00 ACUTE CYSTITIS WITHOUT HEMATURIA: ICD-10-CM

## 2019-06-24 DIAGNOSIS — N40.1 URINARY RETENTION DUE TO BENIGN PROSTATIC HYPERPLASIA: Primary | ICD-10-CM

## 2019-06-24 DIAGNOSIS — R33.8 URINARY RETENTION DUE TO BENIGN PROSTATIC HYPERPLASIA: Primary | ICD-10-CM

## 2019-06-24 PROCEDURE — 51798 US URINE CAPACITY MEASURE: CPT | Performed by: UROLOGY

## 2019-06-24 PROCEDURE — 51701 INSERT BLADDER CATHETER: CPT | Performed by: UROLOGY

## 2019-06-24 PROCEDURE — 99214 OFFICE O/P EST MOD 30 MIN: CPT | Mod: 25 | Performed by: UROLOGY

## 2019-06-24 PROCEDURE — G0463 HOSPITAL OUTPT CLINIC VISIT: HCPCS | Mod: 25

## 2019-06-24 PROCEDURE — 51700 IRRIGATION OF BLADDER: CPT | Performed by: UROLOGY

## 2019-06-24 ASSESSMENT — PAIN SCALES - GENERAL: PAINLEVEL: NO PAIN (0)

## 2019-06-24 NOTE — NURSING NOTE
Pt presents to clinic for void trial    Review of Systems:    Weight loss:    No     Recent fever/chills:  No   Night sweats:   No  Current skin rash:  No   Recent hair loss:  No  Heat intolerance:  No   Cold intolerance:  No  Chest pain:   No   Palpitations:   No  Shortness of breath:  No   Wheezing:   No  Constipation:    No   Diarrhea:   No   Nausea:   No   Vomiting:   No   Kidney/side pain:  No   Back pain:   No  Frequent headaches:  No   Dizziness:     No  Leg swelling:   No   Calf pain:    No    Void Trial  Verbal order read back by Art Liu MD to perform void trial and post void residual bladder scan.  Patient's bladder was filled under gravity with 225mL of sterile saline.  Patient voided 75mL.  Post-void residual was measured by ultrasonic bladder scanner: 476 mL  Patient instructed to come back at 1pm.  Needs to drink water and urinate several times on his own.

## 2019-06-24 NOTE — PROGRESS NOTES
Type of Visit  EST    Chief Complaint  Urinary retention  UTI    HPI  Mr Samaniego is a 68 year old male who follows up with urinary retention.  Patient underwent catheter placement almost 3 weeks ago.  The patient was experiencing intermittent fevers throughout the first 2 weeks.  Urine culture was never sent and he was not started on antibiotics.  When I saw the patient last week I recommended we cancel the planned void trial and start him on a week of Bactrim.  Also sent a urine culture that was positive.  Today he reports feeling much better and the fevers have resolved.  He has not had catheters placed in the past.  He has not previously had prostate surgery.  Patient continues Flomax without side effects.      Family History  Family History   Problem Relation Age of Onset     Breast Cancer Mother         Cancer-breast,and progressed to brain cancer     Other - See Comments Mother         Stroke     Other - See Comments Father         Parkinson /dementia     Heart Disease No family hx of         Heart Disease     Diabetes No family hx of         Diabetes       Review of Systems  I personally reviewed the ROS with the patient.    Nursing Notes:   Natalie Villavicencio LPN  6/24/2019 10:17 AM  Signed  Pt presents to clinic for void trial    Review of Systems:    Weight loss:    No     Recent fever/chills:  No   Night sweats:   No  Current skin rash:  No   Recent hair loss:  No  Heat intolerance:  No   Cold intolerance:  No  Chest pain:   No   Palpitations:   No  Shortness of breath:  No   Wheezing:   No  Constipation:    No   Diarrhea:   No   Nausea:   No   Vomiting:   No   Kidney/side pain:  No   Back pain:   No  Frequent headaches:  No   Dizziness:     No  Leg swelling:   No   Calf pain:    No    Void Trial  Verbal order read back by Art Liu MD to perform void trial and post void residual bladder scan.  Patient's bladder was filled under gravity with 225mL of sterile saline.  Patient voided 75mL.  Post-void  residual was measured by ultrasonic bladder scanner: 476 mL  Patient instructed to come back at 1pm.  Needs to drink water and urinate several times on his own.        Nilam Mayfield, LPN  6/24/2019 11:35 AM  Signed  Chief Complaint   Patient presents with     Procedure     Void trail   Post-Void Residual  A post-void residual was measured by ultrasonic bladder scanner.  699 mL  Nilam AtaralphALEJANDRO  6/24/2019 11:34 AM    Medication Reconciliation: completed   Nilam Ataralph, LPN  6/24/2019 11:34 AM       Physical Exam  Vitals:    06/24/19 0847   BP: 160/88   BP Location: Right arm   Patient Position: Sitting   Cuff Size: Adult Regular   Pulse: 88   Resp: 16   Weight: 102.4 kg (225 lb 12.8 oz)     Constitutional: No acute distress.  Alert and cooperative   Head: NCAT  Eyes: Conjunctivae normal  Cardiovascular: Regular rate.  Pulmonary/Chest: Respirations are even and non-labored bilaterally, no audible wheezing  Abdominal: Soft. No distension, tenderness, masses or guarding.   Extremities: JUANI x 4, Warm. No clubbing.  No cyanosis.    Skin: Pink, warm and dry.  No visible rashes noted.  Back:  No left CVA tenderness.  No right CVA tenderness.  Genitourinary:  Catheter in place draining clear urine    Labs  Results for JAZMINE GOMEZ (MRN [id number]) as of 6/19/2019 09:25   1/23/2019 12:21   Sodium 141   Potassium 4.0   Chloride 109 (H)   Carbon Dioxide 27   Urea Nitrogen 25   Creatinine 1.48 (H)   GFR Estimate 47 (L)   GFR Estimate If Black 57 (L)   Calcium 10.4 (H)   Anion Gap 5   Prostate Specific Antigen 6.718 (H)   Thyrotropin 1.83   Glucose 112 (H)       Urine culture  6/19/2019  Culture Micro Abnormal  06/19/2019 10:06 AM [facility]   >100,000 colonies/mL   Citrobacter freundii   Some antibiotics have been suppressed due to the known inducible beta lactamase activity.   Please contact Microbiology for more information.    Susceptibility     Citrobacter freundii     Antibiotic Interpretation Sensitivity Method Status    AMPICILLIN Resistant >16 ug/mL KERWIN Final   CEFEPIME Sensitive <=8 ug/mL KERWIN Final   CIPROFLOXACIN Sensitive <=1 ug/mL KERWIN Final   GENTAMICIN Sensitive <=4 ug/mL KERWIN Final   IMIPENEM Sensitive <=1 ug/mL KERWIN Final   LEVOFLOXACIN Sensitive <=2 ug/mL KERWIN Final   NITROFURANTOIN Sensitive <=32 ug/mL KERWIN Final   TETRACYCLINE Sensitive <=4 ug/mL KERWIN Final   Trimethoprim/Sulfa Sensitive <=2/38 ug/mL KERWIN Final     Procedure  16 French straight catheter was passed into the patient's bladder with drainage of 700 mL of clear urine during the process of self-catheterization teaching.    Assessment & Plan  Mr. Samaniego is a 68 year old male who follows up with urinary retention which developed during a UTI.  Urine culture revealed sensitivity to Bactrim which the patient is completing.  The patient failed a void trial today but was willing to attempt self-catheterization.  Perform intermittent catheterization 3-5 times a day as needed based on pelvic fullness.  Continue Flomax  Follow-up next week with void/self cath log with plans for cystoscopy and possible discussion of TURP.

## 2019-06-24 NOTE — NURSING NOTE
Chief Complaint   Patient presents with     Procedure     Void trail   Post-Void Residual  A post-void residual was measured by ultrasonic bladder scanner.  699 mL  Nilam Mayfield LPN  6/24/2019 11:34 AM    Medication Reconciliation: completed   Nilam Mayfield LPN  6/24/2019 11:34 AM

## 2019-07-01 ENCOUNTER — OFFICE VISIT (OUTPATIENT)
Dept: UROLOGY | Facility: OTHER | Age: 69
End: 2019-07-01
Attending: UROLOGY
Payer: COMMERCIAL

## 2019-07-01 VITALS — HEART RATE: 68 BPM | WEIGHT: 225.4 LBS | BODY MASS INDEX: 26.61 KG/M2 | RESPIRATION RATE: 12 BRPM | HEIGHT: 77 IN

## 2019-07-01 DIAGNOSIS — R33.8 URINARY RETENTION DUE TO BENIGN PROSTATIC HYPERPLASIA: Primary | ICD-10-CM

## 2019-07-01 DIAGNOSIS — N40.1 URINARY RETENTION DUE TO BENIGN PROSTATIC HYPERPLASIA: Primary | ICD-10-CM

## 2019-07-01 PROCEDURE — G0463 HOSPITAL OUTPT CLINIC VISIT: HCPCS | Mod: 25

## 2019-07-01 PROCEDURE — 99214 OFFICE O/P EST MOD 30 MIN: CPT | Mod: 25 | Performed by: UROLOGY

## 2019-07-01 PROCEDURE — 52000 CYSTOURETHROSCOPY: CPT | Performed by: UROLOGY

## 2019-07-01 ASSESSMENT — PAIN SCALES - GENERAL: PAINLEVEL: NO PAIN (0)

## 2019-07-01 ASSESSMENT — MIFFLIN-ST. JEOR: SCORE: 1894.79

## 2019-07-01 NOTE — NURSING NOTE
Patient positioned in supine position, perineum area prepped with chlorhexidene Gluconate and patient draped per sterile technique. Per verbal order read back by Art Liu MD, Urojet 10mL 2% lidocaine jelly to be instilled into urethra.  Urojet- 10ml 2% Lidocaine jelly instilled into the urethra.    Urojet 2%  Lot#: [id number]  Expiration date: 2/21  : [employer]  NDC: [address]-0    [place] Protocol    A. Pre-procedure verification complete Yes  1-relevant information / documentation available, reviewed and properly matched to the patient; 2-consent accurate and complete, 3-equipment and supplies available    B. Site marking complete N/A  Site marked if not in continuous attendance with patient    C. TIME OUT completed Yes  Time Out was conducted just prior to starting procedure to verify the eight required elements: 1-patient identity, 2-consent accurate and complete, 3-position, 4-correct side/site marked (if applicable), 5-procedure, 6-relevant images / results properly labeled and displayed (if applicable), 7-antibiotics / irrigation fluids (if applicable), 8-safety precautions.    After procedure perineum area rinsed. Discharge instructions reviewed with patient. Patient verbalized understanding of discharge instructions and discharged ambulatory.  Le Scott..................7/1/2019  9:09 AM

## 2019-07-01 NOTE — PATIENT INSTRUCTIONS

## 2019-07-01 NOTE — PROGRESS NOTES
Type of Visit  EST    Chief Complaint  Urinary retention    HPI  Mr Samaniego is a 68 year old male who follows up with urinary retention.  He underwent upper catheter placement over a month ago due to retention.  He was diagnosed with a UTI at the same time and he ultimately did take a full course of culture specific antibiotics.  He subsequently failed void trials and ultimately started self catheterizing.  He has been self catheterizing twice daily for the last week.  He presents today with a self cath log - volitional voiding: catheterized ratio of 1:2.  Residuals have ranged between 300-600 mL.  He presents today also to undergo cystoscopy for work-up for retention.    He denies dysuria, fevers and chills.  Patient has not previously undergone prostate procedures or surgeries other than a prostate biopsy which was negative.  He also continues taking Flomax daily without side effects.      Family History  Family History   Problem Relation Age of Onset     Breast Cancer Mother         Cancer-breast,and progressed to brain cancer     Other - See Comments Mother         Stroke     Other - See Comments Father         Parkinson /dementia     Heart Disease No family hx of         Heart Disease     Diabetes No family hx of         Diabetes       Review of Systems  I personally reviewed the ROS with the patient.    Weight loss:    No     Recent fever/chills:  No   Night sweats:   No  Current skin rash:  No   Recent hair loss:  No  Heat intolerance:  No   Cold intolerance:  No  Chest pain:   No   Palpitations:   No  Shortness of breath:  No   Wheezing:   No  Constipation:    No   Diarrhea:   No   Nausea:   No   Vomiting:   No   Kidney/side pain:  No   Back pain:   No  Frequent headaches:  No   Dizziness:     No  Leg swelling:   No   Calf pain:    No    Nursing Notes:   Le Scott RN  7/1/2019  9:10 AM  Signed  Patient positioned in supine position, perineum area prepped with chlorhexidene Gluconate and patient draped  "per sterile technique. Per verbal order read back by Art Liu MD, Urojet 10mL 2% lidocaine jelly to be instilled into urethra.  Urojet- 10ml 2% Lidocaine jelly instilled into the urethra.    Urojet 2%  Lot#: [id number]  Expiration date: 2/21  : [employer]  NDC: [address]-7    [place] Protocol    A. Pre-procedure verification complete Yes  1-relevant information / documentation available, reviewed and properly matched to the patient; 2-consent accurate and complete, 3-equipment and supplies available    B. Site marking complete N/A  Site marked if not in continuous attendance with patient    C. TIME OUT completed Yes  Time Out was conducted just prior to starting procedure to verify the eight required elements: 1-patient identity, 2-consent accurate and complete, 3-position, 4-correct side/site marked (if applicable), 5-procedure, 6-relevant images / results properly labeled and displayed (if applicable), 7-antibiotics / irrigation fluids (if applicable), 8-safety precautions.    After procedure perineum area rinsed. Discharge instructions reviewed with patient. Patient verbalized understanding of discharge instructions and discharged ambulatory.  Le Scott..................7/1/2019  9:09 AM      Physical Exam  Vitals:    07/01/19 0857   Pulse: 68   Resp: 12   Weight: 102.2 kg (225 lb 6.4 oz)   Height: 1.956 m (6' 5\")     Constitutional: No acute distress.  Alert and cooperative   Head: NCAT  Eyes: Conjunctivae normal  Cardiovascular: Regular rate.  Pulmonary/Chest: Respirations are even and non-labored bilaterally, no audible wheezing  Abdominal: Soft. No distension, tenderness, masses or guarding.   Extremities: JUANI x 4, Warm. No clubbing.  No cyanosis.    Skin: Pink, warm and dry.  No visible rashes noted.  Back:  No left CVA tenderness.  No right CVA tenderness.  Genitourinary:  Catheter in place draining clear " urine    ^^^^^^^^^^^^^^^^^^^^^^^^^^^^^^^^^^^^^^^^^^^^^^^^^^^^^^^^^^^^^^^    Preprocedure diagnosis  BPH with retention    Postprocedure diagnosis  BPH with retention and obstruction    Procedure  Flexible Cystourethroscopy    Surgeon  Art Liu MD    Anesthesia  2% lidocaine jelly intraurethrally    Complications  None    Indications  69 year old male undergoing a flexible cystoscopy for the above mentioned indications.    Findings  Cystoscopic findings included a normal anterior urethra.    There was a prominent median lobe.    The lateral lobes were obstructive in appearance.  The bladder appeared to be normal capacity.    There were no tumors, stones or foreign bodies.    The orifices were slit-shaped and in their normal location.    Procedure  The patient was placed in supine position and prepped and draped in sterile fashion with lidocaine jelly per urethra for anesthesia.    I passed a lubricated 14F flexible cystoscope through the penile urethra and into the bladder and the bladder was completely visualized.  The cystoscope was retroflexed and the bladder neck and prostate visualized.    The cystoscope was slowly withdrawn while visualizing the urethra and the procedure terminated.    The patient tolerated the procedure well.      ^^^^^^^^^^^^^^^^^^^^^^^^^^^^^^^^^^^^^^^^^^^^^^^^^^^^^^^^^^^^^^^    Labs  Results for JAZMINE GOMEZ (MRN [id number]) as of 6/19/2019 09:25   1/23/2019 12:21   Sodium 141   Potassium 4.0   Chloride 109 (H)   Carbon Dioxide 27   Urea Nitrogen 25   Creatinine 1.48 (H)   GFR Estimate 47 (L)   GFR Estimate If Black 57 (L)   Calcium 10.4 (H)   Anion Gap 5   Prostate Specific Antigen 6.718 (H)   Thyrotropin 1.83   Glucose 112 (H)       Results for JAZMINE GOMEZ (MRN [id number]) as of 7/1/2019 09:34   1/23/2019 12:21   Prostate Specific Antigen 6.718 (H)     Results for ALAN GOMEZ (MRN [id number]) as of 1/25/2018 09:36   7/29/2014 08:40 1/13/2015 11:18 1/13/2016 10:00 1/3/2017 09:14  1/24/2018 08:58   PSA TOTAL (DIAGNOSTIC) 5.020 (H) 4.440 (H) 5.843 (H) 4.527 (H) 5.505 (H)     3/21/2014  6.71 ng/mL  (6.75 in 2012 prior to prostate biopsy)    Urine culture  6/19/2019  Culture Micro Abnormal  06/19/2019 10:06 AM [facility]   >100,000 colonies/mL   Citrobacter freundii   Some antibiotics have been suppressed due to the known inducible beta lactamase activity.   Please contact Microbiology for more information.    Susceptibility     Citrobacter freundii     Antibiotic Interpretation Sensitivity Method Status   AMPICILLIN Resistant >16 ug/mL KERWIN Final   CEFEPIME Sensitive <=8 ug/mL KERWIN Final   CIPROFLOXACIN Sensitive <=1 ug/mL KERWIN Final   GENTAMICIN Sensitive <=4 ug/mL KERWIN Final   IMIPENEM Sensitive <=1 ug/mL KERWIN Final   LEVOFLOXACIN Sensitive <=2 ug/mL KERWIN Final   NITROFURANTOIN Sensitive <=32 ug/mL KERWIN Final   TETRACYCLINE Sensitive <=4 ug/mL KERWIN Final   Trimethoprim/Sulfa Sensitive <=2/38 ug/mL KERWIN Final     Assessment & Plan  Mr. Samaniego is a 68 year old male with a history of elevated PSA status post negative biopsy who follows up with urinary retention.  We discussed his history of elevated PSA.  His PSA has been completely stable for the last 7 years, at which time he underwent a negative prostate biopsy.    Discussed continuing medications and CIC versus TURP for his urinary retention.  The procedure was described to the patient.    It was explained to the patient that due to his urinary retention there is no guarantee he will be able to empty following the prostate procedure.    Possible complications and side effects were discussed with the patient, including but not limited to, infection or sepsis, bleeding, irritative voiding symptoms, injury to the ureter, injury to adjacent organs and incontinence.  Anesthesia related risks were also discussed such as cardiovascular and pulmonary complications.      All patient's questions were answered, and the patient was consented.     Plan  Continue  "self-catheterization twice daily   the patient was scheduled and consented for \"transurethral resection of prostate\" under general anesthesia      "

## 2019-07-25 ENCOUNTER — OFFICE VISIT (OUTPATIENT)
Dept: FAMILY MEDICINE | Facility: OTHER | Age: 69
End: 2019-07-25
Attending: FAMILY MEDICINE
Payer: COMMERCIAL

## 2019-07-25 VITALS
SYSTOLIC BLOOD PRESSURE: 122 MMHG | WEIGHT: 215.6 LBS | HEIGHT: 76 IN | HEART RATE: 62 BPM | DIASTOLIC BLOOD PRESSURE: 72 MMHG | BODY MASS INDEX: 26.25 KG/M2 | TEMPERATURE: 96.5 F | RESPIRATION RATE: 16 BRPM

## 2019-07-25 DIAGNOSIS — N40.3 NODULAR PROSTATE WITH URINARY OBSTRUCTION: ICD-10-CM

## 2019-07-25 DIAGNOSIS — N13.8 NODULAR PROSTATE WITH URINARY OBSTRUCTION: ICD-10-CM

## 2019-07-25 DIAGNOSIS — Z01.818 PREOP GENERAL PHYSICAL EXAM: Primary | ICD-10-CM

## 2019-07-25 DIAGNOSIS — N18.30 CHRONIC KIDNEY DISEASE, STAGE III (MODERATE) (H): ICD-10-CM

## 2019-07-25 DIAGNOSIS — I10 ESSENTIAL HYPERTENSION: ICD-10-CM

## 2019-07-25 LAB
ANION GAP SERPL CALCULATED.3IONS-SCNC: 7 MMOL/L (ref 3–14)
BUN SERPL-MCNC: 32 MG/DL (ref 7–25)
CALCIUM SERPL-MCNC: 10.3 MG/DL (ref 8.6–10.3)
CHLORIDE SERPL-SCNC: 105 MMOL/L (ref 98–107)
CO2 SERPL-SCNC: 26 MMOL/L (ref 21–31)
CREAT SERPL-MCNC: 1.67 MG/DL (ref 0.7–1.3)
GFR SERPL CREATININE-BSD FRML MDRD: 41 ML/MIN/{1.73_M2}
GLUCOSE SERPL-MCNC: 104 MG/DL (ref 70–105)
POTASSIUM SERPL-SCNC: 3.6 MMOL/L (ref 3.5–5.1)
SODIUM SERPL-SCNC: 138 MMOL/L (ref 134–144)

## 2019-07-25 PROCEDURE — 99214 OFFICE O/P EST MOD 30 MIN: CPT | Performed by: FAMILY MEDICINE

## 2019-07-25 PROCEDURE — 80048 BASIC METABOLIC PNL TOTAL CA: CPT | Mod: ZL | Performed by: FAMILY MEDICINE

## 2019-07-25 PROCEDURE — 93010 ELECTROCARDIOGRAM REPORT: CPT | Performed by: INTERNAL MEDICINE

## 2019-07-25 PROCEDURE — G0463 HOSPITAL OUTPT CLINIC VISIT: HCPCS

## 2019-07-25 PROCEDURE — 93005 ELECTROCARDIOGRAM TRACING: CPT | Performed by: FAMILY MEDICINE

## 2019-07-25 PROCEDURE — 36415 COLL VENOUS BLD VENIPUNCTURE: CPT | Mod: ZL | Performed by: FAMILY MEDICINE

## 2019-07-25 ASSESSMENT — PAIN SCALES - GENERAL: PAINLEVEL: NO PAIN (0)

## 2019-07-25 ASSESSMENT — MIFFLIN-ST. JEOR: SCORE: 1834.46

## 2019-07-25 NOTE — PROGRESS NOTES
"  [facility]  [address]  [place] [address]  [phone]  Dept: [phone]    PRE-OP EVALUATION:  Today's date: 2019    David Samaniego (: 1950) presents for pre-operative evaluation assessment as requested by Dr. Liu.  He requires evaluation and anesthesia risk assessment prior to undergoing surgery/procedure for treatment of BPH .    Nursing Notes:   Nilam Mayfield LPN  2019  2:30 PM  Signed  Chief Complaint   Patient presents with     Pre-Op Exam     Patient presents to the clinic today for a preop on 19 with Dr. Liu for Turp    Date of Surgery: 19   Type of Surgery: turp  Surgeon: Dr. Liu  Hospital[facility]  Fax: na    Fever/Chills or other infectious symptoms in past month: no  >10lb weight loss in past two months: no  O2 SAT: 98    Health Care Directive/Code status:  yes  Hx of blood transfusions:   no  Td up to date:  yes  History of VRE/MRSA:  no Date: na    Preoperative Evaluation: Obstructive Sleep Apnea screening    S: Snore -  Do you snore loudly? (louder than talking or loud enough to be heard through closed doors)no  T: Tired - Do you often feel tired, fatigued, or sleepy during the daytime?no  O: Observed - Has anyone ever observed you stop breathing during your sleep?no  P: Pressure - Do you have or are you being treated for high blood pressure?no  B: BMI - BMI greater than 35kg/m2?no  A: Age - Age over 50 years old?yes  N: Neck - Neck circumference greater than 40 cm?no  G: Gender - Gender: Male?yes    Total number of \"YES\" responses:  1    Scoring: Low risk of SARAH 0-2  At Risk of SARAH: >3 High Risk of SARAH: 5-8    Nilam Mayfield 2019 2:18 PM    Medication Reconciliation: completed   Nilam Mayfield LPN  2019 2:10 PM         HPI:     HPI related to upcoming procedure: had significant obstruction of bladder recently.  Had a Garcia for 12 days, with great relief of symptoms.  Then did self cathing which has also helped, " THREE TIMES A DAY for 1 week, which seemed to really improve retention as well. Dad had the same issues.  History elevated Cr as well.      See problem list for active medical problems.  Problems all longstanding and stable, except as noted/documented.  See ROS for pertinent symptoms related to these conditions.      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 02/22/2018     Priority: Medium     Benign prostatic hyperplasia with weak urinary stream 01/09/2017     Priority: Medium     Erectile dysfunction 01/09/2017     Priority: Medium     H/O adenomatous polyp of colon 10/26/2015     Priority: Medium     Health care maintenance 10/21/2015     Priority: Medium     Lumbar disc herniation 12/26/2013     Priority: Medium     Elevated prostate specific antigen (PSA) 03/12/2013     Priority: Medium     Skin lesion 04/23/2012     Priority: Medium     Nodular prostate with urinary obstruction 04/17/2012     Priority: Medium     Backache 02/29/2012     Priority: Medium     Essential hypertension 09/12/2011     Priority: Medium     Disorder of skin or subcutaneous tissue 09/12/2011     Priority: Medium     Disturbance of skin sensation 03/14/2011     Priority: Medium     Chronic kidney disease, stage III (moderate) (H) 01/11/2011     Priority: Medium      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     No Comments Provided     Enlarged prostate without lower urinary tract symptoms (luts)     No Comments Provided     Essential (primary) hypertension     No Comments Provided     Gastro-esophageal reflux disease without esophagitis     No Comments Provided     Hypothyroidism     No Comments Provided     Other  "specified congenital malformations of intestine     No Comments Provided     Past Surgical History:   Procedure Laterality Date     BACK SURGERY      1986 and 1987     COLONOSCOPY      7/29/05,next due 2015     COLONOSCOPY  10/26/2015    Tubular adenoma,F/U 2020     OTHER SURGICAL HISTORY      HFF004,PARTIAL THYROIDECTOMY     OTHER SURGICAL HISTORY      231983,ANESTHESIA ALERT,Notes no prior complications from anesthesia.:     Current Outpatient Medications   Medication Sig Dispense Refill     acyclovir (ZOVIRAX) 400 MG tablet Take 1 tablet (400 mg) by mouth daily 90 tablet 3     FLUZONE HIGH-DOSE 0.5 ML injection        hydrochlorothiazide (HYDRODIURIL) 25 MG tablet Take 1 tablet (25 mg) by mouth daily 90 tablet 3     levothyroxine (SYNTHROID/LEVOTHROID) 150 MCG tablet Take 1 tablet (150 mcg) by mouth daily (with breakfast) 90 tablet 3     omeprazole (PRILOSEC) 20 MG DR capsule Take 1 capsule (20 mg) by mouth daily 90 capsule 3     OTC products: None, except as noted above    No Known Allergies   Latex Allergy: NO    Social History     Tobacco Use     Smoking status: Never Smoker     Smokeless tobacco: Never Used   Substance Use Topics     Alcohol use: Yes     Alcohol/week: 0.0 oz     Comment: Alcoholic Drinks/day: occasionally     History   Drug Use Unknown     Comment: Drug use: No       REVIEW OF SYSTEMS:   Constitutional, neuro, ENT, endocrine, pulmonary, cardiac, gastrointestinal, genitourinary, musculoskeletal, integument and psychiatric systems are negative, except as otherwise noted.    EXAM:   /72 (BP Location: Right arm, Patient Position: Sitting, Cuff Size: Adult Regular)   Pulse 62   Temp 96.5  F (35.8  C) (Tympanic)   Resp 16   Ht 1.93 m (6' 4\")   Wt 97.8 kg (215 lb 9.6 oz)   BMI 26.24 kg/m      GENERAL APPEARANCE: healthy, alert and no distress     EYES: EOMI,  PERRL     HENT: ear canals and TM's normal and nose and mouth without ulcers or lesions     NECK: no adenopathy, no asymmetry, " masses, or scars and thyroid normal to palpation     RESP: lungs clear to auscultation - no rales, rhonchi or wheezes     CV: regular rates and rhythm, normal S1 S2, no S3 or S4 and no murmur, click or rub     ABDOMEN:  soft, nontender, no HSM or masses and bowel sounds normal     MS: extremities normal- no gross deformities noted, no evidence of inflammation in joints, FROM in all extremities.     SKIN: no suspicious lesions or rashes     NEURO: Normal strength and tone, sensory exam grossly normal, mentation intact and speech normal     PSYCH: mentation appears normal. and affect normal/bright     LYMPHATICS: No cervical adenopathy    DIAGNOSTICS:     EKG: appears normal, NSR, sinus bradycardia, normal axis, normal intervals, no acute ST/T changes c/w ischemia, no LVH by voltage criteria, unchanged from previous tracings  Labs Resulted Today:   Results for orders placed or performed in visit on 07/25/19   Basic Metabolic Panel   Result Value Ref Range    Sodium 138 134 - 144 mmol/L    Potassium 3.6 3.5 - 5.1 mmol/L    Chloride 105 98 - 107 mmol/L    Carbon Dioxide 26 21 - 31 mmol/L    Anion Gap 7 3 - 14 mmol/L    Glucose 104 70 - 105 mg/dL    Urea Nitrogen 32 (H) 7 - 25 mg/dL    Creatinine 1.67 (H) 0.70 - 1.30 mg/dL    GFR Estimate 41 (L) >60 mL/min/[1.73_m2]    GFR Estimate If Black 49 (L) >60 mL/min/[1.73_m2]    Calcium 10.3 8.6 - 10.3 mg/dL       Recent Labs   Lab Test 01/23/19  1221 01/24/18  1059  01/13/15  1126  12/26/13  1001   HGB  --   --   --  15.2  --  14.7   PLT  --   --   --  279  --  268    139   < >  --    < >  --    POTASSIUM 4.0 3.9   < >  --    < >  --    CR 1.48* 1.78*   < >  --    < >  --     < > = values in this interval not displayed.        IMPRESSION:        The proposed surgical procedure is considered INTERMEDIATE risk.    REVISED CARDIAC RISK INDEX  The patient has the following serious cardiovascular risks for perioperative complications such as (MI, PE, VFib and 3  AV Block):  No  serious cardiac risks  INTERPRETATION: 0 risks: Class I (very low risk - 0.4% complication rate)    The patient has the following additional risks for perioperative complications:  No identified additional risks      ICD-10-CM    1. Preop general physical exam Z01.818    2. Essential hypertension I10 EKG 12-LEAD CLIN[facility] READ     Basic Metabolic Panel   3. Chronic kidney disease, stage III (moderate) (H) N18.3    4. Nodular prostate with urinary obstruction N40.3     N13.8        RECOMMENDATIONS:         --Patient is to take all scheduled medications on the day of surgery EXCEPT for modifications listed below.    APPROVAL GIVEN to proceed with proposed procedure, without further diagnostic evaluation       Signed Electronically by: Fady Penaloza MD    Copy of this evaluation report is provided to requesting physician.    [place] Preop Guidelines    Revised Cardiac Risk Index

## 2019-07-25 NOTE — H&P (VIEW-ONLY)
"  [facility] AND [facility]  [address]  [place] [address]  [phone]  Dept: [phone]    PRE-OP EVALUATION:  Today's date: 2019    David Samaniego (: 1950) presents for pre-operative evaluation assessment as requested by Dr. Liu.  He requires evaluation and anesthesia risk assessment prior to undergoing surgery/procedure for treatment of BPH .    Nursing Notes:   Nilam Mayfield LPN  2019  2:30 PM  Signed  Chief Complaint   Patient presents with     Pre-Op Exam     Patient presents to the clinic today for a preop on 19 with Dr. Liu for Turp    Date of Surgery: 19   Type of Surgery: turp  Surgeon: Dr. Liu  Hospital:  [facility]  Fax: na    Fever/Chills or other infectious symptoms in past month: no  >10lb weight loss in past two months: no  O2 SAT: 98    Health Care Directive/Code status:  yes  Hx of blood transfusions:   no  Td up to date:  yes  History of VRE/MRSA:  no Date: na    Preoperative Evaluation: Obstructive Sleep Apnea screening    S: Snore -  Do you snore loudly? (louder than talking or loud enough to be heard through closed doors)no  T: Tired - Do you often feel tired, fatigued, or sleepy during the daytime?no  O: Observed - Has anyone ever observed you stop breathing during your sleep?no  P: Pressure - Do you have or are you being treated for high blood pressure?no  B: BMI - BMI greater than 35kg/m2?no  A: Age - Age over 50 years old?yes  N: Neck - Neck circumference greater than 40 cm?no  G: Gender - Gender: Male?yes    Total number of \"YES\" responses:  1    Scoring: Low risk of SARAH 0-2  At Risk of SARAH: >3 High Risk of SARAH: 5-8    Nilam Mayfield 2019 2:18 PM    Medication Reconciliation: completed   Nilam Mayfield LPN  2019 2:10 PM         HPI:     HPI related to upcoming procedure: had significant obstruction of bladder recently.  Had a Garcia for 12 days, with great relief of symptoms.  Then did self cathing which has also helped, " THREE TIMES A DAY for 1 week, which seemed to really improve retention as well. Dad had the same issues.  History elevated Cr as well.      See problem list for active medical problems.  Problems all longstanding and stable, except as noted/documented.  See ROS for pertinent symptoms related to these conditions.      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 02/22/2018     Priority: Medium     Benign prostatic hyperplasia with weak urinary stream 01/09/2017     Priority: Medium     Erectile dysfunction 01/09/2017     Priority: Medium     H/O adenomatous polyp of colon 10/26/2015     Priority: Medium     Health care maintenance 10/21/2015     Priority: Medium     Lumbar disc herniation 12/26/2013     Priority: Medium     Elevated prostate specific antigen (PSA) 03/12/2013     Priority: Medium     Skin lesion 04/23/2012     Priority: Medium     Nodular prostate with urinary obstruction 04/17/2012     Priority: Medium     Backache 02/29/2012     Priority: Medium     Essential hypertension 09/12/2011     Priority: Medium     Disorder of skin or subcutaneous tissue 09/12/2011     Priority: Medium     Disturbance of skin sensation 03/14/2011     Priority: Medium     Chronic kidney disease, stage III (moderate) (H) 01/11/2011     Priority: Medium      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     No Comments Provided     Enlarged prostate without lower urinary tract symptoms (luts)     No Comments Provided     Essential (primary) hypertension     No Comments Provided     Gastro-esophageal reflux disease without esophagitis     No Comments Provided     Hypothyroidism     No Comments Provided     Other  "specified congenital malformations of intestine     No Comments Provided     Past Surgical History:   Procedure Laterality Date     BACK SURGERY      1986 and 1987     COLONOSCOPY      7/29/05,next due 2015     COLONOSCOPY  10/26/2015    Tubular adenoma,F/U 2020     OTHER SURGICAL HISTORY      [id number],PARTIAL THYROIDECTOMY     OTHER SURGICAL HISTORY      [id number],ANESTHESIA ALERT,Notes no prior complications from anesthesia.:     Current Outpatient Medications   Medication Sig Dispense Refill     acyclovir (ZOVIRAX) 400 MG tablet Take 1 tablet (400 mg) by mouth daily 90 tablet 3     FLUZONE HIGH-DOSE 0.5 ML injection        hydrochlorothiazide (HYDRODIURIL) 25 MG tablet Take 1 tablet (25 mg) by mouth daily 90 tablet 3     levothyroxine (SYNTHROID/LEVOTHROID) 150 MCG tablet Take 1 tablet (150 mcg) by mouth daily (with breakfast) 90 tablet 3     omeprazole (PRILOSEC) 20 MG DR capsule Take 1 capsule (20 mg) by mouth daily 90 capsule 3     OTC products: None, except as noted above    No Known Allergies   Latex Allergy: NO    Social History     Tobacco Use     Smoking status: Never Smoker     Smokeless tobacco: Never Used   Substance Use Topics     Alcohol use: Yes     Alcohol/week: 0.0 oz     Comment: Alcoholic Drinks/day: occasionally     History   Drug Use Unknown     Comment: Drug use: No       REVIEW OF SYSTEMS:   Constitutional, neuro, ENT, endocrine, pulmonary, cardiac, gastrointestinal, genitourinary, musculoskeletal, integument and psychiatric systems are negative, except as otherwise noted.    EXAM:   /72 (BP Location: Right arm, Patient Position: Sitting, Cuff Size: Adult Regular)   Pulse 62   Temp 96.5  F (35.8  C) (Tympanic)   Resp 16   Ht 1.93 m (6' 4\")   Wt 97.8 kg (215 lb 9.6 oz)   BMI 26.24 kg/m      GENERAL APPEARANCE: healthy, alert and no distress     EYES: EOMI,  PERRL     HENT: ear canals and TM's normal and nose and mouth without ulcers or lesions     NECK: no adenopathy, no asymmetry, " masses, or scars and thyroid normal to palpation     RESP: lungs clear to auscultation - no rales, rhonchi or wheezes     CV: regular rates and rhythm, normal S1 S2, no S3 or S4 and no murmur, click or rub     ABDOMEN:  soft, nontender, no HSM or masses and bowel sounds normal     MS: extremities normal- no gross deformities noted, no evidence of inflammation in joints, FROM in all extremities.     SKIN: no suspicious lesions or rashes     NEURO: Normal strength and tone, sensory exam grossly normal, mentation intact and speech normal     PSYCH: mentation appears normal. and affect normal/bright     LYMPHATICS: No cervical adenopathy    DIAGNOSTICS:     EKG: appears normal, NSR, sinus bradycardia, normal axis, normal intervals, no acute ST/T changes c/w ischemia, no LVH by voltage criteria, unchanged from previous tracings  Labs Resulted Today:   Results for orders placed or performed in visit on 07/25/19   Basic Metabolic Panel   Result Value Ref Range    Sodium 138 134 - 144 mmol/L    Potassium 3.6 3.5 - 5.1 mmol/L    Chloride 105 98 - 107 mmol/L    Carbon Dioxide 26 21 - 31 mmol/L    Anion Gap 7 3 - 14 mmol/L    Glucose 104 70 - 105 mg/dL    Urea Nitrogen 32 (H) 7 - 25 mg/dL    Creatinine 1.67 (H) 0.70 - 1.30 mg/dL    GFR Estimate 41 (L) >60 mL/min/[1.73_m2]    GFR Estimate If Black 49 (L) >60 mL/min/[1.73_m2]    Calcium 10.3 8.6 - 10.3 mg/dL       Recent Labs   Lab Test 01/23/19  1221 01/24/18  1059  01/13/15  1126  12/26/13  1001   HGB  --   --   --  15.2  --  14.7   PLT  --   --   --  279  --  268    139   < >  --    < >  --    POTASSIUM 4.0 3.9   < >  --    < >  --    CR 1.48* 1.78*   < >  --    < >  --     < > = values in this interval not displayed.        IMPRESSION:        The proposed surgical procedure is considered INTERMEDIATE risk.    REVISED CARDIAC RISK INDEX  The patient has the following serious cardiovascular risks for perioperative complications such as (MI, PE, VFib and 3  AV Block):  No  serious cardiac risks  INTERPRETATION: 0 risks: Class I (very low risk - 0.4% complication rate)    The patient has the following additional risks for perioperative complications:  No identified additional risks      ICD-10-CM    1. Preop general physical exam Z01.818    2. Essential hypertension I10 EKG 12-LEAD CLINIC READ     Basic Metabolic Panel   3. Chronic kidney disease, stage III (moderate) (H) N18.3    4. Nodular prostate with urinary obstruction N40.3     N13.8        RECOMMENDATIONS:         --Patient is to take all scheduled medications on the day of surgery EXCEPT for modifications listed below.    APPROVAL GIVEN to proceed with proposed procedure, without further diagnostic evaluation       Signed Electronically by: Fady Penaloza MD    Copy of this evaluation report is provided to requesting physician.    [facility] Preop Guidelines    Revised Cardiac Risk Index

## 2019-07-25 NOTE — NURSING NOTE
"Chief Complaint   Patient presents with     Pre-Op Exam     Patient presents to the clinic today for a preop on 07/30/19 with Dr. Liu for [facility]    Date of Surgery: 7/30/19   Type of Surgery: turp  Surgeon: Dr. Liu  Hospital:  [facility]  Fax: na    Fever/Chills or other infectious symptoms in past month: no  >10lb weight loss in past two months: no  O2 SAT: 98    Health Care Directive/Code status:  yes  Hx of blood transfusions:   no  Td up to date:  yes  History of VRE/MRSA:  no Date: na    Preoperative Evaluation: Obstructive Sleep Apnea screening    S: Snore -  Do you snore loudly? (louder than talking or loud enough to be heard through closed doors)no  T: Tired - Do you often feel tired, fatigued, or sleepy during the daytime?no  O: Observed - Has anyone ever observed you stop breathing during your sleep?no  P: Pressure - Do you have or are you being treated for high blood pressure?no  B: BMI - BMI greater than 35kg/m2?no  A: Age - Age over 50 years old?yes  N: Neck - Neck circumference greater than 40 cm?no  G: Gender - Gender: Male?yes    Total number of \"YES\" responses:  1    Scoring: Low risk of SARAH 0-2  At Risk of SARAH: >3 High Risk of SARAH: 5-8    Nilam Mayfield 7/25/2019 2:18 PM    Medication Reconciliation: completed   Nilam Mayfield LPN  7/25/2019 2:10 PM   "

## 2019-07-26 RX ORDER — ASPIRIN 81 MG/1
81 TABLET ORAL DAILY
COMMUNITY
End: 2022-02-28

## 2019-07-29 ENCOUNTER — ANESTHESIA EVENT (OUTPATIENT)
Dept: SURGERY | Facility: OTHER | Age: 69
End: 2019-07-29
Payer: COMMERCIAL

## 2019-07-29 RX ORDER — AMPICILLIN 2 G/1
2 INJECTION, POWDER, FOR SOLUTION INTRAVENOUS
Status: COMPLETED | OUTPATIENT
Start: 2019-07-29 | End: 2019-07-30

## 2019-07-29 RX ORDER — FENTANYL CITRATE 50 UG/ML
25-50 INJECTION, SOLUTION INTRAMUSCULAR; INTRAVENOUS
Status: CANCELLED | OUTPATIENT
Start: 2019-07-29

## 2019-07-29 RX ORDER — CEFTRIAXONE SODIUM 1 G/50ML
1 INJECTION, SOLUTION INTRAVENOUS
Status: CANCELLED | OUTPATIENT
Start: 2019-07-29

## 2019-07-30 ENCOUNTER — HOSPITAL ENCOUNTER (OUTPATIENT)
Facility: OTHER | Age: 69
Discharge: HOME | End: 2019-07-31
Attending: UROLOGY | Admitting: UROLOGY
Payer: COMMERCIAL

## 2019-07-30 ENCOUNTER — ANESTHESIA (OUTPATIENT)
Dept: SURGERY | Facility: OTHER | Age: 69
End: 2019-07-30
Payer: COMMERCIAL

## 2019-07-30 PROBLEM — N40.1 BPH WITH URINARY OBSTRUCTION: Status: ACTIVE | Noted: 2019-07-30

## 2019-07-30 PROBLEM — N13.8 BPH WITH URINARY OBSTRUCTION: Status: ACTIVE | Noted: 2019-07-30

## 2019-07-30 PROCEDURE — 36000056 ZZH SURGERY LEVEL 3 1ST 30 MIN: Performed by: UROLOGY

## 2019-07-30 PROCEDURE — 25800030 ZZH RX IP 258 OP 636: Performed by: NURSE ANESTHETIST, CERTIFIED REGISTERED

## 2019-07-30 PROCEDURE — 40000306 ZZH STATISTIC PRE PROC ASSESS II: Performed by: UROLOGY

## 2019-07-30 PROCEDURE — 25800025 ZZH RX 258: Performed by: UROLOGY

## 2019-07-30 PROCEDURE — 88305 TISSUE EXAM BY PATHOLOGIST: CPT

## 2019-07-30 PROCEDURE — 25000125 ZZHC RX 250: Performed by: NURSE ANESTHETIST, CERTIFIED REGISTERED

## 2019-07-30 PROCEDURE — 52601 PROSTATECTOMY (TURP): CPT | Performed by: UROLOGY

## 2019-07-30 PROCEDURE — 25000125 ZZHC RX 250: Performed by: UROLOGY

## 2019-07-30 PROCEDURE — 25800030 ZZH RX IP 258 OP 636: Performed by: UROLOGY

## 2019-07-30 PROCEDURE — 37000009 ZZH ANESTHESIA TECHNICAL FEE, EACH ADDTL 15 MIN: Performed by: UROLOGY

## 2019-07-30 PROCEDURE — 25000128 H RX IP 250 OP 636: Performed by: NURSE ANESTHETIST, CERTIFIED REGISTERED

## 2019-07-30 PROCEDURE — 36000058 ZZH SURGERY LEVEL 3 EA 15 ADDTL MIN: Performed by: UROLOGY

## 2019-07-30 PROCEDURE — 37000008 ZZH ANESTHESIA TECHNICAL FEE, 1ST 30 MIN: Performed by: UROLOGY

## 2019-07-30 PROCEDURE — 71000014 ZZH RECOVERY PHASE 1 LEVEL 2 FIRST HR: Performed by: UROLOGY

## 2019-07-30 PROCEDURE — 52601 PROSTATECTOMY (TURP): CPT | Performed by: NURSE ANESTHETIST, CERTIFIED REGISTERED

## 2019-07-30 PROCEDURE — 25000128 H RX IP 250 OP 636: Performed by: UROLOGY

## 2019-07-30 PROCEDURE — 99100 ANES PT EXTEME AGE<1 YR&>70: CPT | Performed by: NURSE ANESTHETIST, CERTIFIED REGISTERED

## 2019-07-30 PROCEDURE — 27210794 ZZH OR GENERAL SUPPLY STERILE: Performed by: UROLOGY

## 2019-07-30 RX ORDER — SODIUM CHLORIDE 9 MG/ML
INJECTION, SOLUTION INTRAVENOUS CONTINUOUS
Status: DISCONTINUED | OUTPATIENT
Start: 2019-07-30 | End: 2019-07-31 | Stop reason: HOSPADM

## 2019-07-30 RX ORDER — SODIUM CHLORIDE 9 MG/ML
INJECTION, SOLUTION INTRAVENOUS CONTINUOUS
Status: DISCONTINUED | OUTPATIENT
Start: 2019-07-30 | End: 2019-07-30 | Stop reason: HOSPADM

## 2019-07-30 RX ORDER — LIDOCAINE 40 MG/G
CREAM TOPICAL
Status: DISCONTINUED | OUTPATIENT
Start: 2019-07-30 | End: 2019-07-30 | Stop reason: HOSPADM

## 2019-07-30 RX ORDER — NALOXONE HYDROCHLORIDE 0.4 MG/ML
.1-.4 INJECTION, SOLUTION INTRAMUSCULAR; INTRAVENOUS; SUBCUTANEOUS
Status: DISCONTINUED | OUTPATIENT
Start: 2019-07-30 | End: 2019-07-31 | Stop reason: HOSPADM

## 2019-07-30 RX ORDER — ONDANSETRON 2 MG/ML
4 INJECTION INTRAMUSCULAR; INTRAVENOUS EVERY 6 HOURS PRN
Status: DISCONTINUED | OUTPATIENT
Start: 2019-07-30 | End: 2019-07-31 | Stop reason: HOSPADM

## 2019-07-30 RX ORDER — FENTANYL CITRATE 50 UG/ML
25-50 INJECTION, SOLUTION INTRAMUSCULAR; INTRAVENOUS
Status: DISCONTINUED | OUTPATIENT
Start: 2019-07-30 | End: 2019-07-30 | Stop reason: HOSPADM

## 2019-07-30 RX ORDER — HYDROMORPHONE HYDROCHLORIDE 1 MG/ML
.3-.5 INJECTION, SOLUTION INTRAMUSCULAR; INTRAVENOUS; SUBCUTANEOUS EVERY 10 MIN PRN
Status: DISCONTINUED | OUTPATIENT
Start: 2019-07-30 | End: 2019-07-30 | Stop reason: HOSPADM

## 2019-07-30 RX ORDER — ATROPA BELLADONNA AND OPIUM 16.2; 3 MG/1; MG/1
SUPPOSITORY RECTAL PRN
Status: DISCONTINUED | OUTPATIENT
Start: 2019-07-30 | End: 2019-07-30 | Stop reason: HOSPADM

## 2019-07-30 RX ORDER — ONDANSETRON 4 MG/1
4 TABLET, ORALLY DISINTEGRATING ORAL EVERY 6 HOURS PRN
Status: DISCONTINUED | OUTPATIENT
Start: 2019-07-30 | End: 2019-07-31 | Stop reason: HOSPADM

## 2019-07-30 RX ORDER — FENTANYL CITRATE 50 UG/ML
INJECTION, SOLUTION INTRAMUSCULAR; INTRAVENOUS PRN
Status: DISCONTINUED | OUTPATIENT
Start: 2019-07-30 | End: 2019-07-30

## 2019-07-30 RX ORDER — ONDANSETRON 2 MG/ML
4 INJECTION INTRAMUSCULAR; INTRAVENOUS EVERY 30 MIN PRN
Status: DISCONTINUED | OUTPATIENT
Start: 2019-07-30 | End: 2019-07-30 | Stop reason: HOSPADM

## 2019-07-30 RX ORDER — ATROPA BELLADONNA AND OPIUM 16.2; 3 MG/1; MG/1
30 SUPPOSITORY RECTAL 3 TIMES DAILY PRN
Status: DISCONTINUED | OUTPATIENT
Start: 2019-07-30 | End: 2019-07-31 | Stop reason: HOSPADM

## 2019-07-30 RX ORDER — PROPOFOL 10 MG/ML
INJECTION, EMULSION INTRAVENOUS PRN
Status: DISCONTINUED | OUTPATIENT
Start: 2019-07-30 | End: 2019-07-30

## 2019-07-30 RX ORDER — NALOXONE HYDROCHLORIDE 0.4 MG/ML
.1-.4 INJECTION, SOLUTION INTRAMUSCULAR; INTRAVENOUS; SUBCUTANEOUS
Status: DISCONTINUED | OUTPATIENT
Start: 2019-07-30 | End: 2019-07-30 | Stop reason: HOSPADM

## 2019-07-30 RX ORDER — DEXAMETHASONE SODIUM PHOSPHATE 4 MG/ML
INJECTION, SOLUTION INTRA-ARTICULAR; INTRALESIONAL; INTRAMUSCULAR; INTRAVENOUS; SOFT TISSUE PRN
Status: DISCONTINUED | OUTPATIENT
Start: 2019-07-30 | End: 2019-07-30

## 2019-07-30 RX ORDER — ONDANSETRON 4 MG/1
4 TABLET, ORALLY DISINTEGRATING ORAL EVERY 30 MIN PRN
Status: DISCONTINUED | OUTPATIENT
Start: 2019-07-30 | End: 2019-07-30 | Stop reason: HOSPADM

## 2019-07-30 RX ORDER — LIDOCAINE 40 MG/G
CREAM TOPICAL
Status: DISCONTINUED | OUTPATIENT
Start: 2019-07-30 | End: 2019-07-31 | Stop reason: HOSPADM

## 2019-07-30 RX ORDER — HYDROCODONE BITARTRATE AND ACETAMINOPHEN 5; 325 MG/1; MG/1
1-2 TABLET ORAL EVERY 4 HOURS PRN
Status: DISCONTINUED | OUTPATIENT
Start: 2019-07-30 | End: 2019-07-31 | Stop reason: HOSPADM

## 2019-07-30 RX ORDER — OXYCODONE AND ACETAMINOPHEN 5; 325 MG/1; MG/1
1-2 TABLET ORAL EVERY 4 HOURS PRN
Status: DISCONTINUED | OUTPATIENT
Start: 2019-07-30 | End: 2019-07-30

## 2019-07-30 RX ORDER — MEPERIDINE HYDROCHLORIDE 50 MG/ML
12.5 INJECTION INTRAMUSCULAR; INTRAVENOUS; SUBCUTANEOUS
Status: DISCONTINUED | OUTPATIENT
Start: 2019-07-30 | End: 2019-07-30 | Stop reason: HOSPADM

## 2019-07-30 RX ORDER — NEOMYCIN/BACITRACIN/POLYMYXINB 3.5-400-5K
OINTMENT (GRAM) TOPICAL 4 TIMES DAILY PRN
Status: DISCONTINUED | OUTPATIENT
Start: 2019-07-30 | End: 2019-07-31 | Stop reason: HOSPADM

## 2019-07-30 RX ORDER — HYDROMORPHONE HYDROCHLORIDE 1 MG/ML
0.2 INJECTION, SOLUTION INTRAMUSCULAR; INTRAVENOUS; SUBCUTANEOUS
Status: DISCONTINUED | OUTPATIENT
Start: 2019-07-30 | End: 2019-07-31 | Stop reason: HOSPADM

## 2019-07-30 RX ORDER — ONDANSETRON 2 MG/ML
INJECTION INTRAMUSCULAR; INTRAVENOUS PRN
Status: DISCONTINUED | OUTPATIENT
Start: 2019-07-30 | End: 2019-07-30

## 2019-07-30 RX ORDER — PROPOFOL 10 MG/ML
INJECTION, EMULSION INTRAVENOUS CONTINUOUS PRN
Status: DISCONTINUED | OUTPATIENT
Start: 2019-07-30 | End: 2019-07-30

## 2019-07-30 RX ORDER — ACETAMINOPHEN 10 MG/ML
INJECTION, SOLUTION INTRAVENOUS PRN
Status: DISCONTINUED | OUTPATIENT
Start: 2019-07-30 | End: 2019-07-30

## 2019-07-30 RX ORDER — LIDOCAINE HYDROCHLORIDE 20 MG/ML
INJECTION, SOLUTION INFILTRATION; PERINEURAL PRN
Status: DISCONTINUED | OUTPATIENT
Start: 2019-07-30 | End: 2019-07-30

## 2019-07-30 RX ADMIN — SODIUM CHLORIDE: 9 INJECTION, SOLUTION INTRAVENOUS at 14:31

## 2019-07-30 RX ADMIN — ACETAMINOPHEN 1000 MG: 10 INJECTION, SOLUTION INTRAVENOUS at 10:38

## 2019-07-30 RX ADMIN — PROPOFOL 200 MCG/KG/MIN: 10 INJECTION, EMULSION INTRAVENOUS at 10:12

## 2019-07-30 RX ADMIN — LIDOCAINE HYDROCHLORIDE 80 MG: 20 INJECTION, SOLUTION INFILTRATION; PERINEURAL at 10:11

## 2019-07-30 RX ADMIN — SODIUM CHLORIDE 3000 ML: 900 IRRIGANT IRRIGATION at 14:34

## 2019-07-30 RX ADMIN — AMPICILLIN SODIUM 2 G: 2 INJECTION, POWDER, FOR SOLUTION INTRAMUSCULAR; INTRAVENOUS at 08:35

## 2019-07-30 RX ADMIN — ONDANSETRON 4 MG: 2 INJECTION INTRAMUSCULAR; INTRAVENOUS at 10:17

## 2019-07-30 RX ADMIN — FENTANYL CITRATE 25 MCG: 50 INJECTION, SOLUTION INTRAMUSCULAR; INTRAVENOUS at 11:00

## 2019-07-30 RX ADMIN — GENTAMICIN SULFATE 460 MG: 40 INJECTION, SOLUTION INTRAMUSCULAR; INTRAVENOUS at 09:49

## 2019-07-30 RX ADMIN — DEXAMETHASONE SODIUM PHOSPHATE 4 MG: 4 INJECTION, SOLUTION INTRA-ARTICULAR; INTRALESIONAL; INTRAMUSCULAR; INTRAVENOUS; SOFT TISSUE at 10:20

## 2019-07-30 RX ADMIN — FENTANYL CITRATE 25 MCG: 50 INJECTION, SOLUTION INTRAMUSCULAR; INTRAVENOUS at 11:05

## 2019-07-30 RX ADMIN — PROPOFOL 100 MG: 10 INJECTION, EMULSION INTRAVENOUS at 10:11

## 2019-07-30 RX ADMIN — SODIUM CHLORIDE: 9 INJECTION, SOLUTION INTRAVENOUS at 08:35

## 2019-07-30 RX ADMIN — FENTANYL CITRATE 25 MCG: 50 INJECTION, SOLUTION INTRAMUSCULAR; INTRAVENOUS at 10:34

## 2019-07-30 RX ADMIN — FENTANYL CITRATE 25 MCG: 50 INJECTION, SOLUTION INTRAMUSCULAR; INTRAVENOUS at 10:18

## 2019-07-30 RX ADMIN — SODIUM CHLORIDE 3000 ML: 900 IRRIGANT IRRIGATION at 11:23

## 2019-07-30 RX ADMIN — SODIUM CHLORIDE 3000 ML: 900 IRRIGANT IRRIGATION at 18:12

## 2019-07-30 RX ADMIN — SODIUM CHLORIDE 6000 ML: 900 IRRIGANT IRRIGATION at 21:44

## 2019-07-30 ASSESSMENT — ACTIVITIES OF DAILY LIVING (ADL)
RETIRED_COMMUNICATION: 0-->UNDERSTANDS/COMMUNICATES WITHOUT DIFFICULTY
FALL_HISTORY_WITHIN_LAST_SIX_MONTHS: NO
TRANSFERRING: 0-->INDEPENDENT
SWALLOWING: 0-->SWALLOWS FOODS/LIQUIDS WITHOUT DIFFICULTY
RETIRED_EATING: 0-->INDEPENDENT
AMBULATION: 0-->INDEPENDENT
BATHING: 0-->INDEPENDENT
TOILETING: 0-->INDEPENDENT
DRESS: 0-->INDEPENDENT
COGNITION: 0 - NO COGNITION ISSUES REPORTED

## 2019-07-30 NOTE — DISCHARGE INSTRUCTIONS
[facility]  Adult Discharge Orders & Instructions    ________________________________________________________________          For 12 hours after surgery  1. Get plenty of rest.  A responsible adult must stay with you for at least 12 hours after you leave the hospital.   2. You may feel lightheaded.  IF so, sit for a few minutes before standing.  Have someone help you get up.   3. You may have a slight fever. Call the doctor if your fever is over 101 F (38.3 C) (taken under the tongue) or lasts longer than 24 hours.  4. You may have a dry mouth, a sore throat, muscle aches or trouble sleeping.  These should go away after 24 hours.  5. Do not make important or legal decisions.  6.   Do not drive or use heavy equipment.  If you have weakness or tingling, don't drive or use heavy equipment until this feeling goes away.    To contact a doctor, call   [phone]_______________________

## 2019-07-30 NOTE — OP NOTE
Preoperative diagnosis  Clinical BPH with lower urinary tract symptoms including urinary retention    Postoperative diagnosis  Clinical BPH with lower urinary tract symptoms including urinary retention    Procedure performed  Transurethral resection of prostate, bipolar    Surgeon  Art Liu MD    Surgeon(s)/Proceduralist(s) and Assistants (if any)  Surgeon(s):  Art Liu MD  Circulator: Elvira Quan RN  First Assistant: Ramonita Gonzalez RN  Assist Patient Positioning: Fallon Tarango RN  2nd Scrub: Juve Carrillo  Pre-Op Nurse: Denise Bey RN    Specimen(s)  Yes, prostate chips    (EBL) Estimated blood loss (ml)  50    Anesthesia  General    Complications  None    Findings  EUA revealed 50-60g prostate, no nodules    Cystoscopy revealed trilobar prostatic hyperplasia.  There were no abnormalities within the bladder with no bladder tumors or stones.  Ureteral orifices were in the normal anatomic position.  Post-procedure cystoscopy revealed bilateral ureteral orifices that were uninvolved in the resection.  Resection did not extend distal to the verumontanum.    Indications  69 year old male agreed to undergo the above named procedure after discussion of the alternatives, risks and benefits.    He was found to have significant lower urinary tract symptoms consistent with clinical BPH.  These were refractory to oral medication.  Informed consent was obtained.      Procedure  The patient was taken to the operating room and placed supine on the operating table.  Pre-operative antibiotics were administered.  Bilateral lower extremity SCDs were placed.  After induction of anesthesia the patient was positioned in dorsal lithotomy.  A time-out was performed.  An exam under anesthesia was performed with the above described findings.  The patient was prepped and draped in a sterile fashion.      Serial dilation of the meatus was performed to 30 Yoruba in order to allow the scope to safely be passed  through the urethra.    A 27 Occitan continuous flow resectoscope was introduced into the urethra and bladder.  Using the band working element, the prostate was treated in a systematic fashion.  First the left and right lateral lobes were treated followed by the median lobe.  Hemostasis was assured throughout the case.  The bilateral ureteral orifices and verumontanum were used as landmarks and repeatedly visualized throughout the procedure.  Care was taken to carry the treatment of the prostate up to, but not distal to, the verumontanum.  A 22 Occitan catheter was placed without difficulty.      The patient tolerated the procedure well, was awakened, extubated and transferred to the recovery room in stable condition.    Plan  Admit overnight for observation  Follow up in clinic for an early am appt in 1 week on a Tuesday.

## 2019-07-30 NOTE — ANESTHESIA POSTPROCEDURE EVALUATION
Patient: David Samaniego    Procedure(s):  CYSTOSCOPY, WITH TRANSURETHRAL RESECTION (TUR) PROSTATE    Diagnosis:benign prostatic hypertrophy with obstruction  Diagnosis Additional Information: No value filed.    Anesthesia Type:  General, LMA    Note:  Anesthesia Post Evaluation    Patient location during evaluation: PACU  Patient participation: Able to fully participate in evaluation  Level of consciousness: awake and alert  Pain management: adequate  Airway patency: patent  Cardiovascular status: acceptable  Respiratory status: acceptable  Hydration status: acceptable  PONV: none     Anesthetic complications: None          Last vitals:  Vitals:    07/30/19 1140 07/30/19 1145 07/30/19 1150   BP: 121/65 106/67    Pulse: (!) 44 (!) 43    Resp: 15  14   Temp:      SpO2: 95% 96% 96%         Electronically Signed By: CODI DEMPSEY CRNA  July 30, 2019  11:56 AM

## 2019-07-30 NOTE — PLAN OF CARE
VS are stable. Pulse is running in the mid 40s. Will continue to monitor. /61   Pulse (!) 45   Temp 95.9  F (35.5  C) (Tympanic)   Resp 10   Wt 97.8 kg (215 lb 9 oz)   SpO2 99%   BMI 26.24 kg/m  . Denies pain or nausea.

## 2019-07-30 NOTE — ANESTHESIA PREPROCEDURE EVALUATION
Anesthesia Pre-Procedure Evaluation    Patient: David Samaniego   MRN: [id number] : 1950          Preoperative Diagnosis: benign prostatic hypertrophy with obstruction    Procedure(s):  CYSTOSCOPY, WITH TRANSURETHRAL RESECTION (TUR) PROSTATE    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     No Comments Provided     Enlarged prostate without lower urinary tract symptoms (luts)     No Comments Provided     Essential (primary) hypertension     No Comments Provided     Gastro-esophageal reflux disease without esophagitis     No Comments Provided     Hypothyroidism     No Comments Provided     Other specified congenital malformations of intestine     No Comments Provided     PONV (postoperative nausea and vomiting)     with surgeries in the 80's     Past Surgical History:   Procedure Laterality Date     BACK SURGERY       and      C TUR PROSTATE BIOPSIES       COLONOSCOPY      05,next due      COLONOSCOPY  10/26/2015    Tubular adenoma,F/U      OTHER SURGICAL HISTORY      [id number],PARTIAL THYROIDECTOMY     OTHER SURGICAL HISTORY      [id number],ANESTHESIA ALERT,Notes no prior complications from anesthesia.:       Anesthesia Evaluation     . Pt has had prior anesthetic.     History of anesthetic complications   - PONV  no trouble after most recent colonoscopy      ROS/MED HX    ENT/Pulmonary:  - neg pulmonary ROS     Neurologic:  - neg neurologic ROS     Cardiovascular:     (+) hypertension----. : . . . :. .       METS/Exercise Tolerance:  >4 METS   Hematologic:  - neg hematologic  ROS       Musculoskeletal:  - neg musculoskeletal ROS       GI/Hepatic:     (+) GERD Asymptomatic on medication,       Renal/Genitourinary:     (+) chronic renal disease, type: CRI,       Endo:  - neg endo ROS       Psychiatric:  - neg psychiatric ROS       Infectious Disease:  - neg  "infectious disease ROS       Malignancy:      - no malignancy   Other:    - neg other ROS                      Physical Exam  Normal systems: cardiovascular, pulmonary and dental    Airway   Mallampati: II  TM distance: >3 FB  Neck ROM: full    Dental     Cardiovascular   Rhythm and rate: regular and normal      Pulmonary    breath sounds clear to auscultation            Lab Results   Component Value Date    HGB 15.2 01/13/2015    HCT 45.7 01/13/2015     01/13/2015     07/25/2019    POTASSIUM 3.6 07/25/2019    CHLORIDE 105 07/25/2019    CO2 26 07/25/2019    BUN 32 (H) 07/25/2019    CR 1.67 (H) 07/25/2019     07/25/2019    EBNITO 10.3 07/25/2019    ALBUMIN 4.4 12/26/2013    PROTTOTAL 7.4 12/26/2013       Preop Vitals  BP Readings from Last 3 Encounters:   07/30/19 (!) 148/89   07/25/19 122/72   06/24/19 160/88    Pulse Readings from Last 3 Encounters:   07/30/19 70   07/25/19 62   07/01/19 68      Resp Readings from Last 3 Encounters:   07/30/19 16   07/25/19 16   07/01/19 12    SpO2 Readings from Last 3 Encounters:   07/30/19 99%   06/07/19 98%   03/24/14 96%      Temp Readings from Last 1 Encounters:   07/30/19 97.3  F (36.3  C) (Tympanic)    Ht Readings from Last 1 Encounters:   07/25/19 1.93 m (6' 4\")      Wt Readings from Last 1 Encounters:   07/30/19 97.8 kg (215 lb 9 oz)    Estimated body mass index is 26.24 kg/m  as calculated from the following:    Height as of 7/25/19: 1.93 m (6' 4\").    Weight as of this encounter: 97.8 kg (215 lb 9 oz).       Anesthesia Plan      History & Physical Review      ASA Status:  2 .    NPO Status:  > 6 hours    Plan for General and LMA with Propofol induction. Maintenance will be TIVA.    PONV prophylaxis:  Ondansetron (or other 5HT-3) and Dexamethasone or Solumedrol (Pt refused scopalomine patch)       Postoperative Care  Postoperative pain management:  IV analgesics and Multi-modal analgesia.      Consents  Anesthetic plan, risks, benefits and alternatives " discussed with:  Patient..                 CODI DEMPSEY CRNA

## 2019-07-30 NOTE — PROGRESS NOTES
Patient denies pain, was ambulatory in halls x1 with minimal assist.   Nhung Hudson RN on 7/30/2019 at 6:49 PM

## 2019-07-30 NOTE — OR NURSING
PACU Transfer Note    David Samaniego was transferred to [facility] via cart.  Equipment used for transport:  none.  Accompanied by:  RN x 2  Prescriptions were: none    PACU Respiratory Event Documentation     1) Episodes of Apnea greater than or equal to 10 seconds: 0    2) Bradypnea - less than 8 breaths per minute: 0    3) Pain score on 0 to 10 scale: 0    4) Pain-sedation mismatch (yes or no): no    5) Repeated 02 desaturation less than 90% (yes or no): no    Anesthesia notified? (yes or no): no    Any of the above events occuring repeatedly in separate 30 minute intervals may be considered recurrent PACU respiratory events.    Patient stable and meets phase 1 discharge criteria for transport from PACU.

## 2019-07-30 NOTE — ANESTHESIA CARE TRANSFER NOTE
Patient: David Samaniego    Procedure(s):  CYSTOSCOPY, WITH TRANSURETHRAL RESECTION (TUR) PROSTATE    Diagnosis: benign prostatic hypertrophy with obstruction  Diagnosis Additional Information: No value filed.    Anesthesia Type:   General, LMA     Note:  Airway :Face Mask  Patient transferred to:PACU  Handoff Report: Identifed the Patient, Identified the Reponsible Provider, Reviewed the pertinent medical history, Discussed the surgical course, Reviewed Intra-OP anesthesia mangement and issues during anesthesia, Set expectations for post-procedure period and Allowed opportunity for questions and acknowledgement of understanding      Vitals: (Last set prior to Anesthesia Care Transfer)    CRNA VITALS  7/30/2019 1041 - 7/30/2019 1115      7/30/2019             Resp Rate (set):  10                Electronically Signed By: CODI AGARWAL CRNA  July 30, 2019  11:15 AM

## 2019-07-30 NOTE — PROGRESS NOTES
Admission Note    Data:  David Samaniego admitted to med/surg, room 315, from surgery at 1157. CBI light cherry color with no clots.     Action:  Dr. Liu has been notified of admission. Pt oriented to unit, call light in reach.     Response:  Patient is alert and orientated x4. Is adjusting to the unit well. Denies nausea or pain. Pt only states discomfort and feeling of urinary urgency with CBI. Pt was educated and informed that is a normal symptom with the CBI.     Matilde Salas RN on 7/30/2019 at 2:50 PM

## 2019-07-31 VITALS
OXYGEN SATURATION: 98 % | HEART RATE: 45 BPM | WEIGHT: 220.8 LBS | SYSTOLIC BLOOD PRESSURE: 132 MMHG | RESPIRATION RATE: 16 BRPM | BODY MASS INDEX: 26.88 KG/M2 | DIASTOLIC BLOOD PRESSURE: 73 MMHG | TEMPERATURE: 97.9 F

## 2019-07-31 PROCEDURE — 25800030 ZZH RX IP 258 OP 636: Performed by: UROLOGY

## 2019-07-31 RX ADMIN — SODIUM CHLORIDE: 9 INJECTION, SOLUTION INTRAVENOUS at 01:53

## 2019-07-31 NOTE — PLAN OF CARE
Walked the hallways x2. Bladder irrigation at a slow rate, urine pink in color with several clots. /62   Pulse (!) 45   Temp 97.3  F (36.3  C) (Tympanic)   Resp 16   Wt 97.8 kg (215 lb 9 oz)   SpO2 96%   BMI 26.24 kg/m

## 2019-07-31 NOTE — PROGRESS NOTES
Post void trial completed. Instilled 300 mL's via CBI into patients bladder. Able to void immediately with 350 mL's out. PVR bladder scan completed and highest reading of 30 mL's still in bladder. Updated Dr. Liu. Bucky rodriguez MD to discharge patient home without menard. Appointment made for patient to follow up with Dr. Liu next week in the clinic. Anticipated discharge home today with spouse.    Keegan Tarango RN 07/31/19 9:41 AM

## 2019-07-31 NOTE — PROGRESS NOTES
Discharge Note      Data:  David Samaniego discharged to home at 10:05 am via ambulation. Accompanied by spouse and staff.    Action:  Written discharge/follow-up instructions were provided to patient. Prescriptions sent to patients preferred pharmacy. All belongings sent with patient.    Response:  Anuel verbalized understanding of discharge instructions, reason for discharge, and necessary follow-up appointments. No further questions or concerns at this time.    Keegan Tarango RN 07/31/19 10:07 AM

## 2019-07-31 NOTE — PHARMACY - DISCHARGE MEDICATION RECONCILIATION
Pharmacy:  Discharge Counseling and Medication Reconciliation    David Connollytz  [address] [place] [address]  [phone] (home)   69 year old male  PCP: Fady Penaloza    Allergies: Patient has no known allergies.    Discharge Counseling:    Madelin met with patient at time of discharge. Patient had no changes in medication regimen and had no questions.     Discharge Medication Reconciliation:    Sharri Blakely RPH has reviewed the patient's discharge medication orders and has compared them to the inpatient medication administration record and to what the patient was taking prior to admission - any discrepancies have been resolved.    Thank you for the consult.    Sharri Blakely RPH........July 31, 2019 2:54 PM

## 2019-07-31 NOTE — PROGRESS NOTES
Spoke with MD. Orders to clamp menard bag and discontinue CBI. Perform post void trial and report PVR to Dr. Liu. Will continue to monitor.     Keegan Tarango RN 07/31/19 8:07 AM

## 2019-08-01 ENCOUNTER — TELEPHONE (OUTPATIENT)
Dept: UROLOGY | Facility: OTHER | Age: 69
End: 2019-08-01

## 2019-08-01 NOTE — TELEPHONE ENCOUNTER
Should this question be directed towards his PCP?  To Art Liu MD to address.  Le Scott RN......August 1, 2019...2:09 PM

## 2019-08-01 NOTE — TELEPHONE ENCOUNTER
"Per Art Liu MD: \"Not a concern.   Doesn't need to ask his PCP.   It will resolve with him getting back to his routine of walking, etc.   If it was a significant amount and only ONE leg involved then the concern is a DVT in the calf and he would want to see his PCP.\"  Patient notified of this and no further questions.  Le Scott RN......August 1, 2019...2:38 PM   "

## 2019-08-07 ENCOUNTER — OFFICE VISIT (OUTPATIENT)
Dept: UROLOGY | Facility: OTHER | Age: 69
End: 2019-08-07
Attending: UROLOGY
Payer: COMMERCIAL

## 2019-08-07 VITALS
HEART RATE: 64 BPM | RESPIRATION RATE: 12 BRPM | BODY MASS INDEX: 26.68 KG/M2 | SYSTOLIC BLOOD PRESSURE: 132 MMHG | WEIGHT: 219.2 LBS | DIASTOLIC BLOOD PRESSURE: 86 MMHG

## 2019-08-07 DIAGNOSIS — N40.1 URINARY RETENTION DUE TO BENIGN PROSTATIC HYPERPLASIA: ICD-10-CM

## 2019-08-07 DIAGNOSIS — N13.8 BPH WITH URINARY OBSTRUCTION: Primary | ICD-10-CM

## 2019-08-07 DIAGNOSIS — R33.8 URINARY RETENTION DUE TO BENIGN PROSTATIC HYPERPLASIA: ICD-10-CM

## 2019-08-07 DIAGNOSIS — N40.1 BPH WITH URINARY OBSTRUCTION: Primary | ICD-10-CM

## 2019-08-07 PROCEDURE — 99024 POSTOP FOLLOW-UP VISIT: CPT | Performed by: UROLOGY

## 2019-08-07 PROCEDURE — G0463 HOSPITAL OUTPT CLINIC VISIT: HCPCS | Mod: 25

## 2019-08-07 PROCEDURE — 51798 US URINE CAPACITY MEASURE: CPT | Performed by: UROLOGY

## 2019-08-07 ASSESSMENT — PAIN SCALES - GENERAL: PAINLEVEL: NO PAIN (0)

## 2019-08-07 NOTE — PROGRESS NOTES
Type of Visit  Established post-op    HPI  S/p TURP 1 week ago.  He was catheter dependent prior to the surgery and since the surgery he has been voiding on his own.  Urine is clear.    Nursing Notes:   Le Scott RN  8/7/2019  8:36 AM  Signed  Review of Systems:    Weight loss:    No     Recent fever/chills:  No   Night sweats:   No  Current skin rash:  No   Recent hair loss:  No  Heat intolerance:  No   Cold intolerance:  No  Chest pain:   No   Palpitations:   No  Shortness of breath:  No   Wheezing:   No  Constipation:    No   Diarrhea:   No   Nausea:   No   Vomiting:   No   Kidney/side pain:  No   Back pain:   No  Frequent headaches:  No   Dizziness:     No  Leg swelling:   No   Calf pain:    No    Le Scott RN  8/7/2019  8:41 AM  Signed  Post-Void Residual  A post-void residual was measured by ultrasonic bladder scanner.  17 mL    Assessment  BPH with obstruction: The patient is now catheter free    Plan  Follow up in 4 months with PVR and PSA.

## 2019-08-12 ENCOUNTER — MYC MEDICAL ADVICE (OUTPATIENT)
Dept: UROLOGY | Facility: OTHER | Age: 69
End: 2019-08-12

## 2019-08-13 ENCOUNTER — OFFICE VISIT (OUTPATIENT)
Dept: FAMILY MEDICINE | Facility: OTHER | Age: 69
End: 2019-08-13
Attending: NURSE PRACTITIONER
Payer: COMMERCIAL

## 2019-08-13 VITALS
OXYGEN SATURATION: 99 % | SYSTOLIC BLOOD PRESSURE: 130 MMHG | TEMPERATURE: 96.3 F | DIASTOLIC BLOOD PRESSURE: 84 MMHG | HEIGHT: 77 IN | RESPIRATION RATE: 12 BRPM | HEART RATE: 55 BPM | WEIGHT: 220 LBS | BODY MASS INDEX: 25.98 KG/M2

## 2019-08-13 DIAGNOSIS — R39.89 URINARY PROBLEM: ICD-10-CM

## 2019-08-13 DIAGNOSIS — R31.29 MICROSCOPIC HEMATURIA: ICD-10-CM

## 2019-08-13 DIAGNOSIS — R30.0 DYSURIA: Primary | ICD-10-CM

## 2019-08-13 LAB
ALBUMIN UR-MCNC: ABNORMAL MG/DL
APPEARANCE UR: CLEAR
BILIRUB UR QL STRIP: NEGATIVE
COLOR UR AUTO: YELLOW
GLUCOSE UR STRIP-MCNC: NEGATIVE MG/DL
HGB UR QL STRIP: ABNORMAL
KETONES UR STRIP-MCNC: NEGATIVE MG/DL
LEUKOCYTE ESTERASE UR QL STRIP: ABNORMAL
NITRATE UR QL: NEGATIVE
NON-SQ EPI CELLS #/AREA URNS LPF: ABNORMAL /LPF
PH UR STRIP: 5.5 PH (ref 5–9)
RBC #/AREA URNS AUTO: ABNORMAL /HPF
SOURCE: ABNORMAL
SP GR UR STRIP: 1.01 (ref 1–1.03)
UROBILINOGEN UR STRIP-ACNC: 0.2 EU/DL (ref 0.2–1)
WBC #/AREA URNS AUTO: ABNORMAL /HPF

## 2019-08-13 PROCEDURE — 81001 URINALYSIS AUTO W/SCOPE: CPT | Mod: ZL | Performed by: NURSE PRACTITIONER

## 2019-08-13 PROCEDURE — G0463 HOSPITAL OUTPT CLINIC VISIT: HCPCS

## 2019-08-13 PROCEDURE — 99214 OFFICE O/P EST MOD 30 MIN: CPT | Performed by: NURSE PRACTITIONER

## 2019-08-13 PROCEDURE — 87086 URINE CULTURE/COLONY COUNT: CPT | Mod: ZL | Performed by: NURSE PRACTITIONER

## 2019-08-13 ASSESSMENT — PAIN SCALES - GENERAL: PAINLEVEL: NO PAIN (0)

## 2019-08-13 ASSESSMENT — ENCOUNTER SYMPTOMS
HEMATURIA: 0
NEUROLOGICAL NEGATIVE: 1
GASTROINTESTINAL NEGATIVE: 1
FREQUENCY: 0
CHILLS: 0
PSYCHIATRIC NEGATIVE: 1
DYSURIA: 1
MUSCULOSKELETAL NEGATIVE: 1
FEVER: 0

## 2019-08-13 ASSESSMENT — MIFFLIN-ST. JEOR: SCORE: 1870.29

## 2019-08-13 NOTE — NURSING NOTE
"Chief Complaint   Patient presents with     Urinary Problem     Patient is here for burning while urinating that started late last week. Patient did have a cystoscopy with TUR done last week with his follow up on 8/7. Patient states the follow up went well and he had no concerns at the time. Patient spoke with one of Dr. Liu's nurses who advised him to come in and have his urine checked for a UTI. Patient denies any fevers and has slight discomfort in his lower abdomen that he feels is related to the burning. Patient states that he has been pushing fluids per Dr. Liu but it is not helping.       Initial /84   Pulse 55   Temp 96.3  F (35.7  C) (Tympanic)   Resp 12   Ht 1.956 m (6' 5\")   Wt 99.8 kg (220 lb)   SpO2 99%   BMI 26.09 kg/m   Estimated body mass index is 26.09 kg/m  as calculated from the following:    Height as of this encounter: 1.956 m (6' 5\").    Weight as of this encounter: 99.8 kg (220 lb).  Medication Reconciliation: complete    Kathleen Joe, ALEJANDRO  "

## 2019-08-13 NOTE — PATIENT INSTRUCTIONS
Currently the urinalysis is inconclusive. We have ordered a urine culture to determine if it is a bacterial cause for your pain. This takes 24 to 48 hours to result.  In the meantime you should drink extra fluids and monitor symptoms. Our staff will call you when the culture results and we will treat with antibiotics if needed. Dr. Liu will also have access to our visit and the lab results.  Follow-up with his office at the end of the week if symptoms are not improving, sooner if symptoms worsen.

## 2019-08-13 NOTE — PROGRESS NOTES
SUBJECTIVE:   David Samaniego is a 69 year old male who presents to clinic today for the following health issues:    HPI  History of BPH with obstruction, had a TUR on 7/30/19. Was feeling fine the week following, then this past weekend developed a burning sensation with urination. Has been progressively worse. Some low belly pain, no back pain. No blood in his urine. No difficulty urinating.   Eating and drinking well, no fever, no chills, no fatigue.  Sent a message to Dr. Liu this morning, was advised to present to [facility] for a urinalysis.    Patient Active Problem List    Diagnosis Date Noted     BPH with urinary obstruction 07/30/2019     Priority: Medium     Esophageal reflux 02/22/2018     Priority: Medium     Hypothyroidism 02/22/2018     Priority: Medium     Disorder resulting from impaired renal function 02/22/2018     Priority: Medium     Benign prostatic hyperplasia with weak urinary stream 01/09/2017     Priority: Medium     Erectile dysfunction 01/09/2017     Priority: Medium     H/O adenomatous polyp of colon 10/26/2015     Priority: Medium     Health care maintenance 10/21/2015     Priority: Medium     Lumbar disc herniation 12/26/2013     Priority: Medium     Elevated prostate specific antigen (PSA) 03/12/2013     Priority: Medium     Skin lesion 04/23/2012     Priority: Medium     Nodular prostate with urinary obstruction 04/17/2012     Priority: Medium     Backache 02/29/2012     Priority: Medium     Essential hypertension 09/12/2011     Priority: Medium     Disorder of skin or subcutaneous tissue 09/12/2011     Priority: Medium     Disturbance of skin sensation 03/14/2011     Priority: Medium     Chronic kidney disease, stage III (moderate) (H) 01/11/2011     Priority: Medium     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     No Comments Provided      Enlarged prostate without lower urinary tract symptoms (luts)     No Comments Provided     Essential (primary) hypertension     No Comments Provided     Gastro-esophageal reflux disease without esophagitis     No Comments Provided     Hypothyroidism     No Comments Provided     Other specified congenital malformations of intestine     No Comments Provided     PONV (postoperative nausea and vomiting)     with surgeries in the 80's      Past Surgical History:   Procedure Laterality Date     BACK SURGERY      1986 and 1987     C TUR PROSTATE BIOPSIES       COLONOSCOPY      7/29/05,next due 2015     COLONOSCOPY  10/26/2015    Tubular adenoma,F/U 2020     CYSTOSCOPY, TRANSURETHRAL RESECTION (TUR) PROSTATE, COMBINED N/A 7/30/2019    Procedure: CYSTOSCOPY, WITH TRANSURETHRAL RESECTION (TUR) PROSTATE;  Surgeon: Art Liu MD;  Location: [facility] OR     OTHER SURGICAL HISTORY      [id number],PARTIAL THYROIDECTOMY     OTHER SURGICAL HISTORY      [id number],ANESTHESIA ALERT,Notes no prior complications from anesthesia.:     Family History   Problem Relation Age of Onset     Breast Cancer Mother         Cancer-breast,and progressed to brain cancer     Other - See Comments Mother         Stroke     Other - See Comments Father         Parkinson /dementia     Heart Disease No family hx of         Heart Disease     Diabetes No family hx of         Diabetes     Social History     Tobacco Use     Smoking status: Never Smoker     Smokeless tobacco: Never Used   Substance Use Topics     Alcohol use: Yes     Alcohol/week: 0.0 oz     Comment: Alcoholic Drinks/day: occasionally     Social History     Social History Narrative    Alcohol Use - yes        Works out 3 days per week.  Retired.    Preload 03/08/13     Current Outpatient Medications   Medication Sig Dispense Refill     acyclovir (ZOVIRAX) 400 MG tablet Take 1 tablet (400 mg) by mouth daily 90 tablet 3     aspirin 81 MG EC tablet Take 81 mg by mouth daily       cholecalciferol  "(VITAMIN D3) 5000 units TABS tablet Take by mouth daily       hydrochlorothiazide (HYDRODIURIL) 25 MG tablet Take 1 tablet (25 mg) by mouth daily 90 tablet 3     levothyroxine (SYNTHROID/LEVOTHROID) 150 MCG tablet Take 1 tablet (150 mcg) by mouth daily (with breakfast) 90 tablet 3     omeprazole (PRILOSEC) 20 MG DR capsule Take 1 capsule (20 mg) by mouth daily (Patient taking differently: Take 20 mg by mouth daily Currently taking every other day) 90 capsule 3     No Known Allergies    Review of Systems   Constitutional: Negative for chills and fever.   Gastrointestinal: Negative.    Genitourinary: Positive for dysuria and urgency. Negative for frequency and hematuria.   Musculoskeletal: Negative.    Skin: Negative.    Neurological: Negative.    Psychiatric/Behavioral: Negative.         OBJECTIVE:     /84   Pulse 55   Temp 96.3  F (35.7  C) (Tympanic)   Resp 12   Ht 1.956 m (6' 5\")   Wt 99.8 kg (220 lb)   SpO2 99%   BMI 26.09 kg/m    Body mass index is 26.09 kg/m .  Physical Exam   Constitutional: He is oriented to person, place, and time. He appears well-developed and well-nourished. No distress.   HENT:   Head: Normocephalic.   Right Ear: Tympanic membrane and external ear normal.   Left Ear: Tympanic membrane and external ear normal.   Nose: Nose normal.   Mouth/Throat: Uvula is midline, oropharynx is clear and moist and mucous membranes are normal.   Eyes: Conjunctivae and lids are normal. No scleral icterus.   Neck: Normal range of motion.   Cardiovascular: Normal rate, regular rhythm and normal heart sounds.   No murmur heard.  Pulmonary/Chest: Effort normal and breath sounds normal.   Abdominal: Soft. Bowel sounds are normal. He exhibits no distension. There is no tenderness. There is no guarding.   Musculoskeletal: Normal range of motion.   Neurological: He is alert and oriented to person, place, and time.   Skin: Skin is warm and dry. No rash noted. He is not diaphoretic.   Psychiatric: He has " a normal mood and affect. His behavior is normal.   Nursing note and vitals reviewed.      Diagnostic Test Results:  Results for orders placed or performed in visit on 08/13/19 (from the past 24 hour(s))   Urinalysis w Reflex Microscopic If Positive   Result Value Ref Range    Color Urine Yellow     Appearance Urine Clear     Glucose Urine Negative NEG^Negative mg/dL    Bilirubin Urine Negative NEG^Negative    Ketones Urine Negative NEG^Negative mg/dL    Specific Gravity Urine 1.015 1.000 - 1.030    Blood Urine Large (A) NEG^Negative    pH Urine 5.5 5.0 - 9.0 pH    Protein Albumin Urine Trace (A) NEG^Negative mg/dL    Urobilinogen Urine 0.2 0.2 - 1.0 EU/dL    Nitrite Urine Negative NEG^Negative    Leukocyte Esterase Urine Trace (A) NEG^Negative    Source Midstream Urine    Urine Microscopic   Result Value Ref Range    WBC Urine 0 - 5 OTO5^0 - 5 /HPF    RBC Urine 25-50 (A) OTO2^O - 2 /HPF    Squamous Epithelial /LPF Urine Few FEW^Few /LPF       ASSESSMENT/PLAN:       ICD-10-CM    1. Dysuria R30.0    2. Microscopic hematuria R31.29    3. Urinary problem R39.89 Urinalysis w Reflex Microscopic If Positive     Urine Microscopic     Urine Culture Aerobic Bacterial     PLAN:  UA is not definitive.  Urine culture is added.  Advised patient to push fluids and monitor.  Will notify of culture shows any bacterial cause and treat as needed.  Call for follow-up appointment in 3 to 5 days to see Dr. Liu if symptoms are not resolving, sooner if symptoms worsen.  I explained my diagnostic considerations and recommendations to patient who voiced understanding and agreement with the treatment plan. All questions were answered. We discussed potential side effects of any prescribed or recommended therapies, as well as expectations for response to treatments.    Disclaimer:  This note consists of words and symbols derived from keyboarding, dictation, or using voice recognition software. As a result, there may be errors in the script  that have gone undetected. Please consider this when interpreting information found in this note.      CODI Lowery, NP-C  8/13/2019 at 11:20 AM  [facility]

## 2019-08-15 LAB
BACTERIA SPEC CULT: NO GROWTH
SPECIMEN SOURCE: NORMAL

## 2019-08-16 ENCOUNTER — TELEPHONE (OUTPATIENT)
Dept: FAMILY MEDICINE | Facility: OTHER | Age: 69
End: 2019-08-16

## 2019-08-16 NOTE — TELEPHONE ENCOUNTER
Spoke to patient and gave results. Patient verbalized understanding and states he will contact Dr. Liu.   Kathleen Joe LPN on 8/16/2019 at 11:49 AM

## 2019-08-16 NOTE — TELEPHONE ENCOUNTER
Patient called looking for results of urine culture. Please advise.    Catrina Ty on 8/16/2019 at 10:28 AM

## 2019-08-16 NOTE — TELEPHONE ENCOUNTER
Please call patient.  Urine culture negative for infection.  Adore Barboza NP on 8/16/2019 at 10:47 AM

## 2019-08-19 ENCOUNTER — MYC MEDICAL ADVICE (OUTPATIENT)
Dept: UROLOGY | Facility: OTHER | Age: 69
End: 2019-08-19

## 2019-12-12 ENCOUNTER — OFFICE VISIT (OUTPATIENT)
Dept: UROLOGY | Facility: OTHER | Age: 69
End: 2019-12-12
Attending: UROLOGY
Payer: COMMERCIAL

## 2019-12-12 VITALS
HEART RATE: 54 BPM | BODY MASS INDEX: 26.52 KG/M2 | SYSTOLIC BLOOD PRESSURE: 138 MMHG | WEIGHT: 223.6 LBS | RESPIRATION RATE: 12 BRPM | DIASTOLIC BLOOD PRESSURE: 80 MMHG

## 2019-12-12 DIAGNOSIS — R97.20 ELEVATED PROSTATE SPECIFIC ANTIGEN (PSA): ICD-10-CM

## 2019-12-12 DIAGNOSIS — R33.8 URINARY RETENTION DUE TO BENIGN PROSTATIC HYPERPLASIA: Primary | ICD-10-CM

## 2019-12-12 DIAGNOSIS — N40.1 URINARY RETENTION DUE TO BENIGN PROSTATIC HYPERPLASIA: Primary | ICD-10-CM

## 2019-12-12 LAB — PSA SERPL-MCNC: 6.53 NG/ML

## 2019-12-12 PROCEDURE — 36415 COLL VENOUS BLD VENIPUNCTURE: CPT | Mod: ZL | Performed by: UROLOGY

## 2019-12-12 PROCEDURE — 51798 US URINE CAPACITY MEASURE: CPT | Performed by: UROLOGY

## 2019-12-12 PROCEDURE — 84153 ASSAY OF PSA TOTAL: CPT | Mod: ZL | Performed by: UROLOGY

## 2019-12-12 PROCEDURE — G0463 HOSPITAL OUTPT CLINIC VISIT: HCPCS | Mod: 25

## 2019-12-12 PROCEDURE — 99213 OFFICE O/P EST LOW 20 MIN: CPT | Performed by: UROLOGY

## 2019-12-12 ASSESSMENT — PAIN SCALES - GENERAL: PAINLEVEL: NO PAIN (0)

## 2019-12-12 NOTE — PROGRESS NOTES
Type of Visit  Established    Chief Complaint  BPH with retention    HPI  Mr. Samaniego is a 69 year old male follows up 4 months s/p TURP.  Patient was catheter dependent prior to surgery.  Following the TURP he became catheter free.  Today he states he is voiding well and has no complaints.  He denies gross hematuria and dysuria.  He denies a sense of incomplete emptying.    He also has a history of elevated PSAs and underwent PSA check earlier today.      Review of Systems  I reviewed the ROS with the patient.    Nursing Notes:   Ntaalie Villavicencio LPN  12/12/2019 10:19 AM  Sign at exiting of workspace  Pt presents to clinic for a 4 month TURP follow up    Review of Systems:    Weight loss:    No     Recent fever/chills:  No   Night sweats:   No  Current skin rash:  No   Recent hair loss:  No  Heat intolerance:  No   Cold intolerance:  No  Chest pain:   No   Palpitations:   No  Shortness of breath:  No   Wheezing:   No  Constipation:    No   Diarrhea:   No   Nausea:   No   Vomiting:   No   Kidney/side pain:  No   Back pain:   No  Frequent headaches:  No   Dizziness:     No  Leg swelling:   No   Calf pain:    No      Post-Void Residual  A post-void residual was measured by ultrasonic bladder scanner.  40 mL      Family History  Family History   Problem Relation Age of Onset     Breast Cancer Mother         Cancer-breast,and progressed to brain cancer     Other - See Comments Mother         Stroke     Other - See Comments Father         Parkinson /dementia     Heart Disease No family hx of         Heart Disease     Diabetes No family hx of         Diabetes       Physical Exam  Vitals:    12/12/19 1019   BP: 138/80   BP Location: Right arm   Patient Position: Sitting   Cuff Size: Adult Regular   Pulse: 54   Resp: 12   Weight: 101.4 kg (223 lb 9.6 oz)   Constitutional: NAD, WDWN.  Cardiovascular: Regular rate.  Pulmonary/Chest: Respirations are even and non-labored bilaterally.  Abdominal: Soft. No distension,  tenderness, masses or guarding. No CVA tenderness.  Extremities: JUANI x 4, Warm. No clubbing.  No cyanosis.    Skin: Pink, warm and dry.  No rashes noted.  Genitourinary: nonpalpable bladder    Labs  Results for JAZMINE GOMEZ (MRN [id number]) as of 12/12/2019 10:21   1/23/2019 12:21 12/12/2019 07:57   Prostate Specific Antigen 6.718 (H) 6.534 (H)     Results for JAZMINE GOMEZ (MRN [id number]) as of 12/12/2019 10:21   7/29/2014 10:05 1/13/2015 12:08 1/13/2016 10:42 1/3/2017 10:04 1/24/2018 09:33   PSA Total  5.020 (H) 4.440 (H) 5.843 (H) 4.527 (H) 5.505 (H)     Results for JAZMINE GOMEZ (MRN [id number]) as of 12/12/2019 10:21   3/12/2013 08:54   PSA Total 3.75     Results for JAZMINE GOMEZ (MRN [id number]) as of 12/12/2019 10:26   12/26/2013 10:23 1/13/2015 11:46 1/13/2016 10:29 1/3/2017 09:47 1/24/2018 10:59 1/23/2019 12:21 7/25/2019 14:49   Creatinine 1.66 (H) 1.52 (H) 1.51 (H) 1.41 (H) 1.78 (H) 1.48 (H) 1.67 (H)       Post-Void Residual  A post-void residual was measured by ultrasonic bladder scanner.  40 mL today  17 mL post-op  Catheter dependent prior to TURP    Assessment  Mr. Gomez is a 69 year old male follows up with BPH leading to retention s/p TURP 4 months ago who is now catheter free and doing well.  PSA is stable.    Plan  Follow-up in 1 year (after Jan 2021 - PCP annual visit) with PVR

## 2019-12-12 NOTE — NURSING NOTE
Pt presents to clinic for a 4 month TURP follow up    Review of Systems:    Weight loss:    No     Recent fever/chills:  No   Night sweats:   No  Current skin rash:  No   Recent hair loss:  No  Heat intolerance:  No   Cold intolerance:  No  Chest pain:   No   Palpitations:   No  Shortness of breath:  No   Wheezing:   No  Constipation:    No   Diarrhea:   No   Nausea:   No   Vomiting:   No   Kidney/side pain:  No   Back pain:   No  Frequent headaches:  No   Dizziness:     No  Leg swelling:   No   Calf pain:    No      Post-Void Residual  A post-void residual was measured by ultrasonic bladder scanner.  40 mL

## 2020-01-23 ENCOUNTER — OFFICE VISIT (OUTPATIENT)
Dept: FAMILY MEDICINE | Facility: OTHER | Age: 70
End: 2020-01-23
Attending: FAMILY MEDICINE
Payer: COMMERCIAL

## 2020-01-23 VITALS
RESPIRATION RATE: 16 BRPM | BODY MASS INDEX: 26.24 KG/M2 | SYSTOLIC BLOOD PRESSURE: 136 MMHG | HEIGHT: 77 IN | DIASTOLIC BLOOD PRESSURE: 76 MMHG | TEMPERATURE: 98.6 F | HEART RATE: 53 BPM | OXYGEN SATURATION: 98 % | WEIGHT: 222.2 LBS

## 2020-01-23 DIAGNOSIS — I10 ESSENTIAL HYPERTENSION: ICD-10-CM

## 2020-01-23 DIAGNOSIS — E03.9 HYPOTHYROIDISM, UNSPECIFIED TYPE: ICD-10-CM

## 2020-01-23 DIAGNOSIS — Z00.00 ENCOUNTER FOR MEDICARE ANNUAL WELLNESS EXAM: Primary | ICD-10-CM

## 2020-01-23 DIAGNOSIS — R20.9 DISTURBANCE OF SKIN SENSATION: ICD-10-CM

## 2020-01-23 DIAGNOSIS — Z12.5 SCREENING FOR PROSTATE CANCER: ICD-10-CM

## 2020-01-23 DIAGNOSIS — Z13.220 LIPID SCREENING: ICD-10-CM

## 2020-01-23 DIAGNOSIS — K21.9 GASTROESOPHAGEAL REFLUX DISEASE WITHOUT ESOPHAGITIS: ICD-10-CM

## 2020-01-23 DIAGNOSIS — D12.6 TUBULAR ADENOMA OF COLON: ICD-10-CM

## 2020-01-23 LAB
ANION GAP SERPL CALCULATED.3IONS-SCNC: 7 MMOL/L (ref 3–14)
BUN SERPL-MCNC: 27 MG/DL (ref 7–25)
CALCIUM SERPL-MCNC: 10.5 MG/DL (ref 8.6–10.3)
CHLORIDE SERPL-SCNC: 106 MMOL/L (ref 98–107)
CHOLEST SERPL-MCNC: 186 MG/DL
CO2 SERPL-SCNC: 26 MMOL/L (ref 21–31)
CREAT SERPL-MCNC: 1.69 MG/DL (ref 0.7–1.3)
GFR SERPL CREATININE-BSD FRML MDRD: 40 ML/MIN/{1.73_M2}
GLUCOSE SERPL-MCNC: 112 MG/DL (ref 70–105)
HDLC SERPL-MCNC: 53 MG/DL (ref 23–92)
LDLC SERPL CALC-MCNC: 121 MG/DL
NONHDLC SERPL-MCNC: 133 MG/DL
POTASSIUM SERPL-SCNC: 4.2 MMOL/L (ref 3.5–5.1)
PSA SERPL-ACNC: 7.97 NG/ML
SODIUM SERPL-SCNC: 139 MMOL/L (ref 134–144)
TRIGL SERPL-MCNC: 62 MG/DL
TSH SERPL DL<=0.05 MIU/L-ACNC: 1.22 IU/ML (ref 0.34–5.6)

## 2020-01-23 PROCEDURE — 80061 LIPID PANEL: CPT | Mod: ZL | Performed by: FAMILY MEDICINE

## 2020-01-23 PROCEDURE — G0103 PSA SCREENING: HCPCS | Mod: ZL | Performed by: FAMILY MEDICINE

## 2020-01-23 PROCEDURE — G0463 HOSPITAL OUTPT CLINIC VISIT: HCPCS

## 2020-01-23 PROCEDURE — 36415 COLL VENOUS BLD VENIPUNCTURE: CPT | Mod: ZL | Performed by: FAMILY MEDICINE

## 2020-01-23 PROCEDURE — G0439 PPPS, SUBSEQ VISIT: HCPCS | Performed by: FAMILY MEDICINE

## 2020-01-23 PROCEDURE — 84443 ASSAY THYROID STIM HORMONE: CPT | Mod: ZL | Performed by: FAMILY MEDICINE

## 2020-01-23 PROCEDURE — 80048 BASIC METABOLIC PNL TOTAL CA: CPT | Mod: ZL | Performed by: FAMILY MEDICINE

## 2020-01-23 RX ORDER — LEVOTHYROXINE SODIUM 150 UG/1
150 TABLET ORAL
Qty: 90 TABLET | Refills: 3 | Status: SHIPPED | OUTPATIENT
Start: 2020-01-23 | End: 2021-01-25

## 2020-01-23 RX ORDER — HYDROCHLOROTHIAZIDE 25 MG/1
25 TABLET ORAL DAILY
Qty: 90 TABLET | Refills: 3 | Status: SHIPPED | OUTPATIENT
Start: 2020-01-23 | End: 2021-01-25

## 2020-01-23 RX ORDER — ACYCLOVIR 400 MG/1
400 TABLET ORAL DAILY
Qty: 90 TABLET | Refills: 3 | Status: SHIPPED | OUTPATIENT
Start: 2020-01-23 | End: 2021-01-25

## 2020-01-23 ASSESSMENT — ANXIETY QUESTIONNAIRES
2. NOT BEING ABLE TO STOP OR CONTROL WORRYING: NOT AT ALL
IF YOU CHECKED OFF ANY PROBLEMS ON THIS QUESTIONNAIRE, HOW DIFFICULT HAVE THESE PROBLEMS MADE IT FOR YOU TO DO YOUR WORK, TAKE CARE OF THINGS AT HOME, OR GET ALONG WITH OTHER PEOPLE: NOT DIFFICULT AT ALL
7. FEELING AFRAID AS IF SOMETHING AWFUL MIGHT HAPPEN: NOT AT ALL
5. BEING SO RESTLESS THAT IT IS HARD TO SIT STILL: NOT AT ALL
6. BECOMING EASILY ANNOYED OR IRRITABLE: NOT AT ALL
GAD7 TOTAL SCORE: 0
1. FEELING NERVOUS, ANXIOUS, OR ON EDGE: NOT AT ALL
3. WORRYING TOO MUCH ABOUT DIFFERENT THINGS: NOT AT ALL

## 2020-01-23 ASSESSMENT — MIFFLIN-ST. JEOR: SCORE: 1880.27

## 2020-01-23 ASSESSMENT — PATIENT HEALTH QUESTIONNAIRE - PHQ9: 5. POOR APPETITE OR OVEREATING: NOT AT ALL

## 2020-01-23 NOTE — PROGRESS NOTES
"SUBJECTIVE:   David Samaniego is a 69 year old male who presents for Preventive Visit.  click delete button to remove this line now  click delete button to remove this line now  Are you in the first 12 months of your Medicare Part B coverage?  No    Physical Health:    In general, how would you rate your overall physical health? good    Outside of work, how many days during the week do you exercise? 2-3 days/week    Outside of work, approximately how many minutes a day do you exercise?greater than 60 minutes    If you drink alcohol do you typically have >3 drinks per day or >7 drinks per week? No    Do you usually eat at least 4 servings of fruit and vegetables a day, include whole grains & fiber and avoid regularly eating high fat or \"junk\" foods? Yes    Do you have any problems taking medications regularly?  No    Do you have any side effects from medications? not applicable    Needs assistance for the following daily activities: no assistance needed    Which of the following safety concerns are present in your home?  none identified     Hearing impairment: No    In the past 6 months, have you been bothered by leaking of urine? no    Mental Health:    In general, how would you rate your overall mental or emotional health? good  PHQ-2 Score:      Do you feel safe in your environment? No    Have you ever done Advance Care Planning? (For example, a Health Directive, POLST, or a discussion with a medical provider or your loved ones about your wishes): Yes, advance care planning is on file.    Additional concerns to address?  No    Fall risk:  Fallen 2 or more times in the past year?: No  Any fall with injury in the past year?: No  click delete button to remove this line now  Cognitive Screenin) Repeat 3 items (Leader, Season, Table)    2) Clock draw:  NORMAL  3) 3 item recall:  Recalls 3 objects  Results: 3 items recalled: COGNITIVE IMPAIRMENT LESS LIKELY    Mini-CogTM Copyright ORION James. Licensed by the author " for use in [facility]; reprinted with permission ([email or web address]). All rights reserved.      Do you have sleep apnea, excessive snoring or daytime drowsiness?: no        none    Reviewed and updated as needed this visit by clinical staff         Reviewed and updated as needed this visit by Provider        Social History     Tobacco Use     Smoking status: Never Smoker     Smokeless tobacco: Never Used   Substance Use Topics     Alcohol use: Yes     Alcohol/week: 0.0 standard drinks     Comment: Alcoholic Drinks/day: occasionally                           Current providers sharing in care for this patient include:    Patient Care Team:  Fady Penaloza MD as PCP - General (Family Practice)  Fady Penaloza MD as Assigned PCP    The following health maintenance items are reviewed in Epic and correct as of today:  Health Maintenance   Topic Date Due     HEPATITIS C SCREENING  1950     ZOSTER IMMUNIZATION (2 of 3) 03/12/2010     AORTIC ANEURYSM SCREENING (SYSTEM ASSIGNED)  06/28/2013     PHQ-2  01/01/2020     MEDICARE ANNUAL WELLNESS VISIT  01/23/2020     FALL RISK ASSESSMENT  01/23/2020     COLONOSCOPY  10/26/2020     LIPID  01/24/2023     ADVANCE CARE PLANNING  03/08/2024     DTAP/TDAP/TD IMMUNIZATION (2 - Td) 01/13/2025     INFLUENZA VACCINE  Completed     PNEUMOCOCCAL IMMUNIZATION 65+ LOW/MEDIUM RISK  Completed     IPV IMMUNIZATION  Aged Out     MENINGITIS IMMUNIZATION  Aged Out     Lab work is in process  Labs reviewed in [facility]  Current Outpatient Medications   Medication Sig Dispense Refill     acyclovir (ZOVIRAX) 400 MG tablet Take 1 tablet (400 mg) by mouth daily 90 tablet 3     aspirin 81 MG EC tablet Take 81 mg by mouth daily       cholecalciferol (VITAMIN D3) 5000 units TABS tablet Take by mouth daily       hydrochlorothiazide (HYDRODIURIL) 25 MG tablet Take 1 tablet (25 mg) by mouth daily 90 tablet 3     levothyroxine (SYNTHROID/LEVOTHROID) 150 MCG tablet Take 1 tablet (150 mcg) by mouth daily  "(with breakfast) 90 tablet 3     omeprazole (PRILOSEC) 20 MG DR capsule Take 1 capsule (20 mg) by mouth daily 90 capsule 3     No Known Allergies       ROS:  Constitutional, HEENT, cardiovascular, pulmonary, GI, , musculoskeletal, neuro, skin, endocrine and psych systems are negative, except as otherwise noted.    OBJECTIVE:   There were no vitals taken for this visit. Estimated body mass index is 26.52 kg/m  as calculated from the following:    Height as of 8/13/19: 1.956 m (6' 5\").    Weight as of 12/12/19: 101.4 kg (223 lb 9.6 oz).  EXAM:   GENERAL: healthy, alert and no distress  EYES: Eyes grossly normal to inspection, PERRL and conjunctivae and sclerae normal  HENT: ear canals and TM's normal, nose and mouth without ulcers or lesions  NECK: no adenopathy, no asymmetry, masses, or scars and thyroid normal to palpation  RESP: lungs clear to auscultation - no rales, rhonchi or wheezes  CV: regular rate and rhythm, normal S1 S2, no S3 or S4, no murmur, click or rub, no peripheral edema and peripheral pulses strong  ABDOMEN: soft, nontender, no hepatosplenomegaly, no masses and bowel sounds normal  MS: no gross musculoskeletal defects noted, no edema  SKIN: no suspicious lesions or rashes  NEURO: Normal strength and tone, mentation intact and speech normal  PSYCH: mentation appears normal, affect normal/bright    Diagnostic Test Results:  Labs reviewed in Epic  Results for orders placed or performed in visit on 01/23/20   Lipid Panel     Status: Abnormal   Result Value Ref Range    Cholesterol 186 <200 mg/dL    Triglycerides 62 <150 mg/dL    HDL Cholesterol 53 23 - 92 mg/dL    LDL Cholesterol Calculated 121 (H) <100 mg/dL    Non HDL Cholesterol 133 (H) <130 mg/dL   TSH     Status: None   Result Value Ref Range    Thyrotropin 1.22 0.34 - 5.60 IU/mL   Basic Metabolic Panel     Status: Abnormal   Result Value Ref Range    Sodium 139 134 - 144 mmol/L    Potassium 4.2 3.5 - 5.1 mmol/L    Chloride 106 98 - 107 mmol/L " "   Carbon Dioxide 26 21 - 31 mmol/L    Anion Gap 7 3 - 14 mmol/L    Glucose 112 (H) 70 - 105 mg/dL    Urea Nitrogen 27 (H) 7 - 25 mg/dL    Creatinine 1.69 (H) 0.70 - 1.30 mg/dL    GFR Estimate 40 (L) >60 mL/min/[1.73_m2]    GFR Estimate If Black 49 (L) >60 mL/min/[1.73_m2]    Calcium 10.5 (H) 8.6 - 10.3 mg/dL   PSA Screen [facility]     Status: Abnormal   Result Value Ref Range    PSA Screen 7.973 (H) <3.100 ng/mL         ASSESSMENT / PLAN:       ICD-10-CM    1. Encounter for Medicare annual wellness exam Z00.00    2. Disturbance of skin sensation R20.9 acyclovir (ZOVIRAX) 400 MG tablet   3. Essential hypertension I10 hydrochlorothiazide (HYDRODIURIL) 25 MG tablet     Basic Metabolic Panel   4. Hypothyroidism, unspecified type E03.9 levothyroxine (SYNTHROID/LEVOTHROID) 150 MCG tablet     TSH   5. Gastroesophageal reflux disease without esophagitis K21.9 omeprazole (PRILOSEC) 20 MG DR capsule   6. Lipid screening Z13.220 Lipid Panel   7. Screening for prostate cancer Z12.5 PSA Screen [facility]   8. Tubular adenoma of colon D12.6 GASTROENTEROLOGY ADULT REF PROCEDURE ONLY       COUNSELING:  Reviewed preventive health counseling, as reflected in patient instructions       Regular exercise       Healthy diet/nutrition       Colon cancer screening       Prostate cancer screening    Estimated body mass index is 26.52 kg/m  as calculated from the following:    Height as of 8/13/19: 1.956 m (6' 5\").    Weight as of 12/12/19: 101.4 kg (223 lb 9.6 oz).         reports that he has never smoked. He has never used smokeless tobacco.      Appropriate preventive services were discussed with this patient, including applicable screening as appropriate for cardiovascular disease, diabetes, osteopenia/osteoporosis, and glaucoma.  As appropriate for age/gender, discussed screening for colorectal cancer, prostate cancer, breast cancer, and cervical cancer. Checklist reviewing preventive services available has been given to the patient.    Reviewed " patients plan of care and provided an AVS. The Basic Care Plan (routine screening as documented in Health Maintenance) for David meets the Care Plan requirement. This Care Plan has been established and reviewed with the Patient.    Counseling Resources:  ATP IV Guidelines  Pooled Cohorts Equation Calculator  Breast Cancer Risk Calculator  FRAX Risk Assessment  ICSI Preventive Guidelines  Dietary Guidelines for Americans, 2010  USDA's MyPlate  ASA Prophylaxis  Lung CA Screening    Fady Penaloza MD  [facility]

## 2020-01-23 NOTE — NURSING NOTE
"Coming in for a physical check up, is fasting     Chief Complaint   Patient presents with     Physical     is fasting       Initial /76   Pulse 53   Temp 98.6  F (37  C) (Tympanic)   Resp 16   Ht 1.956 m (6' 5\")   Wt 100.8 kg (222 lb 3.2 oz)   SpO2 98%   BMI 26.35 kg/m   Estimated body mass index is 26.35 kg/m  as calculated from the following:    Height as of this encounter: 1.956 m (6' 5\").    Weight as of this encounter: 100.8 kg (222 lb 3.2 oz).  Medication Reconciliation: complete    Monica Sullivan LPN     Visual Acuity Screening - Snellen Chart   Visualacuity OD (right eye): 20/ 25   Visual acuity OS (left eye): 20/ 32   Visual acuity OU (both eyes): 20/ 25   Corrective lenses worn: yes             "

## 2020-01-23 NOTE — PATIENT INSTRUCTIONS
Patient Education   Personalized Prevention Plan  You are due for the preventive services outlined below.  Your care team is available to assist you in scheduling these services.  If you have already completed any of these items, please share that information with your care team to update in your medical record.  Health Maintenance Due   Topic Date Due     Hepatitis C Screening  1950     Zoster (Shingles) Vaccine (2 of 3) 03/12/2010     AORTIC ANEURYSM SCREENING (SYSTEM ASSIGNED)  06/28/2013     PHQ-2  01/01/2020     Annual Wellness Visit  01/23/2020     FALL RISK ASSESSMENT  01/23/2020

## 2020-01-24 ASSESSMENT — ANXIETY QUESTIONNAIRES: GAD7 TOTAL SCORE: 0

## 2020-10-14 DIAGNOSIS — Z86.0101 H/O ADENOMATOUS POLYP OF COLON: Primary | ICD-10-CM

## 2020-10-14 RX ORDER — BISACODYL 5 MG
TABLET, DELAYED RELEASE (ENTERIC COATED) ORAL
Qty: 2 TABLET | Refills: 0 | Status: ON HOLD | OUTPATIENT
Start: 2020-10-14 | End: 2020-11-16

## 2020-10-14 RX ORDER — POLYETHYLENE GLYCOL 3350, SODIUM CHLORIDE, SODIUM BICARBONATE, POTASSIUM CHLORIDE 420; 11.2; 5.72; 1.48 G/4L; G/4L; G/4L; G/4L
4000 POWDER, FOR SOLUTION ORAL ONCE
Qty: 4000 ML | Refills: 0 | Status: SHIPPED | OUTPATIENT
Start: 2020-10-14 | End: 2020-10-14

## 2020-10-14 NOTE — TELEPHONE ENCOUNTER
Screening Questions for the Scheduling of Screening Colonoscopies   (If Colonoscopy is diagnostic, Provider should review the chart before scheduling.)  Are you younger than 50 or older than 80?   NO   Do you take aspirin or fish oil?  YES - ASPIRIN  (if yes, tell patient to stop 1 week prior to Colonoscopy)  Do you take warfarin (Coumadin), clopidogrel (Plavix), apixaban (Eliquis), dabigatram (Pradaxa), rivaroxaban (Xarelto) or any blood thinner? NO   Do you use oxygen at home?  NO   Do you have kidney disease? NO   Are you on dialysis? NO   Have you had a stroke or heart attack in the last year? NO   Have you had a stent in your heart or any blood vessel in the last year? NO   Have you had a transplant of any organ? NO   Have you had a colonoscopy or upper endoscopy (EGD) before? YES          When?  2015  Date of scheduled Colonoscopy. 11/16/2020  Provider DARYA   Ph[facility]

## 2020-11-12 ENCOUNTER — ALLIED HEALTH/NURSE VISIT (OUTPATIENT)
Dept: FAMILY MEDICINE | Facility: OTHER | Age: 70
End: 2020-11-12
Attending: SURGERY
Payer: COMMERCIAL

## 2020-11-12 DIAGNOSIS — Z86.0101 H/O ADENOMATOUS POLYP OF COLON: ICD-10-CM

## 2020-11-12 PROCEDURE — U0003 INFECTIOUS AGENT DETECTION BY NUCLEIC ACID (DNA OR RNA); SEVERE ACUTE RESPIRATORY SYNDROME CORONAVIRUS 2 (SARS-COV-2) (CORONAVIRUS DISEASE [COVID-19]), AMPLIFIED PROBE TECHNIQUE, MAKING USE OF HIGH THROUGHPUT TECHNOLOGIES AS DESCRIBED BY CMS-2020-01-R: HCPCS | Mod: ZL | Performed by: SURGERY

## 2020-11-12 PROCEDURE — 99207 PR NO CHARGE NURSE ONLY: CPT

## 2020-11-12 PROCEDURE — C9803 HOPD COVID-19 SPEC COLLECT: HCPCS

## 2020-11-13 LAB
LABORATORY COMMENT REPORT: NORMAL
SARS-COV-2 RNA SPEC QL NAA+PROBE: NEGATIVE
SARS-COV-2 RNA SPEC QL NAA+PROBE: NORMAL
SPECIMEN SOURCE: NORMAL
SPECIMEN SOURCE: NORMAL

## 2020-11-16 ENCOUNTER — ANESTHESIA EVENT (OUTPATIENT)
Dept: SURGERY | Facility: OTHER | Age: 70
End: 2020-11-16
Payer: COMMERCIAL

## 2020-11-16 ENCOUNTER — HOSPITAL ENCOUNTER (OUTPATIENT)
Facility: OTHER | Age: 70
Discharge: HOME | End: 2020-11-16
Attending: SURGERY | Admitting: SURGERY
Payer: COMMERCIAL

## 2020-11-16 ENCOUNTER — ANESTHESIA (OUTPATIENT)
Dept: SURGERY | Facility: OTHER | Age: 70
End: 2020-11-16
Payer: COMMERCIAL

## 2020-11-16 VITALS
DIASTOLIC BLOOD PRESSURE: 92 MMHG | HEART RATE: 51 BPM | WEIGHT: 222 LBS | SYSTOLIC BLOOD PRESSURE: 189 MMHG | RESPIRATION RATE: 12 BRPM | BODY MASS INDEX: 26.21 KG/M2 | TEMPERATURE: 97.9 F | OXYGEN SATURATION: 98 % | HEIGHT: 77 IN

## 2020-11-16 PROBLEM — K63.5 COLON POLYPS: Status: ACTIVE | Noted: 2020-11-16

## 2020-11-16 PROCEDURE — 45384 COLONOSCOPY W/LESION REMOVAL: CPT | Performed by: NURSE ANESTHETIST, CERTIFIED REGISTERED

## 2020-11-16 PROCEDURE — 45384 COLONOSCOPY W/LESION REMOVAL: CPT | Mod: PT | Performed by: SURGERY

## 2020-11-16 PROCEDURE — 45380 COLONOSCOPY AND BIOPSY: CPT | Performed by: SURGERY

## 2020-11-16 PROCEDURE — 88305 TISSUE EXAM BY PATHOLOGIST: CPT

## 2020-11-16 PROCEDURE — 258N000003 HC RX IP 258 OP 636: Performed by: SURGERY

## 2020-11-16 PROCEDURE — 250N000011 HC RX IP 250 OP 636: Performed by: NURSE ANESTHETIST, CERTIFIED REGISTERED

## 2020-11-16 PROCEDURE — 250N000009 HC RX 250: Performed by: NURSE ANESTHETIST, CERTIFIED REGISTERED

## 2020-11-16 PROCEDURE — 999N000010 HC STATISTIC ANES STAT CODE-CRNA PER MINUTE: Performed by: SURGERY

## 2020-11-16 PROCEDURE — 99100 ANES PT EXTEME AGE<1 YR&>70: CPT | Performed by: NURSE ANESTHETIST, CERTIFIED REGISTERED

## 2020-11-16 PROCEDURE — 250N000009 HC RX 250: Performed by: SURGERY

## 2020-11-16 PROCEDURE — 250N000013 HC RX MED GY IP 250 OP 250 PS 637: Performed by: SURGERY

## 2020-11-16 RX ORDER — SIMETHICONE 40MG/0.6ML
SUSPENSION, DROPS(FINAL DOSAGE FORM)(ML) ORAL PRN
Status: DISCONTINUED | OUTPATIENT
Start: 2020-11-16 | End: 2020-11-16 | Stop reason: HOSPADM

## 2020-11-16 RX ORDER — SODIUM CHLORIDE, SODIUM LACTATE, POTASSIUM CHLORIDE, CALCIUM CHLORIDE 600; 310; 30; 20 MG/100ML; MG/100ML; MG/100ML; MG/100ML
INJECTION, SOLUTION INTRAVENOUS CONTINUOUS
Status: DISCONTINUED | OUTPATIENT
Start: 2020-11-16 | End: 2020-11-16 | Stop reason: HOSPADM

## 2020-11-16 RX ORDER — LIDOCAINE 40 MG/G
CREAM TOPICAL
Status: DISCONTINUED | OUTPATIENT
Start: 2020-11-16 | End: 2020-11-16 | Stop reason: HOSPADM

## 2020-11-16 RX ORDER — NALOXONE HYDROCHLORIDE 0.4 MG/ML
.1-.4 INJECTION, SOLUTION INTRAMUSCULAR; INTRAVENOUS; SUBCUTANEOUS
Status: DISCONTINUED | OUTPATIENT
Start: 2020-11-16 | End: 2020-11-16 | Stop reason: HOSPADM

## 2020-11-16 RX ORDER — PROPOFOL 10 MG/ML
INJECTION, EMULSION INTRAVENOUS CONTINUOUS PRN
Status: DISCONTINUED | OUTPATIENT
Start: 2020-11-16 | End: 2020-11-16

## 2020-11-16 RX ORDER — ONDANSETRON 2 MG/ML
4 INJECTION INTRAMUSCULAR; INTRAVENOUS
Status: DISCONTINUED | OUTPATIENT
Start: 2020-11-16 | End: 2020-11-16 | Stop reason: HOSPADM

## 2020-11-16 RX ORDER — LIDOCAINE HYDROCHLORIDE 20 MG/ML
INJECTION, SOLUTION INFILTRATION; PERINEURAL PRN
Status: DISCONTINUED | OUTPATIENT
Start: 2020-11-16 | End: 2020-11-16

## 2020-11-16 RX ORDER — FLUMAZENIL 0.1 MG/ML
0.2 INJECTION, SOLUTION INTRAVENOUS
Status: DISCONTINUED | OUTPATIENT
Start: 2020-11-16 | End: 2020-11-16 | Stop reason: HOSPADM

## 2020-11-16 RX ORDER — PROPOFOL 10 MG/ML
INJECTION, EMULSION INTRAVENOUS PRN
Status: DISCONTINUED | OUTPATIENT
Start: 2020-11-16 | End: 2020-11-16

## 2020-11-16 RX ADMIN — PROPOFOL 70 MG: 10 INJECTION, EMULSION INTRAVENOUS at 14:26

## 2020-11-16 RX ADMIN — SODIUM CHLORIDE, POTASSIUM CHLORIDE, SODIUM LACTATE AND CALCIUM CHLORIDE 30 ML/HR: 600; 310; 30; 20 INJECTION, SOLUTION INTRAVENOUS at 11:55

## 2020-11-16 RX ADMIN — LIDOCAINE HYDROCHLORIDE 40 MG: 20 INJECTION, SOLUTION INFILTRATION; PERINEURAL at 14:26

## 2020-11-16 RX ADMIN — PROPOFOL 140 MCG/KG/MIN: 10 INJECTION, EMULSION INTRAVENOUS at 14:26

## 2020-11-16 ASSESSMENT — MIFFLIN-ST. JEOR: SCORE: 1874.37

## 2020-11-16 NOTE — DISCHARGE INSTRUCTIONS
[facility]  Adult Discharge Orders & Instructions    ________________________________________________________________          For 12 hours after surgery  1. Get plenty of rest.  A responsible adult must stay with you for at least 12 hours after you leave the hospital.   2. You may feel lightheaded.  IF so, sit for a few minutes before standing.  Have someone help you get up.   3. You may have a slight fever. Call the doctor if your fever is over 101 F (38.3 C) (taken under the tongue) or lasts longer than 24 hours.  4. You may have a dry mouth, a sore throat, muscle aches or trouble sleeping.  These should go away after 24 hours.  5. Do not make important or legal decisions.  6.   Do not drive or use heavy equipment.  If you have weakness or tingling, don't drive or use heavy equipment until this feeling goes away.    To contact a doctor, call   [phone]_______________________

## 2020-11-16 NOTE — ANESTHESIA CARE TRANSFER NOTE
Patient: David Samaniego    Procedure(s):  COLONOSCOPY, WITH POLYPECTOMY AND BIOPSY    Diagnosis: Encounter for screening colonoscopy [Z12.11]  Diagnosis Additional Information: No value filed.    Anesthesia Type:   MAC     Note:  Airway :Nasal Cannula  Patient transferred to:Phase II  Handoff Report: Identifed the Patient, Identified the Reponsible Provider, Reviewed the pertinent medical history, Discussed the surgical course, Reviewed Intra-OP anesthesia mangement and issues during anesthesia, Set expectations for post-procedure period and Allowed opportunity for questions and acknowledgement of understanding      Vitals: (Last set prior to Anesthesia Care Transfer)    CRNA VITALS  11/16/2020 1431 - 11/16/2020 1505      11/16/2020             Resp Rate (set):  10                Electronically Signed By: CODI CAROLINA CRNA  November 16, 2020  3:05 PM

## 2020-11-16 NOTE — OP NOTE
PROCEDURE NOTE    DATE OF SERVICE: 11/16/2020    SURGEON: Eber Rooney MD    PRE-OP DIAGNOSIS:    History of Polyps      POST-OP DIAGNOSIS:  Same  Polyps at cecum and AC    PROCEDURE:   Colonoscopy with hot biopsy      ANESTHESIA:  GEMINI Gallegos CRNA    INDICATION FOR THE PROCEDURE: The patient is a 70 year old male with h/o polyps . The patient has no other complaints  . After explaining the risks to include bleeding, perforation, potential inability toreach the cecum, the patient wished to proceed.    PROCEDURE:After adequate sedation, the patient was in the left lateral decubitus position.  Rectal exam was performed.  There was normal tone and no palpable masses .  The colonoscope was introduced into the rectum and advanced to the cecum with Moderate difficulty required some pressure to reach cecum.  The patient's prep was good.  The terminal cecum was reached.  The cecum, ascending, transverse, descending and sigmoid colon was with diminutive polyps at cecum and AC that were hot biopsied and destroyed .  The scope was retroflexed in the rectum.  The rectum was unremarkable  .  The scope was straightened and removed.  The patient tolerated the procedure well.     ESTIMATED BLOOD LOSS: none    COMPLICATIONS:  None    TISSUE REMOVED:  Yes    RECOMMEND:      Follow-up pending pathology      Eber Rooney MD FACS

## 2020-11-16 NOTE — ANESTHESIA POSTPROCEDURE EVALUATION
Patient: David Samaniego    Procedure(s):  COLONOSCOPY, WITH POLYPECTOMY AND BIOPSY    Diagnosis:Encounter for screening colonoscopy [Z12.11]  Diagnosis Additional Information: No value filed.    Anesthesia Type:  MAC    Note:  Anesthesia Post Evaluation    Patient location during evaluation: Phase 2 and Endoscopy Recovery  Patient participation: Able to fully participate in evaluation  Level of consciousness: awake and alert  Pain management: adequate  Airway patency: patent  Cardiovascular status: acceptable  Respiratory status: acceptable  Hydration status: acceptable  PONV: none     Anesthetic complications: None          Last vitals:  Vitals:    11/16/20 1144 11/16/20 1504   BP: (!) 155/82 (!) 143/87   Pulse: 57 56   Resp: 10 12   Temp: 97.9  F (36.6  C)    SpO2: 99% 98%         Electronically Signed By: CODI CAROLINA CRNA  November 16, 2020  3:14 PM

## 2020-11-16 NOTE — OR NURSING
Pt resting comfortably in bed.  Requested that I call wife and update her.  Did reach pt's wife, updated and set her up with family notification.

## 2020-11-16 NOTE — ANESTHESIA PREPROCEDURE EVALUATION
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     No Comments Provided     Enlarged prostate without lower urinary tract symptoms (luts)     No Comments Provided     Essential (primary) hypertension     No Comments Provided     Gastro-esophageal reflux disease without esophagitis     No Comments Provided     Hypothyroidism     No Comments Provided     Other specified congenital malformations of intestine     No Comments Provided     PONV (postoperative nausea and vomiting)     with surgeries in the 80s     Past Surgical History:   Procedure Laterality Date     BACK SURGERY       and      C TUR PROSTATE BIOPSIES       COLONOSCOPY      05,next due      COLONOSCOPY  10/26/2015    Tubular adenoma,F/U      CYSTOSCOPY, TRANSURETHRAL RESECTION (TUR) PROSTATE, COMBINED N/A 2019    Procedure: CYSTOSCOPY, WITH TRANSURETHRAL RESECTION (TUR) PROSTATE;  Surgeon: Art Liu MD;  Location:  OR     OTHER SURGICAL HISTORY      [id number],PARTIAL THYROIDECTOMY     OTHER SURGICAL HISTORY      2084,ANESTHESIA ALERT,Notes no prior complications from anesthesia.:       Anesthesia Evaluation     . Pt has had prior anesthetic.     History of anesthetic complications   - PONV        ROS/MED HX    ENT/Pulmonary:  - neg pulmonary ROS     Neurologic:  - neg neurologic ROS     Cardiovascular:     (+) hypertension----. : . . . :. .       METS/Exercise Tolerance:  >4 METS   Hematologic:  - neg hematologic  ROS       Musculoskeletal:   (+)  other musculoskeletal- chronic back ache      GI/Hepatic:     (+) GERD Asymptomatic on medication, bowel prep,       Renal/Genitourinary:     (+)  "chronic renal disease, type: CRI, Pt does not require dialysis, Pt has no history of transplant, BPH,       Endo:     (+) thyroid problem hypothyroidism, .      Psychiatric:  - neg psychiatric ROS       Infectious Disease:  - neg infectious disease ROS       Malignancy:      - no malignancy   Other:    - neg other ROS                      Physical Exam  Normal systems: cardiovascular, pulmonary and dental    Airway   Mallampati: I  TM distance: >3 FB  Neck ROM: full    Dental     Cardiovascular   Rhythm and rate: regular and normal      Pulmonary    breath sounds clear to auscultation            Lab Results   Component Value Date    HGB 15.2 01/13/2015    HCT 45.7 01/13/2015     01/13/2015     01/23/2020    POTASSIUM 4.2 01/23/2020    CHLORIDE 106 01/23/2020    CO2 26 01/23/2020    BUN 27 (H) 01/23/2020    CR 1.69 (H) 01/23/2020     (H) 01/23/2020    BENITO 10.5 (H) 01/23/2020    ALBUMIN 4.4 12/26/2013    PROTTOTAL 7.4 12/26/2013       Preop Vitals  BP Readings from Last 3 Encounters:   11/16/20 (!) 155/82   01/23/20 136/76   12/12/19 138/80    Pulse Readings from Last 3 Encounters:   11/16/20 57   01/23/20 53   12/12/19 54      Resp Readings from Last 3 Encounters:   11/16/20 10   01/23/20 16   12/12/19 12    SpO2 Readings from Last 3 Encounters:   11/16/20 99%   01/23/20 98%   08/13/19 99%      Temp Readings from Last 1 Encounters:   11/16/20 97.9  F (36.6  C) (Tympanic)    Ht Readings from Last 1 Encounters:   11/16/20 1.956 m (6' 5\")      Wt Readings from Last 1 Encounters:   11/16/20 100.7 kg (222 lb)    Estimated body mass index is 26.33 kg/m  as calculated from the following:    Height as of this encounter: 1.956 m (6' 5\").    Weight as of this encounter: 100.7 kg (222 lb).       Anesthesia Plan      History & Physical Review      ASA Status:  2 .    NPO Status:  > 8 hours    Plan for MAC            Postoperative Care      Consents  Anesthetic plan, risks, benefits and alternatives discussed " with:  Patient.  Use of blood products discussed: No .   .                 CODI Jasmine CRNA

## 2020-11-16 NOTE — H&P
History and Physical    CHIEF COMPLAINT / REASON FOR PROCEDURE:  H/o polyps    PERTINENT HISTORY   Patient is a 70 year old male who presents today for colonoscopy for h/o polyps.   Last colonoscopy 2015.   Patient has no complaints.    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     No Comments Provided     Enlarged prostate without lower urinary tract symptoms (luts)     No Comments Provided     Essential (primary) hypertension     No Comments Provided     Gastro-esophageal reflux disease without esophagitis     No Comments Provided     Hypothyroidism     No Comments Provided     Other specified congenital malformations of intestine     No Comments Provided     PONV (postoperative nausea and vomiting)     with surgeries in the 80's     Past Surgical History:   Procedure Laterality Date     BACK SURGERY      1986 and 1987     C TUR PROSTATE BIOPSIES       COLONOSCOPY      7/29/05,next due 2015     COLONOSCOPY  10/26/2015    Tubular adenoma,F/U 2020     CYSTOSCOPY, TRANSURETHRAL RESECTION (TUR) PROSTATE, COMBINED N/A 7/30/2019    Procedure: CYSTOSCOPY, WITH TRANSURETHRAL RESECTION (TUR) PROSTATE;  Surgeon: Art Liu MD;  Location:  OR     OTHER SURGICAL HISTORY      [id number],PARTIAL THYROIDECTOMY     OTHER SURGICAL HISTORY      [id number],ANESTHESIA ALERT,Notes no prior complications from anesthesia.:       Bleeding tendencies:  No    ALLERGIES/SENSITIVITIES: No Known Allergies     CURRENT MEDICATIONS:    Prior to Admission medications    Medication Sig Start Date End Date Taking? Authorizing Provider   aspirin 81 MG EC tablet Take 81 mg by mouth daily   Yes Reported, Patient   acyclovir (ZOVIRAX) 400 MG tablet Take 1 tablet (400 mg) by mouth daily 1/23/20   Fady Penaloza MD   bisacodyl (DULCOLAX) 5 MG EC tablet Use as directed per colonoscopy prep. 10/14/20   Eber Rooney MD    cholecalciferol (VITAMIN D3) 5000 units TABS tablet Take by mouth daily    Reported, Patient   hydrochlorothiazide (HYDRODIURIL) 25 MG tablet Take 1 tablet (25 mg) by mouth daily 1/23/20   Fady Penaloza MD   levothyroxine (SYNTHROID/LEVOTHROID) 150 MCG tablet Take 1 tablet (150 mcg) by mouth daily (with breakfast) 1/23/20   Fady Penaloza MD   omeprazole (PRILOSEC) 20 MG DR capsule Take 1 capsule (20 mg) by mouth daily 1/23/20   Fady Penaloza MD       Physical Exam:  There were no vitals taken for this visit.  EXAM:  Chest/Respiratory Exam: Normal - Clear to auscultation without rales, rhonchi, or wheezing.  Cardiovascular Exam: normal, regular rate and rhythm        PLAN: COLONOSCOPY .  Patient understands risks of bleeding, perforation, potential inability to reach cecum, aspiration and wishes to proceed. MAC needed for age.

## 2020-11-30 PROBLEM — K63.5 COLON POLYPS: Status: RESOLVED | Noted: 2020-11-16 | Resolved: 2020-11-30

## 2020-12-27 ENCOUNTER — HEALTH MAINTENANCE LETTER (OUTPATIENT)
Age: 70
End: 2020-12-27

## 2020-12-28 ENCOUNTER — MYC MEDICAL ADVICE (OUTPATIENT)
Dept: FAMILY MEDICINE | Facility: OTHER | Age: 70
End: 2020-12-28

## 2020-12-29 NOTE — TELEPHONE ENCOUNTER
Entered patients high dose flu vaccine as noted by patient.  Mihaela Schwartz LPN ....................  12/29/2020   11:40 AM

## 2021-01-25 ENCOUNTER — OFFICE VISIT (OUTPATIENT)
Dept: FAMILY MEDICINE | Facility: OTHER | Age: 71
End: 2021-01-25
Attending: FAMILY MEDICINE
Payer: COMMERCIAL

## 2021-01-25 VITALS
HEIGHT: 77 IN | SYSTOLIC BLOOD PRESSURE: 136 MMHG | WEIGHT: 225.6 LBS | BODY MASS INDEX: 26.64 KG/M2 | RESPIRATION RATE: 16 BRPM | DIASTOLIC BLOOD PRESSURE: 74 MMHG | TEMPERATURE: 97.4 F | HEART RATE: 63 BPM | OXYGEN SATURATION: 97 %

## 2021-01-25 DIAGNOSIS — Z00.00 ENCOUNTER FOR MEDICARE ANNUAL WELLNESS EXAM: Primary | ICD-10-CM

## 2021-01-25 DIAGNOSIS — R20.9 DISTURBANCE OF SKIN SENSATION: ICD-10-CM

## 2021-01-25 DIAGNOSIS — E03.9 HYPOTHYROIDISM, UNSPECIFIED TYPE: ICD-10-CM

## 2021-01-25 DIAGNOSIS — Z12.5 SCREENING FOR PROSTATE CANCER: ICD-10-CM

## 2021-01-25 DIAGNOSIS — I10 ESSENTIAL HYPERTENSION: ICD-10-CM

## 2021-01-25 DIAGNOSIS — K21.9 GASTROESOPHAGEAL REFLUX DISEASE WITHOUT ESOPHAGITIS: ICD-10-CM

## 2021-01-25 LAB
ANION GAP SERPL CALCULATED.3IONS-SCNC: 6 MMOL/L (ref 3–14)
BUN SERPL-MCNC: 27 MG/DL (ref 7–25)
CALCIUM SERPL-MCNC: 10.5 MG/DL (ref 8.6–10.3)
CHLORIDE SERPL-SCNC: 105 MMOL/L (ref 98–107)
CO2 SERPL-SCNC: 27 MMOL/L (ref 21–31)
CREAT SERPL-MCNC: 1.83 MG/DL (ref 0.7–1.3)
GFR SERPL CREATININE-BSD FRML MDRD: 37 ML/MIN/{1.73_M2}
GLUCOSE SERPL-MCNC: 114 MG/DL (ref 70–105)
POTASSIUM SERPL-SCNC: 4.4 MMOL/L (ref 3.5–5.1)
PSA SERPL-ACNC: 6.37 NG/ML
SODIUM SERPL-SCNC: 138 MMOL/L (ref 134–144)
TSH SERPL DL<=0.05 MIU/L-ACNC: 1.8 IU/ML (ref 0.34–5.6)

## 2021-01-25 PROCEDURE — 80048 BASIC METABOLIC PNL TOTAL CA: CPT | Mod: ZL | Performed by: FAMILY MEDICINE

## 2021-01-25 PROCEDURE — 84443 ASSAY THYROID STIM HORMONE: CPT | Mod: ZL | Performed by: FAMILY MEDICINE

## 2021-01-25 PROCEDURE — 36415 COLL VENOUS BLD VENIPUNCTURE: CPT | Mod: ZL | Performed by: FAMILY MEDICINE

## 2021-01-25 PROCEDURE — G0439 PPPS, SUBSEQ VISIT: HCPCS | Performed by: FAMILY MEDICINE

## 2021-01-25 PROCEDURE — G0103 PSA SCREENING: HCPCS | Mod: ZL | Performed by: FAMILY MEDICINE

## 2021-01-25 RX ORDER — ACYCLOVIR 400 MG/1
400 TABLET ORAL DAILY
Qty: 90 TABLET | Refills: 3 | Status: SHIPPED | OUTPATIENT
Start: 2021-01-25 | End: 2022-02-11

## 2021-01-25 RX ORDER — HYDROCHLOROTHIAZIDE 25 MG/1
25 TABLET ORAL DAILY
Qty: 90 TABLET | Refills: 3 | Status: SHIPPED | OUTPATIENT
Start: 2021-01-25 | End: 2022-02-09

## 2021-01-25 RX ORDER — LEVOTHYROXINE SODIUM 150 UG/1
150 TABLET ORAL
Qty: 90 TABLET | Refills: 3 | Status: SHIPPED | OUTPATIENT
Start: 2021-01-25 | End: 2022-02-09

## 2021-01-25 ASSESSMENT — MIFFLIN-ST. JEOR: SCORE: 1890.69

## 2021-01-25 ASSESSMENT — ANXIETY QUESTIONNAIRES
5. BEING SO RESTLESS THAT IT IS HARD TO SIT STILL: NOT AT ALL
1. FEELING NERVOUS, ANXIOUS, OR ON EDGE: NOT AT ALL
2. NOT BEING ABLE TO STOP OR CONTROL WORRYING: NOT AT ALL
6. BECOMING EASILY ANNOYED OR IRRITABLE: NOT AT ALL
IF YOU CHECKED OFF ANY PROBLEMS ON THIS QUESTIONNAIRE, HOW DIFFICULT HAVE THESE PROBLEMS MADE IT FOR YOU TO DO YOUR WORK, TAKE CARE OF THINGS AT HOME, OR GET ALONG WITH OTHER PEOPLE: NOT DIFFICULT AT ALL
3. WORRYING TOO MUCH ABOUT DIFFERENT THINGS: NOT AT ALL
GAD7 TOTAL SCORE: 0
7. FEELING AFRAID AS IF SOMETHING AWFUL MIGHT HAPPEN: NOT AT ALL

## 2021-01-25 ASSESSMENT — PAIN SCALES - GENERAL: PAINLEVEL: NO PAIN (0)

## 2021-01-25 ASSESSMENT — PATIENT HEALTH QUESTIONNAIRE - PHQ9: 5. POOR APPETITE OR OVEREATING: NOT AT ALL

## 2021-01-25 NOTE — NURSING NOTE
"Coming in for a physical check up    Visual Acuity Screening - Snellen Chart   Visualacuity OD (right eye): 20/ 20   Visual acuity OS (left eye): 20/ 20   Visual acuity OU (both eyes): 20/ 20   Corrective lenses worn: yes    Chief Complaint   Patient presents with     Physical     check up       Initial /74   Pulse 63   Temp 97.4  F (36.3  C)   Resp 16   Ht 1.956 m (6' 5\")   Wt 102.3 kg (225 lb 9.6 oz)   SpO2 97%   BMI 26.75 kg/m   Estimated body mass index is 26.75 kg/m  as calculated from the following:    Height as of this encounter: 1.956 m (6' 5\").    Weight as of this encounter: 102.3 kg (225 lb 9.6 oz).  Medication Reconciliation: complete    Monica Sullivan LPN             "

## 2021-01-25 NOTE — PROGRESS NOTES
"SUBJECTIVE:   David Samaniego is a 70 year old male who presents for Preventive Visit.      Patient has been advised of split billing requirements and indicates understanding: Yes  Are you in the first 12 months of your Medicare Part B coverage?  No    Physical Health:    In general, how would you rate your overall physical health? good    Outside of work, how many days during the week do you exercise? 2-3 days/week    Outside of work, approximately how many minutes a day do you exercise?greater than 60 minutes    If you drink alcohol do you typically have >3 drinks per day or >7 drinks per week? No    Do you usually eat at least 4 servings of fruit and vegetables a day, include whole grains & fiber and avoid regularly eating high fat or \"junk\" foods? Yes    Do you have any problems taking medications regularly?  No    Do you have any side effects from medications? not applicable    Needs assistance for the following daily activities: no assistance needed    Which of the following safety concerns are present in your home?  none identified     Hearing impairment: No    In the past 6 months, have you been bothered by leaking of urine? no    Mental Health:    In general, how would you rate your overall mental or emotional health? good  PHQ-2 Score: 0    Do you feel safe in your environment? Yes    Have you ever done Advance Care Planning? (For example, a Health Directive, POLST, or a discussion with a medical provider or your loved ones about your wishes): Yes, advance care planning is on file.    Additional concerns to address?  No    Fall risk:  Fallen 2 or more times in the past year?: No  Any fall with injury in the past year?: No  click delete button to remove this line now  Cognitive Screenin) Repeat 3 items (Leader, Season, Table)    2) Clock draw: NORMAL  3) 3 item recall: Recalls 2 objects   Results: NORMAL clock, 1-2 items recalled: COGNITIVE IMPAIRMENT LESS LIKELY    Mini-CogTM Copyright ORION James. " Licensed by the author for use in [facility]; reprinted with permission (queta@[facility]). All rights reserved.      Do you have sleep apnea, excessive snoring or daytime drowsiness?: no        wellness    Reviewed and updated as needed this visit by clinical staff  Tobacco  Allergies  Meds   Med Hx    Soc Hx        Reviewed and updated as needed this visit by Provider                Social History     Tobacco Use     Smoking status: Never Smoker     Smokeless tobacco: Never Used   Substance Use Topics     Alcohol use: Yes     Alcohol/week: 0.0 standard drinks     Comment: Alcoholic Drinks/day: occasionally                           Current providers sharing in care for this patient include:    Patient Care Team:  Fady Penaloza MD as PCP - General (Family Practice)  Fady Penaloza MD as Assigned PCP  Art Liu MD as Assigned Surgical Provider    The following health maintenance items are reviewed in Epic and correct as of today:  Health Maintenance   Topic Date Due     HEPATITIS C SCREENING  06/28/1966     ZOSTER IMMUNIZATION (2 of 3) 03/12/2010     AORTIC ANEURYSM SCREENING (SYSTEM ASSIGNED)  06/28/2013     FALL RISK ASSESSMENT  01/23/2021     MEDICARE ANNUAL WELLNESS VISIT  01/23/2021     DTAP/TDAP/TD IMMUNIZATION (2 - Td) 01/13/2025     LIPID  01/23/2025     ADVANCE CARE PLANNING  01/24/2025     COLORECTAL CANCER SCREENING  11/16/2025     PHQ-2  Completed     INFLUENZA VACCINE  Completed     Pneumococcal Vaccine: 65+ Years  Completed     Pneumococcal Vaccine: Pediatrics (0 to 5 Years) and At-Risk Patients (6 to 64 Years)  Aged Out     IPV IMMUNIZATION  Aged Out     MENINGITIS IMMUNIZATION  Aged Out     HEPATITIS B IMMUNIZATION  Aged Out     Lab work is in process  Labs reviewed in [facility]  Current Outpatient Medications   Medication Sig Dispense Refill     acyclovir (ZOVIRAX) 400 MG tablet Take 1 tablet (400 mg) by mouth daily 90 tablet 3     aspirin 81 MG EC tablet Take 81 mg by mouth daily    "    cholecalciferol (VITAMIN D3) 5000 units TABS tablet Take by mouth daily       hydrochlorothiazide (HYDRODIURIL) 25 MG tablet Take 1 tablet (25 mg) by mouth daily 90 tablet 3     levothyroxine (SYNTHROID/LEVOTHROID) 150 MCG tablet Take 1 tablet (150 mcg) by mouth daily (with breakfast) 90 tablet 3     omeprazole (PRILOSEC) 20 MG DR capsule Take 1 capsule (20 mg) by mouth daily 90 capsule 3     No Known Allergies       ROS:  Constitutional, HEENT, cardiovascular, pulmonary, GI, , musculoskeletal, neuro, skin, endocrine and psych systems are negative, except as otherwise noted.    OBJECTIVE:   /74   Pulse 63   Temp 97.4  F (36.3  C)   Resp 16   Ht 1.956 m (6' 5\")   Wt 102.3 kg (225 lb 9.6 oz)   SpO2 97%   BMI 26.75 kg/m   Estimated body mass index is 26.75 kg/m  as calculated from the following:    Height as of this encounter: 1.956 m (6' 5\").    Weight as of this encounter: 102.3 kg (225 lb 9.6 oz).  EXAM:   GENERAL: healthy, alert and no distress  EYES: Eyes grossly normal to inspection, PERRL and conjunctivae and sclerae normal  HENT: ear canals and TM's normal, nose and mouth without ulcers or lesions  NECK: no adenopathy, no asymmetry, masses, or scars and thyroid normal to palpation  RESP: lungs clear to auscultation - no rales, rhonchi or wheezes  CV: regular rate and rhythm, normal S1 S2, no S3 or S4, no murmur, click or rub, no peripheral edema and peripheral pulses strong  ABDOMEN: soft, nontender, no hepatosplenomegaly, no masses and bowel sounds normal  MS: no gross musculoskeletal defects noted, no edema  SKIN: no suspicious lesions or rashes  NEURO: Normal strength and tone, mentation intact and speech normal  PSYCH: mentation appears normal, affect normal/bright    Diagnostic Test Results:  Labs reviewed in Epic  Results for orders placed or performed in visit on 01/25/21   TSH     Status: None   Result Value Ref Range    Thyrotropin 1.80 0.34 - 5.60 IU/mL   PSA Screen GH     Status: " "Abnormal   Result Value Ref Range    PSA Screen 6.374 (H) <3.100 ng/mL   Basic Metabolic Panel     Status: Abnormal   Result Value Ref Range    Sodium 138 134 - 144 mmol/L    Potassium 4.4 3.5 - 5.1 mmol/L    Chloride 105 98 - 107 mmol/L    Carbon Dioxide 27 21 - 31 mmol/L    Anion Gap 6 3 - 14 mmol/L    Glucose 114 (H) 70 - 105 mg/dL    Urea Nitrogen 27 (H) 7 - 25 mg/dL    Creatinine 1.83 (H) 0.70 - 1.30 mg/dL    GFR Estimate 37 (L) >60 mL/min/[1.73_m2]    GFR Estimate If Black 44 (L) >60 mL/min/[1.73_m2]    Calcium 10.5 (H) 8.6 - 10.3 mg/dL         ASSESSMENT / PLAN:       ICD-10-CM    1. Encounter for Medicare annual wellness exam  Z00.00    2. Disturbance of skin sensation  R20.9 acyclovir (ZOVIRAX) 400 MG tablet   3. Essential hypertension  I10 hydrochlorothiazide (HYDRODIURIL) 25 MG tablet     Basic Metabolic Panel   4. Hypothyroidism, unspecified type  E03.9 levothyroxine (SYNTHROID/LEVOTHROID) 150 MCG tablet     TSH   5. Gastroesophageal reflux disease without esophagitis  K21.9 omeprazole (PRILOSEC) 20 MG DR capsule   6. Screening for prostate cancer  Z12.5 PSA Screen GH       Patient has been advised of split billing requirements and indicates understanding: Yes    COUNSELING:  Reviewed preventive health counseling, as reflected in patient instructions       Regular exercise       Healthy diet/nutrition       Prostate cancer screening    Estimated body mass index is 26.75 kg/m  as calculated from the following:    Height as of this encounter: 1.956 m (6' 5\").    Weight as of this encounter: 102.3 kg (225 lb 9.6 oz).        He reports that he has never smoked. He has never used smokeless tobacco.    Appropriate preventive services were discussed with this patient, including applicable screening as appropriate for cardiovascular disease, diabetes, osteopenia/osteoporosis, and glaucoma.  As appropriate for age/gender, discussed screening for colorectal cancer, prostate cancer, breast cancer, and cervical " cancer. Checklist reviewing preventive services available has been given to the patient.    Reviewed patients plan of care and provided an AVS. The Basic Care Plan (routine screening as documented in Health Maintenance) for David meets the Care Plan requirement. This Care Plan has been established and reviewed with the Patient.    Counseling Resources:  ATP IV Guidelines  Pooled Cohorts Equation Calculator  Breast Cancer Risk Calculator  BRCA-Related Cancer Risk Assessment: FHS-7 Tool  FRAX Risk Assessment  ICSI Preventive Guidelines  Dietary Guidelines for Americans, 2010  USDA's MyPlate  ASA Prophylaxis  Lung CA Screening    Fady Penaloza MD  [facility] AND \[facility] [place] [facility]\""

## 2021-01-25 NOTE — PATIENT INSTRUCTIONS
Patient Education   Personalized Prevention Plan  You are due for the preventive services outlined below.  Your care team is available to assist you in scheduling these services.  If you have already completed any of these items, please share that information with your care team to update in your medical record.  Health Maintenance Due   Topic Date Due     Hepatitis C Screening  06/28/1968     Zoster (Shingles) Vaccine (2 of 3) 03/12/2010     AORTIC ANEURYSM SCREENING (SYSTEM ASSIGNED)  06/28/2013     FALL RISK ASSESSMENT  01/23/2021

## 2021-01-26 ASSESSMENT — ANXIETY QUESTIONNAIRES: GAD7 TOTAL SCORE: 0

## 2021-03-04 ENCOUNTER — IMMUNIZATION (OUTPATIENT)
Dept: FAMILY MEDICINE | Facility: OTHER | Age: 71
End: 2021-03-04
Attending: FAMILY MEDICINE
Payer: COMMERCIAL

## 2021-03-04 PROCEDURE — 91300 PR COVID VAC PFIZER DIL RECON 30 MCG/0.3 ML IM: CPT

## 2021-03-25 ENCOUNTER — IMMUNIZATION (OUTPATIENT)
Dept: FAMILY MEDICINE | Facility: OTHER | Age: 71
End: 2021-03-25
Attending: FAMILY MEDICINE
Payer: COMMERCIAL

## 2021-03-25 PROCEDURE — 91300 PR COVID VAC PFIZER DIL RECON 30 MCG/0.3 ML IM: CPT

## 2021-10-06 ENCOUNTER — IMMUNIZATION (OUTPATIENT)
Dept: FAMILY MEDICINE | Facility: OTHER | Age: 71
End: 2021-10-06
Attending: FAMILY MEDICINE
Payer: COMMERCIAL

## 2021-10-06 PROCEDURE — 91300 PR COVID VAC PFIZER DIL RECON 30 MCG/0.3 ML IM: CPT

## 2021-11-01 DIAGNOSIS — R20.9 DISTURBANCE OF SKIN SENSATION: ICD-10-CM

## 2021-11-01 DIAGNOSIS — E03.9 HYPOTHYROIDISM, UNSPECIFIED TYPE: ICD-10-CM

## 2021-11-01 DIAGNOSIS — I10 ESSENTIAL HYPERTENSION: ICD-10-CM

## 2021-11-03 RX ORDER — LEVOTHYROXINE SODIUM 150 UG/1
TABLET ORAL
Qty: 90 TABLET | Refills: 3 | OUTPATIENT
Start: 2021-11-03

## 2021-11-03 RX ORDER — ACYCLOVIR 400 MG/1
TABLET ORAL
Qty: 90 TABLET | Refills: 3 | OUTPATIENT
Start: 2021-11-03

## 2021-11-03 RX ORDER — HYDROCHLOROTHIAZIDE 25 MG/1
TABLET ORAL
Qty: 90 TABLET | Refills: 3 | OUTPATIENT
Start: 2021-11-03

## 2021-11-03 NOTE — TELEPHONE ENCOUNTER
Redundant refill request refused: Too soon:    Unable to complete prescription refill per RN Medication Refill Policy.................... Rashida Beckman RN ....................  11/3/2021   9:17 AM

## 2022-02-04 ENCOUNTER — OFFICE VISIT (OUTPATIENT)
Dept: FAMILY MEDICINE | Facility: OTHER | Age: 72
End: 2022-02-04
Attending: NURSE PRACTITIONER
Payer: COMMERCIAL

## 2022-02-04 ENCOUNTER — TELEPHONE (OUTPATIENT)
Dept: FAMILY MEDICINE | Facility: OTHER | Age: 72
End: 2022-02-04

## 2022-02-04 VITALS
BODY MASS INDEX: 27.41 KG/M2 | OXYGEN SATURATION: 100 % | HEART RATE: 61 BPM | SYSTOLIC BLOOD PRESSURE: 134 MMHG | RESPIRATION RATE: 18 BRPM | DIASTOLIC BLOOD PRESSURE: 82 MMHG | TEMPERATURE: 96.5 F | WEIGHT: 232.1 LBS | HEIGHT: 77 IN

## 2022-02-04 DIAGNOSIS — S51.812A LACERATION OF LEFT FOREARM, INITIAL ENCOUNTER: Primary | ICD-10-CM

## 2022-02-04 DIAGNOSIS — Z23 NEED FOR IMMUNIZATION USING DIPHTHERIA-TETANUS-PERTUSSIS WITH TYPHOID-PARATYPHOID (DTP + TAB) VACCINE: ICD-10-CM

## 2022-02-04 PROCEDURE — 12004 RPR S/N/AX/GEN/TRK7.6-12.5CM: CPT | Performed by: NURSE PRACTITIONER

## 2022-02-04 PROCEDURE — 90471 IMMUNIZATION ADMIN: CPT

## 2022-02-04 PROCEDURE — 250N000009 HC RX 250: Performed by: NURSE PRACTITIONER

## 2022-02-04 PROCEDURE — 12002 RPR S/N/AX/GEN/TRNK2.6-7.5CM: CPT | Performed by: NURSE PRACTITIONER

## 2022-02-04 PROCEDURE — 90715 TDAP VACCINE 7 YRS/> IM: CPT

## 2022-02-04 PROCEDURE — 12004 RPR S/N/AX/GEN/TRK7.6-12.5CM: CPT

## 2022-02-04 PROCEDURE — G0463 HOSPITAL OUTPT CLINIC VISIT: HCPCS

## 2022-02-04 RX ORDER — LIDOCAINE HYDROCHLORIDE 10 MG/ML
10 INJECTION, SOLUTION EPIDURAL; INFILTRATION; INTRACAUDAL; PERINEURAL ONCE
Status: COMPLETED | OUTPATIENT
Start: 2022-02-04 | End: 2022-02-04

## 2022-02-04 RX ADMIN — LIDOCAINE HYDROCHLORIDE 10 ML: 10 INJECTION, SOLUTION EPIDURAL; INFILTRATION; INTRACAUDAL; PERINEURAL at 15:56

## 2022-02-04 ASSESSMENT — PAIN SCALES - GENERAL: PAINLEVEL: MILD PAIN (2)

## 2022-02-04 ASSESSMENT — MIFFLIN-ST. JEOR: SCORE: 1907.24

## 2022-02-04 NOTE — NURSING NOTE
"Chief Complaint   Patient presents with     Laceration     Patient is here for a cut on his left arm that happened about 45 minutes ago. Patient states he slipped on some ice and hit his arm on his steps outside. Patient states the steps are composite.     Initial /82   Pulse 61   Temp (!) 96.5  F (35.8  C) (Tympanic)   Resp 18   Ht 1.943 m (6' 4.5\")   Wt 105.3 kg (232 lb 1.6 oz)   SpO2 100%   BMI 27.88 kg/m   Estimated body mass index is 27.88 kg/m  as calculated from the following:    Height as of this encounter: 1.943 m (6' 4.5\").    Weight as of this encounter: 105.3 kg (232 lb 1.6 oz).  Medication Reconciliation: complete    Kathleen Joe LPN on 2/4/2022 at 2:26 PM    "

## 2022-02-04 NOTE — PROGRESS NOTES
ASSESSMENT/PLAN:    I have reviewed the nursing notes.  I have reviewed the findings, diagnosis, plan and need for follow up with the patient.    1. Need for immunization using diphtheria-tetanus-pertussis with typhoid-paratyphoid (DTP + TAB) vaccine    - TDAP VACCINE (Adacel, Boostrix)  [[id number]]    2. Laceration of left forearm, initial encounter    - lidocaine (PF) (XYLOCAINE) 1 % injection 10 mL    Explanation of diagnostic considerations and recommendations given to the patient, who voiced understanding and agreement with the treatment plan. All questions were answered.     HPI:    David Samaniego is a 71 year old male  who presents to [facility] today for laceration left arm.  Pt slipped on ice landing on left forearm.  Is not on anticoagulants-on ASA 81.  Minimal pain.  No LOC.  Denies SOB, fevers, chills, local or systemic signs of infection. 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     No Comments Provided     Enlarged prostate without lower urinary tract symptoms (luts)     No Comments Provided     Essential (primary) hypertension     No Comments Provided     Gastro-esophageal reflux disease without esophagitis     No Comments Provided     Hypothyroidism     No Comments Provided     Other specified congenital malformations of intestine     No Comments Provided     PONV (postoperative nausea and vomiting)     with surgeries in the 80's     Past Surgical History:   Procedure Laterality Date     BACK SURGERY      1986 and 1987     COLONOSCOPY      7/29/05,next due 2015     COLONOSCOPY  10/26/2015    Tubular adenoma,F/U 2020     COLONOSCOPY N/A 11/16/2020    F/U 2025 tubular adenomas     CYSTOSCOPY, TRANSURETHRAL RESECTION (TUR) PROSTATE, COMBINED N/A 07/30/2019    Procedure: CYSTOSCOPY, WITH TRANSURETHRAL RESECTION (TUR) PROSTATE;  Surgeon: Art Liu  "MD PAULO;  Location:  OR     OTHER SURGICAL HISTORY      [id number],PARTIAL THYROIDECTOMY     OTHER SURGICAL HISTORY      [id number],ANESTHESIA ALERT,Notes no prior complications from anesthesia.:     Z TUR PROSTATE BIOPSIES       Social History     Tobacco Use     Smoking status: Never Smoker     Smokeless tobacco: Never Used   Substance Use Topics     Alcohol use: Yes     Alcohol/week: 0.0 standard drinks     Comment: Alcoholic Drinks/day: occasionally     Current Outpatient Medications   Medication Sig Dispense Refill     acyclovir (ZOVIRAX) 400 MG tablet Take 1 tablet (400 mg) by mouth daily 90 tablet 3     aspirin 81 MG EC tablet Take 81 mg by mouth daily       cholecalciferol (VITAMIN D3) 5000 units TABS tablet Take by mouth daily       hydrochlorothiazide (HYDRODIURIL) 25 MG tablet Take 1 tablet (25 mg) by mouth daily 90 tablet 3     levothyroxine (SYNTHROID/LEVOTHROID) 150 MCG tablet Take 1 tablet (150 mcg) by mouth daily (with breakfast) 90 tablet 3     omeprazole (PRILOSEC) 20 MG DR capsule Take 1 capsule (20 mg) by mouth daily 90 capsule 3     No Known Allergies      Past medical history, past surgical history, current medications and allergies reviewed and accurate to the best of my knowledge.        ROS:  Refer to HPI    /82   Pulse 61   Temp (!) 96.5  F (35.8  C) (Tympanic)   Resp 18   Ht 1.943 m (6' 4.5\")   Wt 105.3 kg (232 lb 1.6 oz)   SpO2 100%   BMI 27.88 kg/m      EXAM:  General Appearance: Well appearing, appropriate appearance for age. No acute distress  Musculoskeletal:  Equal movement of bilateral upper extremities.  Demonstrates full ROM left elbow and wrist. No numbness or tingling. Equal movement of bilateral lower extremities.  Normal gait.    Dermatological: V-shaped laceration posterior aspect left forearm.  3 cm x 6 cm linear.   Psychological: normal affect, alert, oriented, and pleasant.       Procedural note:   Options are discussed and patient decided to proceed with the suture " placement.  Risks and benefits discussed.  Verbal consent obtained.    Preparation: Patient was prepped and draped in usual sterile fashion.  Irrigation solution: saline   Body area:left posterior forearm  Laceration description: V shaped  Laceration length: 3 cm (one side) and 6 cm (second side)  Contamination: No  Debridement: None  Foreign bodies: No  Tendon involvement: No  Anesthesia: Local  Anesthetic Type: Lidocaine 1% without epinephrine  Closure: Simple  Suture: 4-0 nylon, nonabsorbable, interrupted  Number of Sutures: 13  Approximation: well approximated.   Suture removal in 14 day(s)     Patient tolerance: Patient tolerated the procedure well with no immediate complications.

## 2022-02-09 DIAGNOSIS — I10 ESSENTIAL HYPERTENSION: ICD-10-CM

## 2022-02-09 DIAGNOSIS — K21.9 GASTROESOPHAGEAL REFLUX DISEASE WITHOUT ESOPHAGITIS: ICD-10-CM

## 2022-02-09 DIAGNOSIS — E03.9 HYPOTHYROIDISM, UNSPECIFIED TYPE: ICD-10-CM

## 2022-02-09 RX ORDER — HYDROCHLOROTHIAZIDE 25 MG/1
25 TABLET ORAL DAILY
Qty: 90 TABLET | Refills: 0 | Status: SHIPPED | OUTPATIENT
Start: 2022-02-09 | End: 2022-04-21

## 2022-02-09 RX ORDER — LEVOTHYROXINE SODIUM 150 UG/1
150 TABLET ORAL
Qty: 90 TABLET | Refills: 0 | Status: SHIPPED | OUTPATIENT
Start: 2022-02-09 | End: 2022-04-21

## 2022-02-09 NOTE — TELEPHONE ENCOUNTER
TJP-pt needs [facility] until scheduled appointment. Thank you.  Denise Joyce

## 2022-02-10 ENCOUNTER — MYC MEDICAL ADVICE (OUTPATIENT)
Dept: FAMILY MEDICINE | Facility: OTHER | Age: 72
End: 2022-02-10
Payer: COMMERCIAL

## 2022-02-10 DIAGNOSIS — R20.9 DISTURBANCE OF SKIN SENSATION: ICD-10-CM

## 2022-02-11 RX ORDER — ACYCLOVIR 400 MG/1
400 TABLET ORAL DAILY
Qty: 90 TABLET | Refills: 3 | Status: SHIPPED | OUTPATIENT
Start: 2022-02-11 | End: 2023-02-28

## 2022-02-14 ENCOUNTER — ALLIED HEALTH/NURSE VISIT (OUTPATIENT)
Dept: FAMILY MEDICINE | Facility: OTHER | Age: 72
End: 2022-02-14
Attending: PHYSICIAN ASSISTANT
Payer: COMMERCIAL

## 2022-02-14 DIAGNOSIS — Z48.02 ENCOUNTER FOR REMOVAL OF SUTURES: Primary | ICD-10-CM

## 2022-02-14 NOTE — NURSING NOTE
"Chief Complaint   Patient presents with     Suture Removal     Patient presented to the clinic to have sutures removed that where placed on 02/24/22. Removed eight sutures form the right proximal volar forearm, no drainage and area is well approximated. Patient handled suture removal well.    Initial There were no vitals taken for this visit. Estimated body mass index is 27.88 kg/m  as calculated from the following:    Height as of 2/4/22: 1.943 m (6' 4.5\").    Weight as of 2/4/22: 105.3 kg (232 lb 1.6 oz).       FOOD SECURITY SCREENING QUESTIONS:    The next two questions are to help us understand your food security.  If you are feeling you need any assistance in this area, we have resources available to support you today.    Hunger Vital Signs:  Within the past 12 months we worried whether our food would run out before we got money to buy more. Never  Within the past 12 months the food we bought just didn't last and we didn't have money to get more. Never      Medication Reconciliation: Complete      Carine Fajardo LPN  "

## 2022-02-28 ENCOUNTER — OFFICE VISIT (OUTPATIENT)
Dept: FAMILY MEDICINE | Facility: OTHER | Age: 72
End: 2022-02-28
Attending: FAMILY MEDICINE
Payer: COMMERCIAL

## 2022-02-28 VITALS
BODY MASS INDEX: 26.99 KG/M2 | HEART RATE: 62 BPM | SYSTOLIC BLOOD PRESSURE: 136 MMHG | HEIGHT: 77 IN | TEMPERATURE: 97.6 F | OXYGEN SATURATION: 100 % | WEIGHT: 228.6 LBS | RESPIRATION RATE: 16 BRPM | DIASTOLIC BLOOD PRESSURE: 74 MMHG

## 2022-02-28 DIAGNOSIS — R31.0 GROSS HEMATURIA: ICD-10-CM

## 2022-02-28 DIAGNOSIS — R97.20 ELEVATED PROSTATE SPECIFIC ANTIGEN (PSA): ICD-10-CM

## 2022-02-28 DIAGNOSIS — Z12.5 SCREENING FOR PROSTATE CANCER: ICD-10-CM

## 2022-02-28 DIAGNOSIS — E03.9 HYPOTHYROIDISM, UNSPECIFIED TYPE: ICD-10-CM

## 2022-02-28 DIAGNOSIS — Z00.00 ENCOUNTER FOR MEDICARE ANNUAL WELLNESS EXAM: Primary | ICD-10-CM

## 2022-02-28 DIAGNOSIS — Z13.220 LIPID SCREENING: ICD-10-CM

## 2022-02-28 DIAGNOSIS — I10 ESSENTIAL HYPERTENSION: ICD-10-CM

## 2022-02-28 LAB
ALBUMIN UR-MCNC: 10 MG/DL
ANION GAP SERPL CALCULATED.3IONS-SCNC: 7 MMOL/L (ref 3–14)
APPEARANCE UR: CLEAR
BACTERIA #/AREA URNS HPF: ABNORMAL /HPF
BILIRUB UR QL STRIP: NEGATIVE
BUN SERPL-MCNC: 24 MG/DL (ref 7–25)
CALCIUM SERPL-MCNC: 10.9 MG/DL (ref 8.6–10.3)
CHLORIDE BLD-SCNC: 105 MMOL/L (ref 98–107)
CHOLEST SERPL-MCNC: 183 MG/DL
CO2 SERPL-SCNC: 28 MMOL/L (ref 21–31)
COLOR UR AUTO: ABNORMAL
CREAT SERPL-MCNC: 1.65 MG/DL (ref 0.7–1.3)
ERYTHROCYTE [DISTWIDTH] IN BLOOD BY AUTOMATED COUNT: 13.7 % (ref 10–15)
FASTING STATUS PATIENT QL REPORTED: ABNORMAL
GFR SERPL CREATININE-BSD FRML MDRD: 44 ML/MIN/1.73M2
GLUCOSE BLD-MCNC: 108 MG/DL (ref 70–105)
GLUCOSE UR STRIP-MCNC: NEGATIVE MG/DL
HCT VFR BLD AUTO: 42.5 % (ref 40–53)
HDLC SERPL-MCNC: 57 MG/DL (ref 23–92)
HGB BLD-MCNC: 14.3 G/DL (ref 13.3–17.7)
HGB UR QL STRIP: NEGATIVE
KETONES UR STRIP-MCNC: NEGATIVE MG/DL
LDLC SERPL CALC-MCNC: 113 MG/DL
LEUKOCYTE ESTERASE UR QL STRIP: NEGATIVE
MCH RBC QN AUTO: 29.9 PG (ref 26.5–33)
MCHC RBC AUTO-ENTMCNC: 33.6 G/DL (ref 31.5–36.5)
MCV RBC AUTO: 89 FL (ref 78–100)
MUCOUS THREADS #/AREA URNS LPF: PRESENT /LPF
NITRATE UR QL: NEGATIVE
NONHDLC SERPL-MCNC: 126 MG/DL
PH UR STRIP: 6 [PH] (ref 5–9)
PLATELET # BLD AUTO: 293 10E3/UL (ref 150–450)
POTASSIUM BLD-SCNC: 4.2 MMOL/L (ref 3.5–5.1)
PSA SERPL-MCNC: 8.35 UG/L (ref 0–4)
RBC # BLD AUTO: 4.78 10E6/UL (ref 4.4–5.9)
RBC URINE: <1 /HPF
SODIUM SERPL-SCNC: 140 MMOL/L (ref 134–144)
SP GR UR STRIP: 1.02 (ref 1–1.03)
TRIGL SERPL-MCNC: 65 MG/DL
TSH SERPL DL<=0.005 MIU/L-ACNC: 1.45 MU/L (ref 0.4–4)
UROBILINOGEN UR STRIP-MCNC: NORMAL MG/DL
WBC # BLD AUTO: 6.3 10E3/UL (ref 4–11)
WBC URINE: 2 /HPF

## 2022-02-28 PROCEDURE — 80048 BASIC METABOLIC PNL TOTAL CA: CPT | Mod: ZL | Performed by: FAMILY MEDICINE

## 2022-02-28 PROCEDURE — G0463 HOSPITAL OUTPT CLINIC VISIT: HCPCS

## 2022-02-28 PROCEDURE — 80061 LIPID PANEL: CPT | Mod: ZL | Performed by: FAMILY MEDICINE

## 2022-02-28 PROCEDURE — G0439 PPPS, SUBSEQ VISIT: HCPCS | Performed by: FAMILY MEDICINE

## 2022-02-28 PROCEDURE — 84443 ASSAY THYROID STIM HORMONE: CPT | Mod: ZL | Performed by: FAMILY MEDICINE

## 2022-02-28 PROCEDURE — 99213 OFFICE O/P EST LOW 20 MIN: CPT | Mod: 25 | Performed by: FAMILY MEDICINE

## 2022-02-28 PROCEDURE — 36415 COLL VENOUS BLD VENIPUNCTURE: CPT | Mod: ZL | Performed by: FAMILY MEDICINE

## 2022-02-28 PROCEDURE — G0103 PSA SCREENING: HCPCS | Mod: ZL | Performed by: FAMILY MEDICINE

## 2022-02-28 PROCEDURE — 81001 URINALYSIS AUTO W/SCOPE: CPT | Mod: ZL | Performed by: FAMILY MEDICINE

## 2022-02-28 PROCEDURE — 85027 COMPLETE CBC AUTOMATED: CPT | Mod: ZL | Performed by: FAMILY MEDICINE

## 2022-02-28 RX ORDER — LOSARTAN POTASSIUM 25 MG/1
25 TABLET ORAL DAILY
Qty: 90 TABLET | Refills: 4 | Status: SHIPPED | OUTPATIENT
Start: 2022-02-28 | End: 2022-04-26

## 2022-02-28 ASSESSMENT — ACTIVITIES OF DAILY LIVING (ADL): CURRENT_FUNCTION: NO ASSISTANCE NEEDED

## 2022-02-28 ASSESSMENT — PAIN SCALES - GENERAL: PAINLEVEL: NO PAIN (0)

## 2022-02-28 NOTE — PROGRESS NOTES
"SUBJECTIVE:   David Samaniego is a 71 year old male who presents for Preventive Visit.      Patient has been advised of split billing requirements and indicates understanding: Yes  Are you in the first 12 months of your Medicare coverage?  No    Healthy Habits:     In general, how would you rate your overall health?  Good    Frequency of exercise:  2-3 days/week    Duration of exercise:  30-45 minutes    Do you usually eat at least 4 servings of fruit and vegetables a day, include whole grains    & fiber and avoid regularly eating high fat or \"junk\" foods?  No    Taking medications regularly:  Yes    Medication side effects:  None    Ability to successfully perform activities of daily living:  No assistance needed    Home Safety:  No safety concerns identified    Hearing Impairment:  No hearing concerns    In the past 6 months, have you been bothered by leaking of urine? Yes    In general, how would you rate your overall mental or emotional health?  Good      PHQ-2 Total Score: 0    Additional concerns today:  No    Do you feel safe in your environment? Yes    Have you ever done Advance Care Planning? (For example, a Health Directive, POLST, or a discussion with a medical provider or your loved ones about your wishes): Yes, advance care planning is on file.       Fall risk     click delete button to remove this line now  Cognitive Screening   1) Repeat 3 items (Leader, Season, Table)    2) Clock draw: NORMAL  3) 3 item recall:   Recalls 3 objects  Results: NORMAL clock, 1-2 items recalled: COGNITIVE IMPAIRMENT LESS LIKELY    Mini-CogTM Copyright ORION James. Licensed by the author for use in [facility]; reprinted with permission ([email or web address].[email or web address]). All rights reserved.      Do you have sleep apnea, excessive snoring or daytime drowsiness?: no    Reviewed and updated as needed this visit by clinical staff   Tobacco  Allergies  Meds   Med Hx            Reviewed and updated as needed this visit by Provider     "             Social History     Tobacco Use     Smoking status: Never Smoker     Smokeless tobacco: Never Used   Substance Use Topics     Alcohol use: Yes     Alcohol/week: 0.0 standard drinks     Comment: Alcoholic Drinks/day: occasionally     If you drink alcohol do you typically have >3 drinks per day or >7 drinks per week? No    Alcohol Use 2/28/2022   Prescreen: >3 drinks/day or >7 drinks/week? No   No flowsheet data found.        WELLNESS    Current providers sharing in care for this patient include:    Patient Care Team:  Fady Penaloza MD as PCP - General (Family Practice)  Fady Penaloza MD as Assigned PCP    The following health maintenance items are reviewed in Epic and correct as of today:  Health Maintenance Due   Topic Date Due     MICROALBUMIN  Never done     HEPATITIS C SCREENING  Never done     ZOSTER IMMUNIZATION (2 of 3) 03/12/2010     AORTIC ANEURYSM SCREENING (SYSTEM ASSIGNED)  Never done     HEMOGLOBIN  01/13/2016     LIPID  01/23/2021     MEDICARE ANNUAL WELLNESS VISIT  01/25/2022     BMP  01/25/2022     FALL RISK ASSESSMENT  01/25/2022     Lab work is in process  [facility]  Current Outpatient Medications   Medication Sig Dispense Refill     acyclovir (ZOVIRAX) 400 MG tablet Take 1 tablet (400 mg) by mouth daily 90 tablet 3     cholecalciferol (VITAMIN D3) 5000 units TABS tablet Take by mouth daily       hydrochlorothiazide (HYDRODIURIL) 25 MG tablet Take 1 tablet (25 mg) by mouth daily 90 tablet 0     levothyroxine (SYNTHROID/LEVOTHROID) 150 MCG tablet Take 1 tablet (150 mcg) by mouth daily (with breakfast) 90 tablet 0     omeprazole (PRILOSEC) 20 MG DR capsule Take 1 capsule (20 mg) by mouth daily 90 capsule 3     No Known Allergies          Review of Systems HAD HEMATURIA 3 TIMES LAST SUMMER, after WORKING OUTSIDE.  Bleeds easily, form his ASA. Prior TURP too.  Had no dysuria, flank pain or fevers with these spells.      Constitutional, HEENT, cardiovascular, pulmonary, GI, ,  "musculoskeletal, neuro, skin, endocrine and psych systems are negative, except as otherwise noted.    OBJECTIVE:   /74   Pulse 62   Temp 97.6  F (36.4  C)   Resp 16   Ht 1.956 m (6' 5\")   Wt 103.7 kg (228 lb 9.6 oz)   SpO2 100%   BMI 27.11 kg/m   Estimated body mass index is 27.11 kg/m  as calculated from the following:    Height as of this encounter: 1.956 m (6' 5\").    Weight as of this encounter: 103.7 kg (228 lb 9.6 oz).  Physical Exam  GENERAL: healthy, alert and no distress  EYES: Eyes grossly normal to inspection, PERRL and conjunctivae and sclerae normal  HENT: ear canals and TM's normal, nose and mouth without ulcers or lesions  NECK: no adenopathy, no asymmetry, masses, or scars and thyroid normal to palpation  RESP: lungs clear to auscultation - no rales, rhonchi or wheezes  CV: regular rate and rhythm, normal S1 S2, no S3 or S4, no murmur, click or rub, no peripheral edema and peripheral pulses strong  ABDOMEN: soft, nontender, no hepatosplenomegaly, no masses and bowel sounds normal  MS: no gross musculoskeletal defects noted, no edema  SKIN: no suspicious lesions or rashes  NEURO: Normal strength and tone, mentation intact and speech normal  PSYCH: mentation appears normal, affect normal/bright    Diagnostic Test Results:  Labs reviewed in Epic  Results for orders placed or performed in visit on 02/28/22   CBC W PLT No Diff     Status: Normal   Result Value Ref Range    WBC Count 6.3 4.0 - 11.0 10e3/uL    RBC Count 4.78 4.40 - 5.90 10e6/uL    Hemoglobin 14.3 13.3 - 17.7 g/dL    Hematocrit 42.5 40.0 - 53.0 %    MCV 89 78 - 100 fL    MCH 29.9 26.5 - 33.0 pg    MCHC 33.6 31.5 - 36.5 g/dL    RDW 13.7 10.0 - 15.0 %    Platelet Count 293 150 - 450 10e3/uL   UA reflex to Microscopic     Status: Abnormal   Result Value Ref Range    Color Urine Light Yellow Colorless, Straw, Light Yellow, Yellow    Appearance Urine Clear Clear    Glucose Urine Negative Negative mg/dL    Bilirubin Urine Negative " Negative    Ketones Urine Negative Negative mg/dL    Specific Gravity Urine 1.017 1.000 - 1.030    Blood Urine Negative Negative    pH Urine 6.0 5.0 - 9.0    Protein Albumin Urine 10  (A) Negative mg/dL    Urobilinogen Urine Normal Normal, 2.0 mg/dL    Nitrite Urine Negative Negative    Leukocyte Esterase Urine Negative Negative    Bacteria Urine Few (A) None Seen /HPF    RBC Urine <1 <=2 /HPF    WBC Urine 2 <=5 /HPF    Mucus Urine Present (A) None Seen /LPF   Lipid Panel     Status: Abnormal   Result Value Ref Range    Cholesterol 183 <200 mg/dL    Triglycerides 65 <150 mg/dL    Direct Measure HDL 57 23 - 92 mg/dL    LDL Cholesterol Calculated 113 (H) <=100 mg/dL    Non HDL Cholesterol 126 <130 mg/dL    Patient Fasting > 8hrs? Unknown     Narrative    Cholesterol  Desirable:  <200 mg/dL    Triglycerides  Normal:  Less than 150 mg/dL  Borderline High:  150-199 mg/dL  High:  200-499 mg/dL  Very High:  Greater than or equal to 500 mg/dL    Direct Measure HDL  Female:  Greater than or equal to 50 mg/dL   Male:  Greater than or equal to 40 mg/dL    LDL Cholesterol  Desirable:  <100mg/dL  Above Desirable:  100-129 mg/dL   Borderline High:  130-159 mg/dL   High:  160-189 mg/dL   Very High:  >= 190 mg/dL    Non HDL Cholesterol  Desirable:  130 mg/dL  Above Desirable:  130-159 mg/dL  Borderline High:  160-189 mg/dL  High:  190-219 mg/dL  Very High:  Greater than or equal to 220 mg/dL   PSA Screen GH     Status: Abnormal   Result Value Ref Range    Prostate Specific Antigen Screen 8.35 (H) 0.00 - 4.00 ug/L    Narrative    The DXI Access PSAS WHO assay is a two site immunoenzymatic   assay. Assay values obtained with different assay methods cannot be used   interchangeably due to differences in assay methods and reagent specificity.   TSH     Status: Normal   Result Value Ref Range    TSH 1.45 0.40 - 4.00 mU/L   Basic Metabolic Panel     Status: Abnormal   Result Value Ref Range    Sodium 140 134 - 144 mmol/L    Potassium 4.2  "3.5 - 5.1 mmol/L    Chloride 105 98 - 107 mmol/L    Carbon Dioxide (CO2) 28 21 - 31 mmol/L    Anion Gap 7 3 - 14 mmol/L    Urea Nitrogen 24 7 - 25 mg/dL    Creatinine 1.65 (H) 0.70 - 1.30 mg/dL    Calcium 10.9 (H) 8.6 - 10.3 mg/dL    Glucose 108 (H) 70 - 105 mg/dL    GFR Estimate 44 (L) >60 mL/min/1.73m2         ASSESSMENT / PLAN:       ICD-10-CM    1. Encounter for Medicare annual wellness exam  Z00.00    2. Hypothyroidism, unspecified type  E03.9 TSH   3. Essential hypertension  I10 Basic Metabolic Panel     losartan (COZAAR) 25 MG tablet   4. Screening for prostate cancer  Z12.5 PSA Screen GH   5. Lipid screening  Z13.220 Lipid Panel   6. Gross hematuria  R31.0 UA reflex to Microscopic     CBC W PLT No Diff     Blood pressure is not quite at goal, so added on the cozaar.  With rising PSA will have him consult with urology.  Cr is mildly elevated, again lifts weights so this might be falsely elevated, but could also have early obstructive uropathy.  He had hematuria last summer, nothing now.  Will defer to urology on if this needs a work up now or not.          COUNSELING:  Reviewed preventive health counseling, as reflected in patient instructions       Regular exercise       Healthy diet/nutrition       Bladder control    Estimated body mass index is 27.11 kg/m  as calculated from the following:    Height as of this encounter: 1.956 m (6' 5\").    Weight as of this encounter: 103.7 kg (228 lb 9.6 oz).        He reports that he has never smoked. He has never used smokeless tobacco.      Appropriate preventive services were discussed with this patient, including applicable screening as appropriate for cardiovascular disease, diabetes, osteopenia/osteoporosis, and glaucoma.  As appropriate for age/gender, discussed screening for colorectal cancer, prostate cancer, breast cancer, and cervical cancer. Checklist reviewing preventive services available has been given to the patient.    Reviewed patients plan of care and " provided an AVS. The Basic Care Plan (routine screening as documented in Health Maintenance) for David meets the Care Plan requirement. This Care Plan has been established and reviewed with the Patient.    Counseling Resources:  ATP IV Guidelines  Pooled Cohorts Equation Calculator  Breast Cancer Risk Calculator  Breast Cancer: Medication to Reduce Risk  FRAX Risk Assessment  ICSI Preventive Guidelines  Dietary Guidelines for Americans, 2010  USDA's MyPlate  ASA Prophylaxis  Lung CA Screening    Fady Penaloza MD  [facility]    Identified Health Risks:

## 2022-02-28 NOTE — PATIENT INSTRUCTIONS
Patient Education   Personalized Prevention Plan  You are due for the preventive services outlined below.  Your care team is available to assist you in scheduling these services.  If you have already completed any of these items, please share that information with your care team to update in your medical record.  Health Maintenance Due   Topic Date Due     Kidney Microalbumin Urine Test  Never done     Hepatitis C Screening  Never done     Zoster (Shingles) Vaccine (2 of 3) 03/12/2010     AORTIC ANEURYSM SCREENING (SYSTEM ASSIGNED)  Never done     Hemoglobin  01/13/2016     Cholesterol Lab  01/23/2021     Basic Metabolic Panel  01/25/2022     FALL RISK ASSESSMENT  01/25/2022       Understanding USDA MyPlate  The USDA has guidelines to help you make healthy food choices. These are called MyPlate. MyPlate shows the food groups that make up healthy meals using the image of a place setting. Before you eat, think about the healthiest choices for what to put on your plate or in your cup or bowl. To learn more about building a healthy plate, visit www.choosemyplate.gov.    The food groups    Fruits. Any fruit or 100% fruit juice counts as part of the Fruit Group. Fruits may be fresh, canned, frozen, or dried, and may be whole, cut-up, or pureed. Make 1/2 of your plate fruits and vegetables.    Vegetables. Any vegetable or 100% vegetable juice counts as a member of the Vegetable Group. Vegetables may be fresh, frozen, canned, or dried. They can be served raw or cooked and may be whole, cut-up, or mashed. Make 1/2 of your plate fruits and vegetables.    Grains. All foods made from grains are part of the Grains Group. These include wheat, rice, oats, cornmeal, and barley. Grains are often used to make foods such as bread, pasta, oatmeal, cereal, tortillas, and grits. Grains should be no more than 1/4 of your plate. At least half of your grains should be whole grains.    Protein. This group includes meat, poultry, seafood,  beans and peas, eggs, processed soy products (such as tofu), nuts (including nut butters), and seeds. Make protein choices no more than 1/4 of your plate. Meat and poultry choices should be lean or low fat.    Dairy. The Dairy Group includes all fluid milk products and foods made from milk that contain calcium, such as yogurt and cheese. (Foods that have little calcium, such as cream, butter, and cream cheese, are not part of this group.) Most dairy choices should be low-fat or fat-free.    Oils. Oils aren't a food group, but they do contain essential nutrients. However it's important to watch your intake of oils. These are fats that are liquid at room temperature. They include canola, corn, olive, soybean, vegetable, and sunflower oil. Foods that are mainly oil include mayonnaise, certain salad dressings, and soft margarines. You likely already get your daily oil allowance from the foods you eat.  Things to limit  Eating healthy also means limiting these things in your diet:       Salt (sodium). Many processed foods have a lot of sodium. To keep sodium intake down, eat fresh vegetables, meats, poultry, and seafood when possible. Purchase low-sodium, reduced-sodium, or no-salt-added food products at the store. And don't add salt to your meals at home. Instead, season them with herbs and spices such as dill, oregano, cumin, and paprika. Or try adding flavor with lemon or lime zest and juice.    Saturated fat. Saturated fats are most often found in animal products such as beef, pork, and chicken. They are often solid at room temperature, such as butter. To reduce your saturated fat intake, choose leaner cuts of meat and poultry. And try healthier cooking methods such as grilling, broiling, roasting, or baking. For a simple lower-fat swap, use plain nonfat yogurt instead of mayonnaise when making potato salad or macaroni salad.    Added sugars. These are sugars added to foods. They are in foods such as ice cream,  candy, soda, fruit drinks, sports drinks, energy drinks, cookies, pastries, jams, and syrups. Cut down on added sugars by sharing sweet treats with a family member or friend. You can also choose fruit for dessert, and drink water or other unsweetened beverages.     [employer] last reviewed this educational content on 6/1/2020 2000-2021 [employer]. All rights reserved. This information is not intended as a substitute for professional medical care. Always follow your healthcare professional's instructions.          Urinary Incontinence (Male)    Urinary incontinence means not being able to control the release of urine from the bladder.   Causes  Common causes of urinary incontinence in men include:    Infection    Certain medicines    Aging    Poor pelvic muscle tone    Bladder spasms    Obesity    Trouble urinating and fully emptying the bladder (urinary retention)  Other things that can cause incontinence are:     Nervous system diseases    Diabetes    Sleep apnea    Urinary tract infections    Prostate surgery    Pelvic injury  Constipation and smoking have also been identified as risk factors.   Symptoms    Urge incontinence (overactive bladder). This is a sudden urge to urinate. It occurs even though there may not be much urine in the bladder. The need to urinate often during the night is common. It's due to bladder spasms.    Stress incontinence. This is urine leakage that you can't control. It can occur with sneezing, coughing, and other actions that put stress on the bladder.    Treatment  Treatment depends on what is causing the condition. Bladder infections are treated with antibiotics. Urinary retention is treated with a bladder catheter.   Home care  Follow these guidelines when caring for yourself at home:    Don't have any foods and drinks that may irritate the bladder. This includes:  ? Chocolate  ? Alcohol  ? Caffeine  ? Carbonated drinks  ? Acidic fruits and juices    Limit fluids to 6  to 8 cups a day.    Lose weight if you are overweight. This will reduce your symptoms.    If advised, do regular pelvic muscle-strengthening exercises such as Kegel exercises.    If needed, wear absorbent pads to catch urine. Change the pads often. This is for good hygiene and to prevent skin and bladder infections.    Bathe daily for good hygiene.    If an antibiotic was prescribed to treat a bladder infection, take it until it's finished. Keep taking it even if you are feeling better. This is to make sure your infection has cleared.    If a catheter was left in place, keep bacteria from getting into the collection bag. Don't disconnect the catheter from the collection bag.    Use a leg band to secure the catheter drainage tube, so it does not pull on the catheter. Drain the collection bag when it becomes full. To do this, use the drain spout at the bottom of the bag. Don't disconnect the bag from the catheter.    Don't pull on or try to remove a catheter. The catheter must be removed by a healthcare provider.    If you smoke, stop. Ask your provider for help if you can't do this on your own.  Follow-up care  Follow up with your healthcare provider, or as advised.  When to get medical advice  Call your healthcare provider right away if any of these occur:    Fever over 100.4 F (38 C), or as directed by your provider    Bladder pain or fullness    Belly swelling, nausea, or vomiting    Back pain    Weakness, dizziness, or fainting    If a catheter was left in place, return if:  ? The catheter falls out  ? The catheter stops draining for 6 hours  ? Your urine gets cloudy or smells bad  [employer] last reviewed this educational content on 1/1/2020 2000-2021 [employer]. All rights reserved. This information is not intended as a substitute for professional medical care. Always follow your healthcare professional's instructions.

## 2022-02-28 NOTE — NURSING NOTE
"Chief Complaint   Patient presents with     Physical     IS FASTING       Initial /74   Pulse 62   Temp 97.6  F (36.4  C)   Resp 16   Ht 1.956 m (6' 5\")   Wt 103.7 kg (228 lb 9.6 oz)   SpO2 100%   BMI 27.11 kg/m   Estimated body mass index is 27.11 kg/m  as calculated from the following:    Height as of this encounter: 1.956 m (6' 5\").    Weight as of this encounter: 103.7 kg (228 lb 9.6 oz).  Medication Reconciliation: complete.  FOOD SECURITY SCREENING QUESTIONS  Hunger Vital Signs:  Within the past 12 months we worried whether our food would run out before we got money to buy more. Never  Within the past 12 months the food we bought just didn't last and we didn't have money to get more. Never  Monica Sullivan LPN 2/28/2022 8:49 AM      Monica Sullivan LPN  "

## 2022-03-16 ENCOUNTER — OFFICE VISIT (OUTPATIENT)
Dept: UROLOGY | Facility: OTHER | Age: 72
End: 2022-03-16
Attending: FAMILY MEDICINE
Payer: COMMERCIAL

## 2022-03-16 VITALS
RESPIRATION RATE: 16 BRPM | OXYGEN SATURATION: 99 % | DIASTOLIC BLOOD PRESSURE: 88 MMHG | HEART RATE: 67 BPM | WEIGHT: 232.2 LBS | SYSTOLIC BLOOD PRESSURE: 138 MMHG | BODY MASS INDEX: 27.53 KG/M2

## 2022-03-16 DIAGNOSIS — R97.20 ELEVATED PROSTATE SPECIFIC ANTIGEN (PSA): ICD-10-CM

## 2022-03-16 PROCEDURE — G0463 HOSPITAL OUTPT CLINIC VISIT: HCPCS

## 2022-03-16 PROCEDURE — 99214 OFFICE O/P EST MOD 30 MIN: CPT | Performed by: UROLOGY

## 2022-03-16 ASSESSMENT — PAIN SCALES - GENERAL: PAINLEVEL: NO PAIN (0)

## 2022-03-16 NOTE — NURSING NOTE
Chief Complaint   Patient presents with     Follow Up     elevated PSA    Patient presents to the clinic today for a follow up for an elevated PSA     Review of Systems:    Weight loss:    No     Recent fever/chills:  No   Night sweats:   No  Current skin rash:  No   Recent hair loss:  No  Heat intolerance:  No   Cold intolerance:  No  Chest pain:   No   Palpitations:   No  Shortness of breath:  No   Wheezing:   No  Constipation:    No   Diarrhea:   No   Nausea:   No   Vomiting:   No   Kidney/side pain:  No   Back pain:   No  Frequent headaches:  No   Dizziness:     No  Leg swelling:   No   Calf pain:    No        Medication Reconciliation: completed   Nilam Mayfield LPN  3/16/2022 2:03 PM

## 2022-03-16 NOTE — PROGRESS NOTES
Type of Visit  Established    Chief Complaint  BPH with retention    HPI  Mr. Samaniego is a 69 year old male w/h/o negative TRUS biopsy follows up with an increasing PSA.  The patient underwent TURP for BPH with urinary retention almost 3 years ago.  The patient was catheter dependent prior to surgery and became catheter free following TURP.  He also has a history of elevated PSAs and undergoes annual PSA checks.  His PSA has been steadily increasing for many years.  Most recently it was greater than 8 so he was referred by his PCP for evaluation.  The patient reports no new symptoms such as unintentional weight loss, bone or pelvic pain.  He has no family history of prostate cancer.      Review of Systems  I reviewed the ROS with the patient.    Nursing Notes:   Nilam Mayfield LPN  3/16/2022  2:05 PM  Signed  Chief Complaint   Patient presents with     Follow Up     elevated PSA    Patient presents to the clinic today for a follow up for an elevated PSA     Review of Systems:    Weight loss:    No     Recent fever/chills:  No   Night sweats:   No  Current skin rash:  No   Recent hair loss:  No  Heat intolerance:  No   Cold intolerance:  No  Chest pain:   No   Palpitations:   No  Shortness of breath:  No   Wheezing:   No  Constipation:    No   Diarrhea:   No   Nausea:   No   Vomiting:   No   Kidney/side pain:  No   Back pain:   No  Frequent headaches:  No   Dizziness:     No  Leg swelling:   No   Calf pain:    No        Medication Reconciliation: completed   Nilam Mayfield LPN  3/16/2022 2:03 PM     Physical Exam  Vitals:    03/16/22 1406   BP: 138/88   BP Location: Right arm   Patient Position: Sitting   Cuff Size: Adult Large   Pulse: 67   Resp: 16   SpO2: 99%   Weight: 105.3 kg (232 lb 3.2 oz)   Constitutional: NAD, WDWN.  Cardiovascular: Regular rate.  Pulmonary/Chest: Respirations are even and non-labored bilaterally.  Abdominal: Soft. No distension, tenderness, masses or guarding. No CVA tenderness.  Extremities:  JUANI x 4, Warm. No clubbing.  No cyanosis.    Skin: Pink, warm and dry.  No rashes noted.  Genitourinary: nonpalpable bladder    Labs  Results for JAZMINE GOMEZ (MRN [id number]) as of 3/16/2022 14:12   1/25/2021 11:07 2/28/2022 09:32   PSA 6.374 (H) 8.35 (H)     Results for JAZMINE GOMEZ (MRN [id number]) as of 12/12/2019 10:21   1/23/2019 12:21 12/12/2019 07:57   PSA 6.718 (H) 6.534 (H)     Results for JAZMINE GOMEZ (MRN [id number]) as of 12/12/2019 10:21   7/29/2014 10:05 1/13/2015 12:08 1/13/2016 10:42 1/3/2017 10:04 1/24/2018 09:33   PSA Total  5.020 (H) 4.440 (H) 5.843 (H) 4.527 (H) 5.505 (H)     Results for JAZMINE GOMEZ (MRN [id number]) as of 12/12/2019 10:21   3/12/2013 08:54   PSA  3.75     Results for JAZMINE GOMEZ (MRN [id number]) as of 12/12/2019 10:26   12/26/2013 10:23 1/13/2015 11:46 1/13/2016 10:29 1/3/2017 09:47 1/24/2018 10:59 1/23/2019 12:21 7/25/2019 14:49   Creatinine 1.66 (H) 1.52 (H) 1.51 (H) 1.41 (H) 1.78 (H) 1.48 (H) 1.67 (H)     Post-Void Residual  A post-void residual was measured by ultrasonic bladder scanner.  40 mL (previously recorded)  17 mL post-op  Catheter dependent prior to TURP    Assessment  Mr. Gomez is a 69 year old male w/h/o negative TRUS biopsy follows up with an increasing PSA.  We discussed his risks of prostate cancer at length.  Using the  PT prostate cancer risk calculator we calculated his risks of prostate cancer based on the most recent PSA as well as adjusting the scenario to a PSA of 4.    We discussed additional options for work-up including serial PSA monitoring, urine testing in the form of Select MDx, repeat ultrasound-guided prostate biopsy, prostate MRI with possible MRI guided biopsy.    I explained to the patient that an elevated PSA is a marker of risk of prostate cancer and a prostate biopsy would be the next step in diagnosis.    I explained that sampling error can occur with any biopsy and there is a risk of potentially missing a cancer that may be  present.    PSA 4.2  PSA 8.4  8%   14%  Risk of high grade cancer detected on biopsy  14%   16%  Risk of low grade cancer detected on biopsy  78%   70%  Chance of benign biopsy    I did point out that the current PSA does result in a doubling of relative risk of high-grade cancer however the actual increases in absolute increase of 6%.    We spent a great deal of time discussing the potential harms associated with additional testing and work-up and possible biopsy versus the potential harms of passively managing an elevated PSA.    He understands that there are risks and benefits both with serial PSAs as well as proceeding with an MRI of his prostate.    Following this conversation we elected to proceed with a repeat PSA in 6 months and if the trend persists we will likely get an MRI of his prostate at that time.    Plan  Follow-up in 6 months with a PSA prior          A total of 30 minutes was spent with the patient, reviewing records, tests, ordering medications, tests or procedures, counseling regarding the above described medical concern, recommendations regarding management and documenting clinical information in the EHR.

## 2022-04-11 ENCOUNTER — OFFICE VISIT (OUTPATIENT)
Dept: FAMILY MEDICINE | Facility: OTHER | Age: 72
End: 2022-04-11
Attending: FAMILY MEDICINE
Payer: COMMERCIAL

## 2022-04-11 VITALS
SYSTOLIC BLOOD PRESSURE: 170 MMHG | DIASTOLIC BLOOD PRESSURE: 84 MMHG | TEMPERATURE: 96.5 F | OXYGEN SATURATION: 99 % | WEIGHT: 231.2 LBS | RESPIRATION RATE: 12 BRPM | HEART RATE: 56 BPM | BODY MASS INDEX: 27.42 KG/M2

## 2022-04-11 DIAGNOSIS — L03.114 CELLULITIS OF LEFT UPPER EXTREMITY: Primary | ICD-10-CM

## 2022-04-11 PROCEDURE — G0463 HOSPITAL OUTPT CLINIC VISIT: HCPCS

## 2022-04-11 PROCEDURE — 99213 OFFICE O/P EST LOW 20 MIN: CPT | Performed by: FAMILY MEDICINE

## 2022-04-11 RX ORDER — SULFAMETHOXAZOLE/TRIMETHOPRIM 800-160 MG
1 TABLET ORAL 2 TIMES DAILY
Qty: 20 TABLET | Refills: 0 | Status: SHIPPED | OUTPATIENT
Start: 2022-04-11 | End: 2022-04-21

## 2022-04-11 ASSESSMENT — PAIN SCALES - GENERAL: PAINLEVEL: NO PAIN (1)

## 2022-04-11 NOTE — PROGRESS NOTES
Assessment & Plan     1. Cellulitis of left upper extremity  With close proximity to recently repaired laceration from fall against step.  Seroma, Bleeding, Infection,  Inflammatory response all in differential.  Discussed R/B/O of treating as infection and considering imaging if not improved.  No area for I&D with more indurated feeling.   Abx therapy with Bactrim.  Continue epsom salt soaks/or hot pack to area prn.  Follow up with PCP if not resolved for likely need of imaging.  - sulfamethoxazole-trimethoprim (BACTRIM DS) 800-160 MG tablet; Take 1 tablet by mouth in the morning and 1 tablet in the evening. Do all this for 10 days.  Dispense: 20 tablet; Refill: 0    Genet Lyons DO  [facility]      Subjective   Anuel is a 71 year old who presents for the following health issues:    HPI     Skin Lesion/swelling  Onset/Duration: worsening over the last 1 week  Description  Location: L forearm  Border description: inflamed  Character: round  Itching: no  Bleeding:  no  Intensity:  moderate  Progression of Symptoms:  worsening and constant  Accompanying signs and symptoms:   Bleeding: no  Scaling: no  Excessive sun exposure/tanning: no  Sunscreen used: no  History:  Had laceration on same area early Feb; stitches out 10 days later.  Now noticing more swelling to area.  No new injury/fall/trauma.  Works out regularly.  Precipitating or alleviating factors: NA  Therapies tried and outcome: none    BP elevated; has a monitor at home.  [facility] once a year as well.  Had recent physical with Dr. Penaloza.    Review of Systems   CONSTITUTIONAL: NEGATIVE for fever, chills, change in weight  ENT/MOUTH: NEGATIVE for ear, mouth and throat problems  RESP: NEGATIVE for significant cough or SOB  CV: NEGATIVE for chest pain, palpitations or peripheral edema      Objective    BP (!) 170/84 (BP Location: Right arm, Patient Position: Sitting, Cuff Size: Adult Large)   Pulse 56   Temp (!) 96.5  F (35.8  C) (Tympanic)    Resp 12   Wt 104.9 kg (231 lb 3.2 oz)   SpO2 99%   BMI 27.42 kg/m    Body mass index is 27.42 kg/m .  Physical Exam   GENERAL: healthy, alert and no distress  SKIN: no suspicious rashes.  L shaped healed incisions from previous laceration.  Central area with swelling.  No area of fluctuance, somewhat indurated.    No results found for any visits on 04/11/22.

## 2022-04-11 NOTE — NURSING NOTE
"Chief Complaint   Patient presents with     Arm Pain     Left arm pain and lump under elbow     Patient presents to clinic today with left arm pain. Patient stated it starts at a lump under elbow and radiates down into hand. Did cut that spot open on 2/4/22 when fell on porch.      Initial BP (!) 170/84 (BP Location: Right arm, Patient Position: Sitting, Cuff Size: Adult Large)   Pulse 56   Temp (!) 96.5  F (35.8  C) (Tympanic)   Resp 12   Wt 104.9 kg (231 lb 3.2 oz)   SpO2 99%   BMI 27.42 kg/m   Estimated body mass index is 27.42 kg/m  as calculated from the following:    Height as of 2/28/22: 1.956 m (6' 5\").    Weight as of this encounter: 104.9 kg (231 lb 3.2 oz).  Medication Reconciliation: Completed     Advanced Care Directive Reviewed    Gerardo Corley LPN  "

## 2022-04-21 DIAGNOSIS — I10 ESSENTIAL HYPERTENSION: ICD-10-CM

## 2022-04-21 DIAGNOSIS — E03.9 HYPOTHYROIDISM, UNSPECIFIED TYPE: ICD-10-CM

## 2022-04-21 RX ORDER — HYDROCHLOROTHIAZIDE 25 MG/1
TABLET ORAL
Qty: 90 TABLET | Refills: 3 | Status: SHIPPED | OUTPATIENT
Start: 2022-04-21 | End: 2023-01-26 | Stop reason: ALTCHOICE

## 2022-04-21 RX ORDER — LEVOTHYROXINE SODIUM 150 UG/1
TABLET ORAL
Qty: 90 TABLET | Refills: 3 | Status: SHIPPED | OUTPATIENT
Start: 2022-04-21 | End: 2023-02-28

## 2022-04-21 NOTE — TELEPHONE ENCOUNTER
"Requested Prescriptions   Pending Prescriptions Disp Refills     levothyroxine (SYNTHROID/LEVOTHROID) 150 MCG tablet [Pharmacy Med Name: L-THYROXINE (SYNTHROID) TABS 150MCG] 90 tablet 3     Sig: TAKE 1 TABLET DAILY WITH BREAKFAST       Thyroid Protocol Passed - 4/21/2022  7:14 AM        Passed - Patient is 12 years or older        Passed - Recent (12 mo) or future (30 days) visit within the authorizing provider's specialty     Patient has had an office visit with the authorizing provider or a provider within the authorizing providers department within the previous 12 mos or has a future within next 30 days. See \"Patient Info\" tab in inbasket, or \"Choose Columns\" in Meds & Orders section of the refill encounter.              Passed - Medication is active on med list        Passed - Normal TSH on file in past 12 months     Recent Labs   Lab Test 02/28/22  0932   TSH 1.45           Last Written Prescription Date:  2/9/22  Last Fill Quantity: 90,   # refills: 0    Next 5 appointments (look out 90 days)    Apr 25, 2022  1:45 PM  Nurse Only with  INJECTION NURSE  [facility] ([facility] ) [address]  [place] [address] [phone]               hydrochlorothiazide (HYDRODIURIL) 25 MG tablet [Pharmacy Med Name: HYDROCHLOROTHIAZIDE TABS 25MG] 90 tablet 3     Sig: TAKE 1 TABLET DAILY       Diuretics (Including Combos) Protocol Failed - 4/21/2022  7:14 AM        Failed - Blood pressure under 140/90 in past 12 months     BP Readings from Last 3 Encounters:   04/11/22 (!) 170/84   03/16/22 138/88   02/28/22 136/74                 Failed - Normal serum creatinine on file in past 12 months     Recent Labs   Lab Test 02/28/22  0932   CR 1.65*              Passed - Recent (12 mo) or future (30 days) visit within the authorizing provider's specialty     Patient has had an office visit with the authorizing provider or a provider within the authorizing " "providers department within the previous 12 mos or has a future within next 30 days. See \"Patient Info\" tab in inbasket, or \"Choose Columns\" in Meds & Orders section of the refill encounter.              Passed - Medication is active on med list        Passed - Patient is age 18 or older        Passed - Normal serum potassium on file in past 12 months     Recent Labs   Lab Test 02/28/22  0932   POTASSIUM 4.2                    Passed - Normal serum sodium on file in past 12 months     Recent Labs   Lab Test 02/28/22  0932              Last Written Prescription Date:  2/9/22  Last Fill Quantity: 90,   # refills: 0      Last Office Visit: 4/11/22 Serena  Future Office visit:    Next 5 appointments (look out 90 days)    Apr 25, 2022  1:45 PM  Nurse Only with GH INJECTION NURSE  [facility] ([facility] ) [address]  [place] [address]  [phone]         Routing refill request to provider for review/approval:  Brenda J. Goodell, RN on 4/21/2022 at 2:54 PM        "

## 2022-04-25 ENCOUNTER — ALLIED HEALTH/NURSE VISIT (OUTPATIENT)
Dept: FAMILY MEDICINE | Facility: OTHER | Age: 72
End: 2022-04-25
Attending: FAMILY MEDICINE
Payer: COMMERCIAL

## 2022-04-25 DIAGNOSIS — Z23 NEED FOR VACCINATION: Primary | ICD-10-CM

## 2022-04-25 PROCEDURE — 91305 COVID-19,PF,PFIZER (12+ YRS): CPT

## 2022-04-25 NOTE — PROGRESS NOTES
Immunization Documentation  Verified patient's first and last name, and . Stated reason for visit today is to receive COVID vaccine. Accompained to clinic visit with SELF. Denied any concerns with previous immunizations. Allergies reviewed. VIS handout(s) reviewed and given to take home. VACCINE prepared and administered per standing order. Administration documented in IMMUNIZATIONS (see flowsheet and order for further information).  Instructed to wait in lobby for 15 minutes post-injection and notify staff immediately of any reaction.     Yun Rees RN ....................  2022   1:26 PM

## 2022-04-26 ENCOUNTER — OFFICE VISIT (OUTPATIENT)
Dept: FAMILY MEDICINE | Facility: OTHER | Age: 72
End: 2022-04-26
Attending: FAMILY MEDICINE
Payer: COMMERCIAL

## 2022-04-26 VITALS
HEART RATE: 73 BPM | SYSTOLIC BLOOD PRESSURE: 154 MMHG | WEIGHT: 230.6 LBS | DIASTOLIC BLOOD PRESSURE: 80 MMHG | RESPIRATION RATE: 20 BRPM | TEMPERATURE: 97.7 F | OXYGEN SATURATION: 98 % | BODY MASS INDEX: 27.35 KG/M2

## 2022-04-26 DIAGNOSIS — I10 ESSENTIAL HYPERTENSION: Primary | ICD-10-CM

## 2022-04-26 DIAGNOSIS — L98.9 SKIN LESION: ICD-10-CM

## 2022-04-26 PROCEDURE — G0463 HOSPITAL OUTPT CLINIC VISIT: HCPCS

## 2022-04-26 PROCEDURE — 99213 OFFICE O/P EST LOW 20 MIN: CPT | Performed by: FAMILY MEDICINE

## 2022-04-26 RX ORDER — LOSARTAN POTASSIUM 50 MG/1
50 TABLET ORAL DAILY
Qty: 90 TABLET | Refills: 3 | Status: SHIPPED | OUTPATIENT
Start: 2022-04-26 | End: 2022-04-26

## 2022-04-26 RX ORDER — LOSARTAN POTASSIUM 50 MG/1
50 TABLET ORAL DAILY
Qty: 90 TABLET | Refills: 3 | Status: SHIPPED | OUTPATIENT
Start: 2022-04-26 | End: 2023-01-26

## 2022-04-26 ASSESSMENT — PATIENT HEALTH QUESTIONNAIRE - PHQ9
SUM OF ALL RESPONSES TO PHQ QUESTIONS 1-9: 1
SUM OF ALL RESPONSES TO PHQ QUESTIONS 1-9: 1
10. IF YOU CHECKED OFF ANY PROBLEMS, HOW DIFFICULT HAVE THESE PROBLEMS MADE IT FOR YOU TO DO YOUR WORK, TAKE CARE OF THINGS AT HOME, OR GET ALONG WITH OTHER PEOPLE: NOT DIFFICULT AT ALL

## 2022-04-26 ASSESSMENT — PAIN SCALES - GENERAL: PAINLEVEL: NO PAIN (0)

## 2022-04-26 NOTE — NURSING NOTE
Patient here for follow up infection near left elbow after his sutures were removed and he was treated with 10 days of antibiotics. Medication Reconciliation: complete.    Julia Wright LPN  4/26/2022 1:17 PM

## 2022-04-27 ASSESSMENT — PATIENT HEALTH QUESTIONNAIRE - PHQ9
10. IF YOU CHECKED OFF ANY PROBLEMS, HOW DIFFICULT HAVE THESE PROBLEMS MADE IT FOR YOU TO DO YOUR WORK, TAKE CARE OF THINGS AT HOME, OR GET ALONG WITH OTHER PEOPLE: NOT DIFFICULT AT ALL
SUM OF ALL RESPONSES TO PHQ QUESTIONS 1-9: 1

## 2022-04-27 NOTE — PROGRESS NOTES
Assessment & Plan     Essential hypertension  Refill.  We will increase his Cozaar to 50 mg a day  - losartan (COZAAR) 50 MG tablet; Take 1 tablet (50 mg) by mouth daily    Skin lesion  I doubt that there is infection present.  Likely a sterile fluid collection.  Recommended observing for now.  Possibly hematoma.  Follow-up if getting worse                   No follow-ups on file.    Mark Ferguson MD  [facility]    Subjective   Anuel is a 71 year old who presents for the following health issues possible arm infection     Patient arrives here for possible arm infection.  He had a flap laceration that was sutured.  He was put on antibiotics because of infection.  He arrives here because of increasing swelling antibiotics completed 2 weeks ago    History of Present Illness       Mental Health Follow-up:                    Today's PHQ-9         PHQ-9 Total Score: 1  PHQ-9 Q9 Thoughts of better off dead/self-harm past 2 weeks :   (P) Not at all    How difficult have these problems made it for you to do your work, take care of things at home, or get along with other people: Not difficult at all        Reason for visit:  Pain in left arm  Symptom onset:  1-2 weeks ago  Symptoms include:  Pain in left forearm  Symptom intensity:  Moderate  Symptom progression:  Staying the same  Had these symptoms before:  No  What makes it worse:  No  What makes it better:  No    He eats 0-1 servings of fruits and vegetables daily.He consumes 1 sweetened beverage(s) daily.He exercises with enough effort to increase his heart rate 30 to 60 minutes per day.  He exercises with enough effort to increase his heart rate 3 or less days per week.   He is taking medications regularly.             Review of Systems         Objective    BP (!) 154/80   Pulse 73   Temp 97.7  F (36.5  C)   Resp 20   Wt 104.6 kg (230 lb 9.6 oz)   SpO2 98%   BMI 27.35 kg/m    Body mass index is 27.35 kg/m .  Physical Exam  Skin:      General: Skin is warm.      Comments: Patient has a healing laceration.  Little flexion underneath but nontender that would suggest an abscess.   Neurological:      Mental Status: He is alert.

## 2022-07-20 DIAGNOSIS — R97.20 ELEVATED PROSTATE SPECIFIC ANTIGEN (PSA): Primary | ICD-10-CM

## 2022-07-20 NOTE — PROGRESS NOTES
"Per last office visit  3/16/22 with Art Liu MD \"Plan  Follow-up in 6 months with a PSA prior\"        Orders Placed    "

## 2022-09-17 ENCOUNTER — HEALTH MAINTENANCE LETTER (OUTPATIENT)
Age: 72
End: 2022-09-17

## 2022-09-22 ENCOUNTER — LAB (OUTPATIENT)
Dept: LAB | Facility: OTHER | Age: 72
End: 2022-09-22
Attending: UROLOGY
Payer: COMMERCIAL

## 2022-09-22 ENCOUNTER — OFFICE VISIT (OUTPATIENT)
Dept: UROLOGY | Facility: OTHER | Age: 72
End: 2022-09-22
Attending: UROLOGY
Payer: COMMERCIAL

## 2022-09-22 VITALS
SYSTOLIC BLOOD PRESSURE: 138 MMHG | BODY MASS INDEX: 26.35 KG/M2 | HEART RATE: 62 BPM | DIASTOLIC BLOOD PRESSURE: 84 MMHG | OXYGEN SATURATION: 98 % | RESPIRATION RATE: 16 BRPM | WEIGHT: 222.22 LBS

## 2022-09-22 DIAGNOSIS — R97.20 ELEVATED PROSTATE SPECIFIC ANTIGEN (PSA): ICD-10-CM

## 2022-09-22 DIAGNOSIS — R31.0 GROSS HEMATURIA: ICD-10-CM

## 2022-09-22 DIAGNOSIS — R31.0 GROSS HEMATURIA: Primary | ICD-10-CM

## 2022-09-22 LAB
CREAT SERPL-MCNC: 1.69 MG/DL (ref 0.7–1.3)
GFR SERPL CREATININE-BSD FRML MDRD: 42 ML/MIN/1.73M2
PSA SERPL-MCNC: 9.1 UG/L (ref 0–4)

## 2022-09-22 PROCEDURE — 36415 COLL VENOUS BLD VENIPUNCTURE: CPT | Mod: ZL

## 2022-09-22 PROCEDURE — 82565 ASSAY OF CREATININE: CPT | Mod: ZL

## 2022-09-22 PROCEDURE — G0463 HOSPITAL OUTPT CLINIC VISIT: HCPCS

## 2022-09-22 PROCEDURE — 99214 OFFICE O/P EST MOD 30 MIN: CPT | Performed by: UROLOGY

## 2022-09-22 PROCEDURE — 84153 ASSAY OF PSA TOTAL: CPT | Mod: ZL

## 2022-09-22 ASSESSMENT — PAIN SCALES - GENERAL: PAINLEVEL: NO PAIN (0)

## 2022-09-22 NOTE — NURSING NOTE
Chief Complaint   Patient presents with     Follow Up     6 month elevated PSA Follow up    Patient presents to the clinic today for a follow up for a 6 month elevated PSA    Review of Systems:    Weight loss:    No     Recent fever/chills:  No   Night sweats:   No  Current skin rash:  No   Recent hair loss:  No  Heat intolerance:  No   Cold intolerance:  No  Chest pain:   No   Palpitations:   No  Shortness of breath:  No   Wheezing:   No  Constipation:    No   Diarrhea:   No   Nausea:   No   Vomiting:   No   Kidney/side pain:  No   Back pain:   No  Frequent headaches:  No   Dizziness:     No  Leg swelling:   No   Calf pain:    No          Medication Reconciliation: completed   Nilam Mayfield LPN  9/22/2022 10:25 AM

## 2022-09-22 NOTE — PROGRESS NOTES
Type of Visit  Established    Chief Complaint  BPH with retention  Gross hematuria    HPI  Mr. Samaniego is a 72 year old male w/h/o -TRUS and TURP 3 years ago who follows up with an increasing PSA.    The patient underwent TURP for BPH with urinary retention 3 years ago.  The patient was catheter dependent prior to surgery and became catheter free following TURP.  He also has a history of elevated PSAs and undergoes annual PSA checks.  His PSA has been steadily increasing for many years.    The patient reports no new symptoms such as unintentional weight loss, bone or pelvic pain.  He has no family history of prostate cancer.    In addition he does complain of intermittent painless gross hematuria.  He was seen by his PCP this spring and underwent a urinalysis revealing no infection.  No significant blood was identified at that urinalysis.  However he continues to have intermittent episodes of self resolving gross hematuria.  He denies clots and dysuria at the time.  He has not undergone cross-sectional imaging.  He did undergo cystoscopy prior to the TURP 3 years ago.  He has not undergone cross-sectional imaging in the last 10 years.    Regarding the patient's elevated PSA.  He has had known elevated PSA for over a decade.  He underwent an ultrasound-guided prostate biopsy in 2013 which was negative.  His PSA at the time was around 5.  He has never undergone a prostate MRI.  He denies unintentional weight loss, bone or pelvic pain.      Review of Systems  I reviewed the ROS with the patient.    Nursing Notes:   Nilam Mayfield LPN  9/22/2022 10:26 AM  Signed  Chief Complaint   Patient presents with     Follow Up     6 month elevated PSA Follow up    Patient presents to the clinic today for a follow up for a 6 month elevated PSA    Review of Systems:    Weight loss:    No     Recent fever/chills:  No   Night sweats:   No  Current skin rash:  No   Recent hair loss:  No  Heat intolerance:  No   Cold  intolerance:  No  Chest pain:   No   Palpitations:   No  Shortness of breath:  No   Wheezing:   No  Constipation:    No   Diarrhea:   No   Nausea:   No   Vomiting:   No   Kidney/side pain:  No   Back pain:   No  Frequent headaches:  No   Dizziness:     No  Leg swelling:   No   Calf pain:    No          Medication Reconciliation: completed   Nilam MayfieldALEJANDRO  9/22/2022 10:25 AM     Physical Exam  Vitals:    09/22/22 1029   BP: 138/84   BP Location: Right arm   Patient Position: Sitting   Cuff Size: Adult Large   Pulse: 62   Resp: 16   SpO2: 98%   Weight: 100.8 kg (222 lb 3.5 oz)   Constitutional: NAD, WDWN.  Cardiovascular: Regular rate.  Pulmonary/Chest: Respirations are even and non-labored bilaterally.  Abdominal: Soft. No distension, tenderness, masses or guarding. No CVA tenderness.  Extremities: JUANI x 4, Warm. No clubbing.  No cyanosis.    Skin: Pink, warm and dry.  No rashes noted.  Genitourinary: nonpalpable bladder    Labs   02/28/22 09:32 09/22/22 08:26   PSA 8.35 (H) 9.10 (H)     Results for JAZMINE GOMEZ (MRN [id number]) as of 3/16/2022 14:12   1/25/2021 11:07   PSA 6.374 (H)     Results for PATRICIAJAZMINE (MRN [id number]) as of 12/12/2019 10:21   1/23/2019 12:21 12/12/2019 07:57   PSA 6.718 (H) 6.534 (H)     Results for PATRICIAJAZMINE (MRN [id number]) as of 12/12/2019 10:21   7/29/2014 10:05 1/13/2015 12:08 1/13/2016 10:42 1/3/2017 10:04 1/24/2018 09:33   PSA  5.020 (H) 4.440 (H) 5.843 (H) 4.527 (H) 5.505 (H)     Results for JAZMINE GOMEZ (MRN [id number]) as of 12/12/2019 10:21   3/12/2013 08:54   PSA  3.75      02/28/22 09:32   Color Urine Light Yellow   Appearance Urine Clear   Glucose Urine Negative   Bilirubin Urine Negative   Ketones Urine Negative   Specific Gravity Urine 1.017   pH Urine 6.0   Protein Albumin Urine 10  !   Urobilinogen mg/dL Normal   Nitrite Urine Negative   Blood Urine Negative   Leukocyte Esterase Urine Negative   WBC Urine 2   RBC Urine <1   Bacteria Urine Few !   Mucus Urine  Present !     Post-Void Residual  A post-void residual was measured by ultrasonic bladder scanner.  40 mL (previously recorded)  17 mL post-op  Catheter dependent prior to TURP    Assessment  Mr. Samaniego is a 72 year old male w/h/o negative TRUS biopsy follows up with an increasing PSA and gross hematuria.  We discussed his risks of prostate cancer at length.  Given the trajectory of his PSA I am recommending a prostate MRI.  We discussed that this may lead to a MRI guided prostate biopsy depending on the findings.    Next we discussed the episodes of gross hematuria he has been experiencing.  We certainly expect that this is due to the prior TURP and subsequent scar formation.  However he has not undergone cross-sectional imaging and has not undergone cystoscopy for 3 years.  We discussed the potential findings of a hematuria work-up including kidney or bladder tumors and kidney stones as well as the most common outcome of a normal work-up.    Plan  Prostate MRI and follow up after results are back.  CT Urogram and cystoscopy due to recurring gross hematuria (we suspect is prior TURP scar bleeding however he has never had a work up and TURP was 3 years ago)

## 2022-09-29 ENCOUNTER — HOSPITAL ENCOUNTER (OUTPATIENT)
Dept: MRI IMAGING | Facility: OTHER | Age: 72
Discharge: HOME | End: 2022-09-29
Attending: UROLOGY | Admitting: UROLOGY
Payer: COMMERCIAL

## 2022-09-29 DIAGNOSIS — R97.20 ELEVATED PROSTATE SPECIFIC ANTIGEN (PSA): ICD-10-CM

## 2022-09-29 PROCEDURE — A9575 INJ GADOTERATE MEGLUMI 0.1ML: HCPCS | Performed by: UROLOGY

## 2022-09-29 PROCEDURE — 255N000002 HC RX 255 OP 636: Performed by: UROLOGY

## 2022-09-29 PROCEDURE — 76377 3D RENDER W/INTRP POSTPROCES: CPT

## 2022-09-29 RX ORDER — GADOTERATE MEGLUMINE 376.9 MG/ML
20 INJECTION INTRAVENOUS ONCE
Status: COMPLETED | OUTPATIENT
Start: 2022-09-29 | End: 2022-09-29

## 2022-09-29 RX ADMIN — GADOTERATE MEGLUMINE 20 ML: 376.9 INJECTION INTRAVENOUS at 13:41

## 2022-10-07 ENCOUNTER — IMMUNIZATION (OUTPATIENT)
Dept: FAMILY MEDICINE | Facility: OTHER | Age: 72
End: 2022-10-07
Attending: FAMILY MEDICINE
Payer: COMMERCIAL

## 2022-10-07 DIAGNOSIS — Z23 HIGH PRIORITY FOR 2019-NCOV VACCINE: ICD-10-CM

## 2022-10-07 DIAGNOSIS — Z23 NEED FOR PROPHYLACTIC VACCINATION AND INOCULATION AGAINST INFLUENZA: ICD-10-CM

## 2022-10-07 PROCEDURE — 91312 COVID-19,PF,PFIZER BOOSTER BIVALENT: CPT

## 2022-10-07 PROCEDURE — G0008 ADMIN INFLUENZA VIRUS VAC: HCPCS

## 2022-10-07 PROCEDURE — 0124A COVID-19,PF,PFIZER BOOSTER BIVALENT: CPT

## 2022-10-17 ENCOUNTER — OFFICE VISIT (OUTPATIENT)
Dept: UROLOGY | Facility: OTHER | Age: 72
End: 2022-10-17
Attending: UROLOGY
Payer: COMMERCIAL

## 2022-10-17 ENCOUNTER — HOSPITAL ENCOUNTER (OUTPATIENT)
Dept: CT IMAGING | Facility: OTHER | Age: 72
Discharge: HOME | End: 2022-10-17
Attending: UROLOGY
Payer: COMMERCIAL

## 2022-10-17 VITALS
WEIGHT: 221 LBS | OXYGEN SATURATION: 98 % | HEART RATE: 61 BPM | BODY MASS INDEX: 26.21 KG/M2 | RESPIRATION RATE: 16 BRPM

## 2022-10-17 DIAGNOSIS — R31.0 GROSS HEMATURIA: Primary | ICD-10-CM

## 2022-10-17 DIAGNOSIS — R31.0 GROSS HEMATURIA: ICD-10-CM

## 2022-10-17 PROCEDURE — 99213 OFFICE O/P EST LOW 20 MIN: CPT | Mod: 25 | Performed by: UROLOGY

## 2022-10-17 PROCEDURE — 52000 CYSTOURETHROSCOPY: CPT | Performed by: UROLOGY

## 2022-10-17 PROCEDURE — G0463 HOSPITAL OUTPT CLINIC VISIT: HCPCS | Mod: 25

## 2022-10-17 PROCEDURE — 74178 CT ABD&PLV WO CNTR FLWD CNTR: CPT

## 2022-10-17 PROCEDURE — 250N000011 HC RX IP 250 OP 636: Performed by: UROLOGY

## 2022-10-17 RX ORDER — IOPAMIDOL 755 MG/ML
115 INJECTION, SOLUTION INTRAVASCULAR ONCE
Status: COMPLETED | OUTPATIENT
Start: 2022-10-17 | End: 2022-10-17

## 2022-10-17 RX ADMIN — IOPAMIDOL 115 ML: 755 INJECTION, SOLUTION INTRAVENOUS at 08:38

## 2022-10-17 ASSESSMENT — PAIN SCALES - GENERAL: PAINLEVEL: NO PAIN (0)

## 2022-10-17 NOTE — PROGRESS NOTES
Type of Visit  Established    Chief Complaint  BPH with retention  Gross hematuria    HPI  Mr. Samaniego is a 72 year old male w/h/o -TRUS and TURP 3 years ago who follows up with gross hematuria and an increasing PSA.    The patient developed gross hematuria intermittently over the past 2 months.  At the last visit we like to proceed with a work-up.  He underwent a CT urogram prior to this visit and follows up for cystoscopy.    In addition the patient has an elevated PSA.  He has had a known enlarged prostate but never undergone objective measurement.  He underwent an ultrasound-guided prostate biopsy in 2013 which was negative and at that time his PSA was around 5.  He underwent a dedicated prostate MRI prior to this visit.      Review of Systems  I reviewed the ROS with the patient.    Weight loss:    No     Recent fever/chills:  No   Night sweats:   No  Current skin rash:  No   Recent hair loss:  No  Heat intolerance:  No   Cold intolerance:  No  Chest pain:   No   Palpitations:   No  Shortness of breath:  No   Wheezing:   No  Constipation:    No   Diarrhea:   No   Nausea:   No   Vomiting:   No   Kidney/side pain:  No   Back pain:   No  Frequent headaches:  No   Dizziness:     No  Leg swelling:   No   Calf pain:    No    Nursing Notes:   Natalie Villavicencio LPN  10/17/2022  8:45 AM  Signed  Patient positioned in supine position, perineum area prepped with chlorhexidene Gluconate and patient draped per sterile technique. Per verbal order read back by Art Liu MD, Urojet 10mL 2% lidocaine jelly to be instilled into urethra.  Urojet- 10ml 2% Lidocaine jelly instilled into the urethra.    Urojet 2%  Lot#: [id number]  Expiration date: 5/2024  : [employer]  NDC: [address]-6    [place] Protocol    A. Pre-procedure verification complete Yes  1-relevant information / documentation available, reviewed and properly matched to the patient; 2-consent accurate and complete, 3-equipment and supplies  available    B. Site marking complete N/A  Site marked if not in continuous attendance with patient    C. TIME OUT completed Yes  Time Out was conducted just prior to starting procedure to verify the eight required elements: 1-patient identity, 2-consent accurate and complete, 3-position, 4-correct side/site marked (if applicable), 5-procedure, 6-relevant images / results properly labeled and displayed (if applicable), 7-antibiotics / irrigation fluids (if applicable), 8-safety precautions.    After procedure perineum area rinsed. Discharge instructions reviewed with patient. Patient verbalized understanding of discharge instructions and discharged ambulatory.  Natalie Villavicencio LPN..................10/17/2022  8:43 AM         Physical Exam  Vitals:    10/17/22 0857   Pulse: 61   Resp: 16   SpO2: 98%   Weight: 100.2 kg (221 lb)      Constitutional: NAD, WDWN.  Cardiovascular: Regular rate.  Pulmonary/Chest: Respirations are even and non-labored bilaterally.  Abdominal: Soft. No distension, tenderness, masses or guarding. No CVA tenderness.  Extremities: JUANI x 4, Warm. No clubbing.  No cyanosis.    Skin: Pink, warm and dry.  No rashes noted.  Genitourinary: nonpalpable bladder    ^^^^^^^^^^^^^^^^^^^^^^^^^^^^^^^^^^^^^^^^^^^^^^^^^^^^^^^^^^^^^^^    Preprocedure diagnosis  Hematuria    Postprocedure diagnosis  Hematuria    Procedure  Flexible Cystourethroscopy    Surgeon  Art Liu MD    Anesthesia  2% lidocaine jelly intraurethrally    Complications  None    Indications  72 year old male undergoing a flexible cystoscopy for the above mentioned indications.    Findings  Cystoscopic findings included a normal anterior urethra.    Very large prostate with patent prostatic urethra.  The bladder appeared to be normal capacity.    There were no tumors, stones or foreign bodies.    The orifices were slit-shaped and in their normal location.    Procedure  The patient was placed in supine position and prepped and draped in  sterile fashion with lidocaine jelly per urethra for anesthesia.    I passed a lubricated 14F flexible cystoscope through the penile urethra and into the bladder and the bladder was completely visualized.  The cystoscope was retroflexed and the bladder neck and prostate visualized.    The cystoscope was slowly withdrawn while visualizing the urethra and the procedure terminated.    The patient tolerated the procedure well.      ^^^^^^^^^^^^^^^^^^^^^^^^^^^^^^^^^^^^^^^^^^^^^^^^^^^^^^^^^^^^^^^    Labs   02/28/22 09:32 09/22/22 08:26   PSA 8.35 (H) 9.10 (H)     Results for JAZMINE GOMEZ (MRN [id number]) as of 3/16/2022 14:12   1/25/2021 11:07   PSA 6.374 (H)     Results for JAZMINE GOMEZ (MRN [id number]) as of 12/12/2019 10:21   1/23/2019 12:21 12/12/2019 07:57   PSA 6.718 (H) 6.534 (H)     Results for JAZMINE GOMEZ (MRN [id number]) as of 12/12/2019 10:21   7/29/2014 10:05 1/13/2015 12:08 1/13/2016 10:42 1/3/2017 10:04 1/24/2018 09:33   PSA  5.020 (H) 4.440 (H) 5.843 (H) 4.527 (H) 5.505 (H)     Results for JAZMINE GOMEZ (MRN [id number]) as of 12/12/2019 10:21   3/12/2013 08:54   PSA  3.75     Post-Void Residual  A post-void residual was measured by ultrasonic bladder scanner.  40 mL (previously recorded)  17 mL post-op  Catheter dependent prior to TURP    Imaging  Prostate MR  9/29/2022  IMPRESSION:  Single intermediate suspicion right transitional zone lesion.     Assessment: PI-RADS 3    Size: 182g    ^^^^^^^^^^^^^^^^^^^^^^^^    CTU  10/17/2022  Report pending, no kidney stones or hydronephrosis  There is a right-sided renal lesion that does not enhance    Assessment  Mr. Gomez is a 72 year old male follows up to undergo cystoscopy and review of CT urogram to complete hematuria work-up as well as review a prostate MRI which was obtained due to rising PSA.    CT urogram was reviewed and negative.  Cystoscopy was negative.    We reviewed his prostate MRI which was notable for a low-grade lesion, PI-RADS 3.  Of note the  patient's prostate was measured at 182 g.  His PSA density is extremely favorable at around 0.05    Plan  Annual PSA

## 2022-10-17 NOTE — NURSING NOTE
Patient positioned in supine position, perineum area prepped with chlorhexidene Gluconate and patient draped per sterile technique. Per verbal order read back by Art Liu MD, Urojet 10mL 2% lidocaine jelly to be instilled into urethra.  Urojet- 10ml 2% Lidocaine jelly instilled into the urethra.    Urojet 2%  Lot#: [id number]  Expiration date: 5/2024  : [employer]  NDC: [address]-5    [place] Protocol    A. Pre-procedure verification complete Yes  1-relevant information / documentation available, reviewed and properly matched to the patient; 2-consent accurate and complete, 3-equipment and supplies available    B. Site marking complete N/A  Site marked if not in continuous attendance with patient    C. TIME OUT completed Yes  Time Out was conducted just prior to starting procedure to verify the eight required elements: 1-patient identity, 2-consent accurate and complete, 3-position, 4-correct side/site marked (if applicable), 5-procedure, 6-relevant images / results properly labeled and displayed (if applicable), 7-antibiotics / irrigation fluids (if applicable), 8-safety precautions.    After procedure perineum area rinsed. Discharge instructions reviewed with patient. Patient verbalized understanding of discharge instructions and discharged ambulatory.  Natalie Villavicencio LPN..................10/17/2022  8:43 AM

## 2022-10-17 NOTE — PROGRESS NOTES
1.  Has the patient had a previous reaction to IV contrast? no    2.  Does the patient have kidney disease? Yes- CKD3 - GFR 42    3.  Is the patient on dialysis? noo    If YES to any of these questions, exam will be reviewed with a Radiologist before administering contrast.    IV Contrast- Discharge Instructions After Your CT Scan      The IV contrast you received today will be filtered from your bloodstream by your kidneys during the next 24 hours and pass from the body in urine.  You will not be aware of this process and your urine will not change in color.  To help this process you should drink at least 4 additional glasses of water or juice today.  This reduces stress on your kidneys.    Most contrast reactions are immediate.  Should you develop symptoms of concern after discharge, contact the department at the number below.  After hours you should contact your personal physician.  If you develop breathing distress or wheezing, call 911.

## 2022-10-17 NOTE — PATIENT INSTRUCTIONS

## 2022-11-29 DIAGNOSIS — R20.9 DISTURBANCE OF SKIN SENSATION: ICD-10-CM

## 2022-11-30 RX ORDER — ACYCLOVIR 400 MG/1
TABLET ORAL
Qty: 90 TABLET | Refills: 3 | OUTPATIENT
Start: 2022-11-30

## 2022-11-30 NOTE — TELEPHONE ENCOUNTER
[facility] sent Rx request for the following:      ACYCLOVIR TABS 400MG      Last Prescription Date:   2/11/2022  Last Fill Qty/Refills:         90, R-3    Last Office Visit:              4/26/2022   Future Office visit:           none    Redundant refill request refused: Too soon:  Jay Borrego RN, BSN  ....................  11/30/2022   2:16 PM

## 2022-12-02 ENCOUNTER — TELEPHONE (OUTPATIENT)
Dept: FAMILY MEDICINE | Facility: OTHER | Age: 72
End: 2022-12-02

## 2022-12-02 NOTE — TELEPHONE ENCOUNTER
Reason for call: Medication or medication refill    Name of medication requested: acyclovir    Are you out of the medication? Yes    What pharmacy do you use? [facility]    Preferred method for responding to this message: Telephone Call    Phone number patient can be reached at: Home number on file [phone] (home)    If we cannot reach you directly, may we leave a detailed response at the number you provided? Yes

## 2022-12-02 NOTE — TELEPHONE ENCOUNTER
Called and spoke with patient. He does state he just received another bottle of 90 pills so has enough medication until February will request refill closer to when out of medications. No further concerns,    Corinne R Thayer, RN on 12/2/2022 at 12:07 PM

## 2023-01-24 ENCOUNTER — OFFICE VISIT (OUTPATIENT)
Dept: FAMILY MEDICINE | Facility: OTHER | Age: 73
End: 2023-01-24
Attending: FAMILY MEDICINE
Payer: COMMERCIAL

## 2023-01-24 VITALS
BODY MASS INDEX: 26.66 KG/M2 | OXYGEN SATURATION: 100 % | WEIGHT: 225.8 LBS | HEART RATE: 59 BPM | HEIGHT: 77 IN | DIASTOLIC BLOOD PRESSURE: 102 MMHG | RESPIRATION RATE: 18 BRPM | TEMPERATURE: 96.1 F | SYSTOLIC BLOOD PRESSURE: 178 MMHG

## 2023-01-24 DIAGNOSIS — G56.01 CARPAL TUNNEL SYNDROME OF RIGHT WRIST: Primary | ICD-10-CM

## 2023-01-24 DIAGNOSIS — G56.02 CARPAL TUNNEL SYNDROME OF LEFT WRIST: ICD-10-CM

## 2023-01-24 DIAGNOSIS — I10 ESSENTIAL HYPERTENSION: ICD-10-CM

## 2023-01-24 PROCEDURE — G0463 HOSPITAL OUTPT CLINIC VISIT: HCPCS

## 2023-01-24 PROCEDURE — 99214 OFFICE O/P EST MOD 30 MIN: CPT | Performed by: FAMILY MEDICINE

## 2023-01-24 RX ORDER — PREDNISONE 20 MG/1
40 TABLET ORAL DAILY
Qty: 10 TABLET | Refills: 0 | Status: SHIPPED | OUTPATIENT
Start: 2023-01-24 | End: 2023-01-24

## 2023-01-24 RX ORDER — PREDNISONE 20 MG/1
40 TABLET ORAL DAILY
Qty: 10 TABLET | Refills: 0 | Status: SHIPPED | OUTPATIENT
Start: 2023-01-24 | End: 2023-02-02

## 2023-01-24 ASSESSMENT — PAIN SCALES - GENERAL: PAINLEVEL: SEVERE PAIN (6)

## 2023-01-24 NOTE — PROGRESS NOTES
"Sports Medicine Office Note    HPI:  72-year-old male coming in for evaluation of bilateral wrist pain.  His pain has been present for several weeks.  No inciting event or trauma.  He is an avid weightlifter and does a lot of heavy lifting around his home.  With these activities he has had bouts of 1 week wrist pain that resolved spontaneously with rest.  These current symptoms have taken a different course.  He is currently endorsing 6/10 pain.  He feels the pain more on the volar aspect of the wrist.  He characterized the pain as sharp, aching, and burning.  He will occasionally get numbness and tingling that extend into the fingers, predominantly the fourth digit.  He also has pain that extends up the volar forearm and sometimes into the biceps.  He does note a little bit of swelling about the wrist.      EXAM:  BP (!) 178/102 (BP Location: Right arm, Patient Position: Sitting, Cuff Size: Adult Large)   Pulse 59   Temp (!) 96.1  F (35.6  C) (Tympanic)   Resp 18   Ht 1.943 m (6' 4.5\")   Wt 102.4 kg (225 lb 12.8 oz)   SpO2 100%   BMI 27.13 kg/m    MUSCULOSKELETAL EXAM:  RIGHT WRIST  Inspection:  -No gross deformity  -No bruising or swelling  -Scars:  None    Tenderness to palpation of the:  -Medial epicondyle:  Negative  -Lateral epicondyle:  Negative  -Radial head:  Negative  -Radial shaft:  Negative  -Ulnar shaft:  Negative  -Forearm flexors and extensors:  Negative  -Ulnar styloid:  Negative  -Distal radius:  Negative  -Farnaz's tubercle:  Negative  -Scapholunate ligament:  Negative  -Scaphoid in anatomical snuffbox:  Negative  -1st CMC joint:  Negative  -1st MCP joint:  Negative  -Metacarpals:  Negative    Strength:  - strength:  5/5  -Wrist extension:  5/5    Sensation:  -Intact to light touch in the radial, median, and ulnar nerve distributions    Motor:  -Intact AIN, PIN, and IO    Special Tests:  -Phalen test: Positive  -Tinel test: Positive  -Durkan test: Positive    Other:  -No signs of " cyanosis. Normal skin temperature of the upper extremity.  -Elbow:  No gross deformity. Full range of motion.    MUSCULOSKELETAL EXAM:  LEFT WRIST  Inspection:  -No gross deformity  -No bruising or swelling  -Scars:  None    Tenderness to palpation of the:  -Medial epicondyle:  Negative  -Lateral epicondyle:  Negative  -Radial head:  Negative  -Radial shaft:  Negative  -Ulnar shaft:  Negative  -Forearm flexors and extensors:  Negative  -Ulnar styloid:  Negative  -Distal radius:  Negative  -Farnaz's tubercle:  Negative  -Scapholunate ligament:  Negative  -Scaphoid in anatomical snuffbox:  Negative  -1st CMC joint:  Negative  -1st MCP joint:  Negative  -Metacarpals:  Negative    Strength:  - strength:  5/5  -Wrist extension:  5/5    Sensation:  -Intact to light touch in the radial, median, and ulnar nerve distributions    Motor:  -Intact AIN, PIN, and IO    Special Tests:  -Phalen test: Positive  -Tinel test:  Negative  -Durkan test:  Negative    Other:  -No signs of cyanosis. Normal skin temperature of the upper extremity.  -Elbow:  No gross deformity. Full range of motion.      IMAGING:  None      ASSESSMENT/PLAN:  Diagnoses and all orders for this visit:  Carpal tunnel syndrome of right wrist  -     Wrist/Arm Supplies Order Wrist Brace; Right; non-thumb spica  -     predniSONE (DELTASONE) 20 MG tablet; Take 2 tablets (40 mg) by mouth daily  Carpal tunnel syndrome of left wrist  -     predniSONE (DELTASONE) 20 MG tablet; Take 2 tablets (40 mg) by mouth daily  Essential hypertension    72-year-old male with bilateral carpal tunnel syndrome, right worse than left.  We discussed treatment options for this condition to include rest, activity modification, bracing, exercises, anti-inflammatories, hand therapy, injections, and surgical referral.  No indications for imaging or additional testing at this time.  - Handout given with home exercises for carpal tunnel syndrome  - Prednisone 40 mg daily x5 days  - Patient  fit for a wrist brace on the right side as this is the more symptomatic wrist  - Follow-up in 4 weeks as needed for persistent symptoms, sooner if worsening  - Consider CSI in the future if needed    Blood pressure:  Elevated blood pressure in the office today with multiple checks.  Patient is taking his medication.  May need to consider increasing dosages.  - Take blood pressures at home  - Follow-up with PCP at earliest convenience      Rick Amaro MD  1/24/2023  8:10 AM    Total time spent with this patient was 32 minutes which included chart review, visualization and independent interpretation of images, time spent with the patient, and documentation.    Procedure time:  0 minute(s)

## 2023-01-24 NOTE — NURSING NOTE
"Chief Complaint   Patient presents with     Pain     Bilateral wrist pain that radiates to hands and up forearms into biceps      Patient is here for bilateral wrist pain that radiates to hands and up forearms and into biceps. Pain 6/10. No known injury. Patient denies past surgery or injections.     Initial BP (!) 180/102 (BP Location: Right arm, Patient Position: Sitting, Cuff Size: Adult Large)   Pulse 59   Temp (!) 96.1  F (35.6  C) (Tympanic)   Resp 18   Ht 1.943 m (6' 4.5\")   Wt 102.4 kg (225 lb 12.8 oz)   SpO2 100%   BMI 27.13 kg/m   Estimated body mass index is 27.13 kg/m  as calculated from the following:    Height as of this encounter: 1.943 m (6' 4.5\").    Weight as of this encounter: 102.4 kg (225 lb 12.8 oz).       Medication Reconciliation: Complete    Siobhan Iglesias LPN .......  1/24/2023  8:06 AM   "

## 2023-01-26 ENCOUNTER — OFFICE VISIT (OUTPATIENT)
Dept: FAMILY MEDICINE | Facility: OTHER | Age: 73
End: 2023-01-26
Attending: NURSE PRACTITIONER
Payer: COMMERCIAL

## 2023-01-26 ENCOUNTER — NURSE TRIAGE (OUTPATIENT)
Dept: FAMILY MEDICINE | Facility: OTHER | Age: 73
End: 2023-01-26
Payer: COMMERCIAL

## 2023-01-26 VITALS
OXYGEN SATURATION: 99 % | DIASTOLIC BLOOD PRESSURE: 110 MMHG | WEIGHT: 222.5 LBS | SYSTOLIC BLOOD PRESSURE: 206 MMHG | TEMPERATURE: 96.7 F | BODY MASS INDEX: 26.73 KG/M2 | HEART RATE: 68 BPM

## 2023-01-26 DIAGNOSIS — N18.30 STAGE 3 CHRONIC KIDNEY DISEASE, UNSPECIFIED WHETHER STAGE 3A OR 3B CKD (H): ICD-10-CM

## 2023-01-26 DIAGNOSIS — I10 ESSENTIAL HYPERTENSION: Primary | ICD-10-CM

## 2023-01-26 PROCEDURE — 84244 ASSAY OF RENIN: CPT | Mod: ZL

## 2023-01-26 PROCEDURE — 99214 OFFICE O/P EST MOD 30 MIN: CPT

## 2023-01-26 PROCEDURE — 36415 COLL VENOUS BLD VENIPUNCTURE: CPT | Mod: ZL

## 2023-01-26 PROCEDURE — G0463 HOSPITAL OUTPT CLINIC VISIT: HCPCS

## 2023-01-26 PROCEDURE — 82088 ASSAY OF ALDOSTERONE: CPT | Mod: ZL

## 2023-01-26 RX ORDER — CHLORTHALIDONE 25 MG/1
25 TABLET ORAL DAILY
Qty: 30 TABLET | Refills: 0 | Status: SHIPPED | OUTPATIENT
Start: 2023-01-26 | End: 2023-02-02

## 2023-01-26 RX ORDER — LOSARTAN POTASSIUM 50 MG/1
100 TABLET ORAL DAILY
Qty: 30 TABLET | Refills: 0 | Status: SHIPPED | OUTPATIENT
Start: 2023-01-26 | End: 2023-02-02

## 2023-01-26 ASSESSMENT — PAIN SCALES - GENERAL: PAINLEVEL: NO PAIN (1)

## 2023-01-26 NOTE — PROGRESS NOTES
ASSESSMENT/PLAN:    I have reviewed the nursing notes.  I have reviewed the findings, diagnosis, plan and need for follow up with the patient.    1. Essential hypertension  2. Stage 3 chronic kidney disease, unspecified whether stage 3a or 3b CKD (H)  - losartan (COZAAR) 50 MG tablet; Take 2 tablets (100 mg) by mouth daily  Dispense: 30 tablet; Refill: 0  - chlorthalidone (HYGROTON) 25 MG tablet; Take 1 tablet (25 mg) by mouth daily  Dispense: 30 tablet; Refill: 0  - Aldosterone Renin Ratio    Patient presents to clinic with concerns of elevated blood pressure.  He is currently on hydrochlorothiazide 25mg and losartan 50mg daily and is compliant with those medications.  He does not normally check his blood pressure at home but started doing so earlier this week due to having a high blood pressure reading at his recent orthopedics visit.  He has been having elevated blood pressure readings at home and became concerned today when his systolic blood pressure reading was over 200.  He then went to [facility] to check his blood pressure to make sure that his home monitor was working properly and his reading was 202/113.  He is currently asymptomatic except for occasional dizziness.  All other vitals are stable and patient appears nontoxic. He has no other current cardiac symptoms.  Physical exam showed no abnormalities.  Discussed patient situation with Dr. Ferguson who recommended discontinuing patient's hydrochlorothiazide.  He also recommended starting patient on chlorthalidone 25 mg and increasing patient's losartan to 100 mg daily.  No need for emergency interventions at this time due to patient being asymptomatic and stable. Aldosterone renin ratio labs are currently pending and patient will be notified of results.  It was also recommended that patient get a renal ultrasound to rule out renal stenosis.  He will discuss this with Dr. Ferguson during his follow-up appointment next week so that this can be ordered.  Advised  patient to continue monitoring his blood pressure at home while minimizing salt, alcohol, and caffeine intake.  Discussed conservative ways to help lower blood pressure at home.    Discussed warning signs/symptoms indicative of need to f/u    Follow up if symptoms persist or worsen or concerns    I explained my diagnostic considerations and recommendations to the patient, who voiced understanding and agreement with the treatment plan. All questions were answered. We discussed potential side effects of any prescribed or recommended therapies, as well as expectations for response to treatments.    CODI Jovel CNP  1/26/2023  11:05 AM    HPI:    David Samaniego is a 72 year old male accompanied by his wife who [facility] for concerns of hypertension    Patient has a hx of hypertension. He is currently on hydrochlorothiazide 25mg daily and losartan 50mg daily and he states that he did take those this morning. He does have an at home BP machine and has been checking his BP for the last few days due to having an elevated BP at his appointment with orthopedics earlier this week. He did check his BP at home this morning after he took his meds and his systolic reading was over 200. He stopped at [facility] this morning to check his BP to make sure his home monitor was working correctly and the reading there was 202/113. He has had some slight occasional dizziness. He denies headaches, blurred vision, shortness of breath, heart palpitations, or chest pain. He also has a hx of chronic kidney disease which is stable based on past lab results. Creatinine and GFR was last tested in September.  He is still able to urinate and denies any new urinary issues.    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     No Comments Provided     Enlarged prostate without lower urinary  tract symptoms (luts)     No Comments Provided     Essential (primary) hypertension     No Comments Provided     Gastro-esophageal reflux disease without esophagitis     No Comments Provided     Hypothyroidism     No Comments Provided     Other specified congenital malformations of intestine     No Comments Provided     PONV (postoperative nausea and vomiting)     with surgeries in the 80's     Past Surgical History:   Procedure Laterality Date     BACK SURGERY      1986 and 1987     COLONOSCOPY      7/29/05,next due 2015     COLONOSCOPY  10/26/2015    Tubular adenoma,F/U 2020     COLONOSCOPY N/A 11/16/2020    F/U 2025 tubular adenomas     CYSTOSCOPY, TRANSURETHRAL RESECTION (TUR) PROSTATE, COMBINED N/A 07/30/2019    Procedure: CYSTOSCOPY, WITH TRANSURETHRAL RESECTION (TUR) PROSTATE;  Surgeon: Art Liu MD;  Location: [facility] OR     OTHER SURGICAL HISTORY      [id number],PARTIAL THYROIDECTOMY     OTHER SURGICAL HISTORY      208489,ANESTHESIA ALERT,Notes no prior complications from anesthesia.:     [facility] TUR PROSTATE BIOPSIES       Social History     Tobacco Use     Smoking status: Never     Smokeless tobacco: Never   Substance Use Topics     Alcohol use: Yes     Alcohol/week: 0.0 standard drinks     Comment: Alcoholic Drinks/day: occasionally     Current Outpatient Medications   Medication Sig Dispense Refill     acyclovir (ZOVIRAX) 400 MG tablet Take 1 tablet (400 mg) by mouth daily 90 tablet 3     cholecalciferol (VITAMIN D3) 5000 units TABS tablet Take by mouth daily       hydrochlorothiazide (HYDRODIURIL) 25 MG tablet TAKE 1 TABLET DAILY 90 tablet 3     levothyroxine (SYNTHROID/LEVOTHROID) 150 MCG tablet TAKE 1 TABLET DAILY WITH BREAKFAST 90 tablet 3     losartan (COZAAR) 50 MG tablet Take 1 tablet (50 mg) by mouth daily 90 tablet 3     omeprazole (PRILOSEC) 20 MG DR capsule Take 1 capsule (20 mg) by mouth daily 90 capsule 3     predniSONE (DELTASONE) 20 MG tablet Take 2 tablets (40 mg) by mouth daily 10 tablet 0      No Known Allergies  Past medical history, past surgical history, current medications and allergies reviewed and accurate to the best of my knowledge.      ROS:  Refer to HPI    BP (!) 206/110   Pulse 68   Temp (!) 96.7  F (35.9  C) (Tympanic)   Wt 100.9 kg (222 lb 8 oz)   SpO2 99%   BMI 26.73 kg/m      EXAM:  General Appearance: Well appearing 72 year old male, appropriate appearance for age. No acute distress   Respiratory: normal chest wall and respirations.  Normal effort.  Clear to auscultation bilaterally, no wheezing, crackles or rhonchi.  No increased work of breathing.  No cough appreciated.  Cardiac: RRR with no murmurs  Musculoskeletal:  Equal movement of bilateral upper extremities.  Equal movement of bilateral lower extremities.  Normal gait.    Dermatological: no rashes noted of exposed skin  Neuro: Alert and oriented to person, place, and time.  Cranial nerves II-XII grossly intact with no focal or lateralizing deficits.  Muscle tone normal.  Gait normal. No tremor.   Psychological: normal affect, alert, oriented, and pleasant.     Labs:  pending

## 2023-01-26 NOTE — TELEPHONE ENCOUNTER
"S-(situation): elevated blood pressure    B-(background): Patient states he has had elevated blood pressure the past few weeks. History of hypertension- is on losartan and hydrochlorothiazide    A-(assessment): Blood pressures throughout the day has been 190-200/. Denies any cardiac symptoms.    R-(recommendations): Per protocol, see in office today. No clinic appointments available so will present to [facility] immediately following phone call    Corinne R Thayer, RN on 1/26/2023 at 10:44 AM    Reason for Disposition    Systolic BP >= 180 OR Diastolic >= 110    Additional Information    Negative: Sounds like a life-threatening emergency to the triager    Negative: Pregnant > 20 weeks or postpartum (< 6 weeks after delivery) and new hand or face swelling    Negative: Pregnant > 20 weeks and BP > 140/90    Negative: Systolic BP >= 160 OR Diastolic >= 100, and any cardiac or neurologic symptoms (e.g., chest pain, difficulty breathing, unsteady gait, blurred vision)    Negative: Patient sounds very sick or weak to the triager    Negative: BP Systolic BP >= 140 OR Diastolic >= 90 and postpartum (from 0 to 6 weeks after delivery)    Negative: Systolic BP >= 180 OR Diastolic >= 110, and missed most recent dose of blood pressure medication    Answer Assessment - Initial Assessment Questions  1. BLOOD PRESSURE: \"What is the blood pressure?\" \"Did you take at least two measurements 5 minutes apart?\"      190-200/  2. ONSET: \"When did you take your blood pressure?\"      Several times throughout the day  3. HOW: \"How did you obtain the blood pressure?\" (e.g., visiting nurse, automatic home BP monitor)      Automated cuff at home, cuff at pharmacy, pharmacist took blood pressure  4. HISTORY: \"Do you have a history of high blood pressure?\"      yes  5. MEDICATIONS: \"Are you taking any medications for blood pressure?\" \"Have you missed any doses recently?\"      yes  6. OTHER SYMPTOMS: \"Do you have any symptoms?\" " "(e.g., headache, chest pain, blurred vision, difficulty breathing, weakness)      No syptoms  7. PREGNANCY: \"Is there any chance you are pregnant?\" \"When was your last menstrual period?\"      NA    Protocols used: HIGH BLOOD PRESSURE-A-OH    "

## 2023-01-26 NOTE — TELEPHONE ENCOUNTER
Patient states that he was in to see Dr. Amaro at that time of appointment his blood pressure was reading high. He was told to check in with [facility] and since that appointment he has had high BP readings. This morning BP read close to 200. He is wondering what [facility] would advise at this time.     Juliette Solorio on 1/26/2023 at 8:30 AM

## 2023-01-26 NOTE — PATIENT INSTRUCTIONS
Follow up in 1-2 weeks with available provider in clinic  Recommend renal ultrasound to rule out renal stenosis  Stop taking your hydrochlorothiazide  Start chlorthalidone 25mg daily  Increase losartan to 100mg daily

## 2023-01-27 LAB — ALDOST SERPL-MCNC: 10.3 NG/DL (ref 0–31)

## 2023-01-30 LAB
ALDOST/RENIN PLAS-RTO: 14.7 {RATIO} (ref 0–25)
RENIN PLAS-CCNC: 0.7 NG/ML/HR

## 2023-02-02 ENCOUNTER — OFFICE VISIT (OUTPATIENT)
Dept: FAMILY MEDICINE | Facility: OTHER | Age: 73
End: 2023-02-02
Attending: FAMILY MEDICINE
Payer: COMMERCIAL

## 2023-02-02 VITALS
WEIGHT: 218.4 LBS | SYSTOLIC BLOOD PRESSURE: 150 MMHG | BODY MASS INDEX: 26.24 KG/M2 | HEART RATE: 68 BPM | DIASTOLIC BLOOD PRESSURE: 88 MMHG | OXYGEN SATURATION: 100 % | RESPIRATION RATE: 20 BRPM | TEMPERATURE: 96.8 F

## 2023-02-02 DIAGNOSIS — I10 ESSENTIAL HYPERTENSION: Primary | ICD-10-CM

## 2023-02-02 PROCEDURE — 99213 OFFICE O/P EST LOW 20 MIN: CPT | Performed by: FAMILY MEDICINE

## 2023-02-02 PROCEDURE — G0463 HOSPITAL OUTPT CLINIC VISIT: HCPCS

## 2023-02-02 RX ORDER — LOSARTAN POTASSIUM 100 MG/1
100 TABLET ORAL DAILY
COMMUNITY
Start: 2023-02-02 | End: 2023-02-28

## 2023-02-02 RX ORDER — AMLODIPINE BESYLATE 5 MG/1
5 TABLET ORAL DAILY
Qty: 90 TABLET | Refills: 3 | Status: SHIPPED | OUTPATIENT
Start: 2023-02-02 | End: 2023-03-01

## 2023-02-02 RX ORDER — CHLORTHALIDONE 25 MG/1
25 TABLET ORAL DAILY
Qty: 90 TABLET | Refills: 3 | Status: SHIPPED | OUTPATIENT
Start: 2023-02-02 | End: 2023-03-01

## 2023-02-02 ASSESSMENT — PAIN SCALES - GENERAL: PAINLEVEL: NO PAIN (0)

## 2023-02-02 NOTE — PROGRESS NOTES
"  Assessment & Plan     Essential hypertension  Currently not under good control add Norvasc  - chlorthalidone (HYGROTON) 25 MG tablet; Take 1 tablet (25 mg) by mouth daily  - amLODIPine (NORVASC) 5 MG tablet; Take 1 tablet (5 mg) by mouth daily             BMI:   Estimated body mass index is 26.24 kg/m  as calculated from the following:    Height as of 1/24/23: 1.943 m (6' 4.5\").    Weight as of this encounter: 99.1 kg (218 lb 6.4 oz).       Follow-up with PCP March    No follow-ups on file.    Mark Ferguson MD  [facility]    Subjective   Anuel is a 72 year old, presenting for the following health issues:  Hypertension      Patient arrives here for [facility] follow-up.  Patient states he was seen in the [facility] clinic about 2 weeks ago.  His Cozaar was increased 200 mg from 50.  He is tolerating the.  He is continue to do blood pressures at home.  They continue to be elevated.  He does have a annual physical with his PCP in March.  Wishes to have his blood work done then.  His angiotensin renin levels were normal.  Is creatinine and GFR has remained stable.  Patient does have chronic kidney disease stage III    History of Present Illness       Reason for visit:  High blood pressure  Symptom onset:  1-3 days ago  Symptoms include:  Slight headache  Symptom intensity:  Mild  Symptom progression:  Staying the same  Had these symptoms before:  No  What makes it worse:  No  What makes it better:  Rod consumes 1 sweetened beverage(s) daily.He exercises with enough effort to increase his heart rate 30 to 60 minutes per day.  He exercises with enough effort to increase his heart rate 3 or less days per week.              Review of Systems         Objective    BP (!) 150/88 (BP Location: Other (Comment))   Pulse 68   Temp 96.8  F (36  C)   Resp 20   Wt 99.1 kg (218 lb 6.4 oz)   SpO2 100%   BMI 26.24 kg/m    Body mass index is 26.24 kg/m .  Physical Exam  Constitutional:       Appearance: " Normal appearance.   HENT:      Head: Normocephalic and atraumatic.   Neurological:      Mental Status: He is alert.   Psychiatric:         Mood and Affect: Mood normal.         Thought Content: Thought content normal.

## 2023-02-02 NOTE — NURSING NOTE
Patient here for follow up for hypertension after being seen in the [facility] on 01/26/2023. Medication Reconciliation: complete.    Julia Wright, ALEJANDRO  2/2/2023 1:00 PM

## 2023-02-14 ENCOUNTER — MYC MEDICAL ADVICE (OUTPATIENT)
Dept: FAMILY MEDICINE | Facility: OTHER | Age: 73
End: 2023-02-14
Payer: COMMERCIAL

## 2023-02-16 ENCOUNTER — TELEPHONE (OUTPATIENT)
Dept: FAMILY MEDICINE | Facility: OTHER | Age: 73
End: 2023-02-16
Payer: COMMERCIAL

## 2023-02-16 DIAGNOSIS — G56.02 CARPAL TUNNEL SYNDROME OF LEFT WRIST: Primary | ICD-10-CM

## 2023-02-16 NOTE — TELEPHONE ENCOUNTER
Patient is requesting a brace for his left wrist.  He was seen by Dr. Amaro on 1.24.2023 and received one for his right.  At that time he didn't think he needed one for his left but has now changed his mind.  He would like to pick it up if we have one available for him.  Denisha Guadarrama on 2/16/2023 at 9:15 AM

## 2023-02-16 NOTE — TELEPHONE ENCOUNTER
Per Dr. Shola cerda to give brace. I called patient and he will come in today and get that.   Kita Murillo LPN .....................2/16/2023 12:40 PM

## 2023-02-17 DIAGNOSIS — I10 ESSENTIAL HYPERTENSION: ICD-10-CM

## 2023-02-23 ENCOUNTER — OFFICE VISIT (OUTPATIENT)
Dept: FAMILY MEDICINE | Facility: OTHER | Age: 73
End: 2023-02-23
Attending: FAMILY MEDICINE
Payer: COMMERCIAL

## 2023-02-23 VITALS
TEMPERATURE: 95.6 F | DIASTOLIC BLOOD PRESSURE: 74 MMHG | RESPIRATION RATE: 16 BRPM | SYSTOLIC BLOOD PRESSURE: 136 MMHG | WEIGHT: 221.8 LBS | BODY MASS INDEX: 26.65 KG/M2 | OXYGEN SATURATION: 99 % | HEART RATE: 78 BPM

## 2023-02-23 DIAGNOSIS — G56.01 CARPAL TUNNEL SYNDROME OF RIGHT WRIST: Primary | ICD-10-CM

## 2023-02-23 DIAGNOSIS — G56.02 CARPAL TUNNEL SYNDROME OF LEFT WRIST: ICD-10-CM

## 2023-02-23 PROCEDURE — G0463 HOSPITAL OUTPT CLINIC VISIT: HCPCS

## 2023-02-23 PROCEDURE — 99214 OFFICE O/P EST MOD 30 MIN: CPT | Performed by: FAMILY MEDICINE

## 2023-02-23 ASSESSMENT — PAIN SCALES - GENERAL: PAINLEVEL: MILD PAIN (2)

## 2023-02-23 NOTE — NURSING NOTE
"Chief Complaint   Patient presents with     Follow Up     Carpal tunnel syndrome bilateral wrists      Patient presents for carpal tunnel syndrome bilateral wrists, possible ultrasound guided bilateral wrist injections. Pain 2/10.     Initial /74 (BP Location: Right arm, Patient Position: Sitting, Cuff Size: Adult Large)   Pulse 78   Temp (!) 95.6  F (35.3  C) (Tympanic)   Resp 16   Wt 100.6 kg (221 lb 12.8 oz)   SpO2 99%   BMI 26.65 kg/m   Estimated body mass index is 26.65 kg/m  as calculated from the following:    Height as of 1/24/23: 1.943 m (6' 4.5\").    Weight as of this encounter: 100.6 kg (221 lb 12.8 oz).       Medication Reconciliation: Complete    Siobhan Iglesias LPN .......  2/23/2023  10:39 AM   "

## 2023-02-23 NOTE — PROGRESS NOTES
Sports Medicine Office Note    HPI:  72-year-old male coming in for follow-up evaluation of bilateral wrist pain.  His pain has been present since around the first of the year.  No inciting event or trauma.  His symptoms have been particularly bothersome within the last month.  He was last seen in this office on 1/24.  At that time he was given wrist braces for carpal tunnel syndrome.  His symptoms have not been improving with this intervention.  He comes in today to discuss alternative treatment options.  He is still noticing pain in the wrist with numbness/tingling that particularly involves the first and fourth digits of the bilateral hands.  Sometimes his pain is quite bothersome, other times he has very minimal symptoms.      EXAM:  /74 (BP Location: Right arm, Patient Position: Sitting, Cuff Size: Adult Large)   Pulse 78   Temp (!) 95.6  F (35.3  C) (Tympanic)   Resp 16   Wt 100.6 kg (221 lb 12.8 oz)   SpO2 99%   BMI 26.65 kg/m    Musculoskeletal exam: Bilateral wrist  - No gross deformity  - No bruising or swelling  - Normal skin temperature without cyanosis  - Negative Phalen test bilaterally  - Positive Tinel test bilaterally  - Negative Durkan test bilaterally      IMAGING:  None      ASSESSMENT/PLAN:  Diagnoses and all orders for this visit:  Carpal tunnel syndrome of right wrist  -     Adult Neurology  Referral; Future  Carpal tunnel syndrome of left wrist  -     Adult Neurology  Referral; Future    72-year-old male with bilateral carpal tunnel syndrome, right worse than left.  He has been bracing and doing home exercises with minimal improvement in his symptoms.  We discussed that injections or carpal tunnel surgery may be indicated if noninvasive interventions fail to provide desired relief.  After a lengthy discussion discussing alternative treatment options, the patient still seems hesitant about moving forward with injections.  We discussed that an alternative approach  may be to better quantify the extent of the disease with nerve testing.  He is amenable to this approach.  - Referral placed to neurology for EMG/NCS of bilateral median nerves at the carpal tunnel  - Follow-up in the office following neurology consultation to discuss alternative treatment options  - If it looks like the patient would benefit most from a surgery, we will arrange for him to have consultation with orthopedic hand surgery      Rick Amaro MD  2/23/2023  9:50 AM    Total time spent with this patient was 32 minutes which included chart review, visualization and independent interpretation of images, time spent with the patient, and documentation.    Procedure time:  0 minute(s)

## 2023-02-28 ASSESSMENT — ENCOUNTER SYMPTOMS
COUGH: 0
ARTHRALGIAS: 1
JOINT SWELLING: 0
NAUSEA: 0
FREQUENCY: 0
SHORTNESS OF BREATH: 0
HEADACHES: 0
ABDOMINAL PAIN: 0
EYE PAIN: 0
PALPITATIONS: 0
HEMATURIA: 0
HEMATOCHEZIA: 0
DYSURIA: 0
DIZZINESS: 0
DIARRHEA: 0
NERVOUS/ANXIOUS: 0
CONSTIPATION: 0
FEVER: 0
MYALGIAS: 0
PARESTHESIAS: 0
WEAKNESS: 0
CHILLS: 0
SORE THROAT: 0
HEARTBURN: 0

## 2023-02-28 ASSESSMENT — ACTIVITIES OF DAILY LIVING (ADL): CURRENT_FUNCTION: NO ASSISTANCE NEEDED

## 2023-02-28 NOTE — PROGRESS NOTES
"Pre-Visit Planning   Next 5 appointments (look out 90 days)    Mar 01, 2023  8:40 AM  PHYSICAL with Fady Penaloza MD  [facility] ([facility] ) [address]  [place] [id number] [phone]        Appointment Notes for this encounter:   Annual medicare wellness    Questionnaires Reviewed/Assigned  No additional questionnaires are needed    Patient preferred phone number: [phone]    Unable to reach. Left voicemail. Advised patient to call clinic back at [phone].    Corinne R Thayer, RN on 2/28/2023 at 12:44 PM      Answers for HPI/ROS submitted by the patient on 2/28/2023  In general, how would you rate your overall physical health?: good  Frequency of exercise:: 2-3 days/week  Do you usually eat at least 4 servings of fruit and vegetables a day, include whole grains & fiber, and avoid regularly eating high fat or \"junk\" foods? : No  Taking medications regularly:: Yes  Medication side effects:: Not applicable  Activities of Daily Living: no assistance needed  Home safety: no safety concerns identified  Hearing Impairment:: no hearing concerns  In the past 6 months, have you been bothered by leaking of urine?: Yes  abdominal pain: No  Blood in stool: No  Blood in urine: No  chest pain: No  chills: No  congestion: No  constipation: No  cough: No  diarrhea: No  dizziness: No  ear pain: No  eye pain: No  nervous/anxious: No  fever: No  frequency: No  genital sores: No  headaches: No  hearing loss: No  heartburn: No  arthralgias: Yes  joint swelling: No  peripheral edema: No  mood changes: No  myalgias: No  nausea: No  dysuria: No  palpitations: No  Skin sensation changes: No  sore throat: No  urgency: No  rash: No  shortness of breath: No  visual disturbance: No  weakness: No  impotence: Yes  penile discharge: No  In general, how would you rate your overall mental or emotional health?: excellent  Additional concerns today:: " No  Duration of exercise:: Other

## 2023-03-01 ENCOUNTER — OFFICE VISIT (OUTPATIENT)
Dept: FAMILY MEDICINE | Facility: OTHER | Age: 73
End: 2023-03-01
Attending: FAMILY MEDICINE
Payer: COMMERCIAL

## 2023-03-01 VITALS
HEART RATE: 50 BPM | BODY MASS INDEX: 26.59 KG/M2 | DIASTOLIC BLOOD PRESSURE: 80 MMHG | HEIGHT: 76 IN | RESPIRATION RATE: 16 BRPM | WEIGHT: 218.4 LBS | OXYGEN SATURATION: 98 % | SYSTOLIC BLOOD PRESSURE: 138 MMHG | TEMPERATURE: 97.4 F

## 2023-03-01 DIAGNOSIS — I10 ESSENTIAL HYPERTENSION: ICD-10-CM

## 2023-03-01 DIAGNOSIS — E03.9 HYPOTHYROIDISM, UNSPECIFIED TYPE: ICD-10-CM

## 2023-03-01 DIAGNOSIS — Z11.59 NEED FOR HEPATITIS C SCREENING TEST: ICD-10-CM

## 2023-03-01 DIAGNOSIS — N18.31 STAGE 3A CHRONIC KIDNEY DISEASE (H): ICD-10-CM

## 2023-03-01 DIAGNOSIS — Z13.220 SCREENING FOR HYPERLIPIDEMIA: ICD-10-CM

## 2023-03-01 DIAGNOSIS — R20.9 DISTURBANCE OF SKIN SENSATION: ICD-10-CM

## 2023-03-01 DIAGNOSIS — K21.9 GASTROESOPHAGEAL REFLUX DISEASE WITHOUT ESOPHAGITIS: ICD-10-CM

## 2023-03-01 DIAGNOSIS — Z00.00 ENCOUNTER FOR MEDICARE ANNUAL WELLNESS EXAM: Primary | ICD-10-CM

## 2023-03-01 LAB
ANION GAP SERPL CALCULATED.3IONS-SCNC: 9 MMOL/L (ref 7–15)
BUN SERPL-MCNC: 30.4 MG/DL (ref 8–23)
CALCIUM SERPL-MCNC: 11.2 MG/DL (ref 8.8–10.2)
CHLORIDE SERPL-SCNC: 105 MMOL/L (ref 98–107)
CHOLEST SERPL-MCNC: 175 MG/DL
CREAT SERPL-MCNC: 1.81 MG/DL (ref 0.67–1.17)
CREAT UR-MCNC: 134.7 MG/DL
DEPRECATED HCO3 PLAS-SCNC: 25 MMOL/L (ref 22–29)
GFR SERPL CREATININE-BSD FRML MDRD: 39 ML/MIN/1.73M2
GLUCOSE SERPL-MCNC: 117 MG/DL (ref 70–99)
HDLC SERPL-MCNC: 62 MG/DL
HGB BLD-MCNC: 14.5 G/DL (ref 13.3–17.7)
LDLC SERPL CALC-MCNC: 101 MG/DL
MICROALBUMIN UR-MCNC: 56.7 MG/L
MICROALBUMIN/CREAT UR: 42.09 MG/G CR (ref 0–17)
NONHDLC SERPL-MCNC: 113 MG/DL
POTASSIUM SERPL-SCNC: 4.1 MMOL/L (ref 3.4–5.3)
SODIUM SERPL-SCNC: 139 MMOL/L (ref 136–145)
TRIGL SERPL-MCNC: 61 MG/DL
TSH SERPL DL<=0.005 MIU/L-ACNC: 1.5 UIU/ML (ref 0.3–4.2)

## 2023-03-01 PROCEDURE — 99213 OFFICE O/P EST LOW 20 MIN: CPT | Mod: 25 | Performed by: FAMILY MEDICINE

## 2023-03-01 PROCEDURE — 80061 LIPID PANEL: CPT | Mod: ZL | Performed by: FAMILY MEDICINE

## 2023-03-01 PROCEDURE — G0463 HOSPITAL OUTPT CLINIC VISIT: HCPCS

## 2023-03-01 PROCEDURE — 82570 ASSAY OF URINE CREATININE: CPT | Mod: ZL | Performed by: FAMILY MEDICINE

## 2023-03-01 PROCEDURE — 80048 BASIC METABOLIC PNL TOTAL CA: CPT | Mod: ZL | Performed by: FAMILY MEDICINE

## 2023-03-01 PROCEDURE — 84443 ASSAY THYROID STIM HORMONE: CPT | Mod: ZL | Performed by: FAMILY MEDICINE

## 2023-03-01 PROCEDURE — 36415 COLL VENOUS BLD VENIPUNCTURE: CPT | Mod: ZL | Performed by: FAMILY MEDICINE

## 2023-03-01 PROCEDURE — 86803 HEPATITIS C AB TEST: CPT | Mod: ZL | Performed by: FAMILY MEDICINE

## 2023-03-01 PROCEDURE — G0439 PPPS, SUBSEQ VISIT: HCPCS | Performed by: FAMILY MEDICINE

## 2023-03-01 PROCEDURE — 85018 HEMOGLOBIN: CPT | Mod: ZL | Performed by: FAMILY MEDICINE

## 2023-03-01 RX ORDER — CHLORTHALIDONE 25 MG/1
25 TABLET ORAL DAILY
Qty: 90 TABLET | Refills: 3 | Status: SHIPPED | OUTPATIENT
Start: 2023-03-01 | End: 2024-04-16

## 2023-03-01 RX ORDER — LEVOTHYROXINE SODIUM 150 UG/1
150 TABLET ORAL
Qty: 90 TABLET | Refills: 4 | Status: SHIPPED | OUTPATIENT
Start: 2023-03-01 | End: 2024-04-16

## 2023-03-01 RX ORDER — LOSARTAN POTASSIUM 50 MG/1
TABLET ORAL
COMMUNITY
Start: 2023-02-18 | End: 2023-03-01

## 2023-03-01 RX ORDER — AMLODIPINE BESYLATE 10 MG/1
10 TABLET ORAL DAILY
Qty: 90 TABLET | Refills: 4 | Status: SHIPPED | OUTPATIENT
Start: 2023-03-01 | End: 2024-04-16

## 2023-03-01 RX ORDER — LOSARTAN POTASSIUM 100 MG/1
100 TABLET ORAL DAILY
Qty: 90 TABLET | Refills: 4 | Status: SHIPPED | OUTPATIENT
Start: 2023-03-01 | End: 2023-04-13

## 2023-03-01 RX ORDER — ACYCLOVIR 400 MG/1
400 TABLET ORAL DAILY
Qty: 90 TABLET | Refills: 4 | Status: SHIPPED | OUTPATIENT
Start: 2023-03-01 | End: 2024-04-16

## 2023-03-01 ASSESSMENT — ENCOUNTER SYMPTOMS
EYE PAIN: 0
DIARRHEA: 0
JOINT SWELLING: 0
WEAKNESS: 0
FEVER: 0
ABDOMINAL PAIN: 0
SHORTNESS OF BREATH: 0
HEADACHES: 0
FREQUENCY: 0
CHILLS: 0
DIZZINESS: 0
CONSTIPATION: 0
HEARTBURN: 0
DYSURIA: 0
PARESTHESIAS: 0
HEMATOCHEZIA: 0
SORE THROAT: 0
COUGH: 0
ARTHRALGIAS: 1
NERVOUS/ANXIOUS: 0
HEMATURIA: 0
NAUSEA: 0
PALPITATIONS: 0
MYALGIAS: 0

## 2023-03-01 ASSESSMENT — ACTIVITIES OF DAILY LIVING (ADL): CURRENT_FUNCTION: NO ASSISTANCE NEEDED

## 2023-03-01 ASSESSMENT — PAIN SCALES - GENERAL: PAINLEVEL: NO PAIN (0)

## 2023-03-01 NOTE — PROGRESS NOTES
"SUBJECTIVE:   Anuel is a 72 year old who presents for Preventive Visit.    Patient has been advised of split billing requirements and indicates understanding: Yes  Are you in the first 12 months of your Medicare coverage?  No    Healthy Habits:     In general, how would you rate your overall health?  Good    Frequency of exercise:  2-3 days/week    Duration of exercise:  Other    Do you usually eat at least 4 servings of fruit and vegetables a day, include whole grains    & fiber and avoid regularly eating high fat or \"junk\" foods?  No    Taking medications regularly:  Yes    Medication side effects:  Not applicable    Ability to successfully perform activities of daily living:  No assistance needed    Home Safety:  No safety concerns identified    Hearing Impairment:  No hearing concerns    In the past 6 months, have you been bothered by leaking of urine? Yes    In general, how would you rate your overall mental or emotional health?  Excellent      PHQ-2 Total Score: 0    Additional concerns today:  No      Have you ever done Advance Care Planning? (For example, a Health Directive, POLST, or a discussion with a medical provider or your loved ones about your wishes): Yes, advance care planning is on file.       Fall risk  Fallen 2 or more times in the past year?: No  Any fall with injury in the past year?: No    Cognitive Screening   1) Repeat 3 items (Leader, Season, Winter)    2) Clock draw: NORMAL  3) 3 item recall: Recalls 2 objects   Results: NORMAL clock, 1-2 items recalled: COGNITIVE IMPAIRMENT LESS LIKELY    Mini-CogTM Copyright [employer]. Licensed by the author for use in [facility]; reprinted with permission (queta@.[email or web address]). All rights reserved.      Do you have sleep apnea, excessive snoring or daytime drowsiness?: no    Reviewed and updated as needed this visit by clinical staff   Tobacco  Allergies    Med Hx  Surg Hx  Fam Hx  Soc Hx        Reviewed and updated as needed this visit by " Provider                 Social History     Tobacco Use     Smoking status: Never     Smokeless tobacco: Never   Substance Use Topics     Alcohol use: Yes     Alcohol/week: 0.0 standard drinks     Comment: Alcoholic Drinks/day: occasionally         Alcohol Use 2/28/2023   Prescreen: >3 drinks/day or >7 drinks/week? No     Review current opioid prescriptions    For a patient with a current opioid prescription:    Reviewed potential Opioid Use Disorder (OUD) risk factors: No has none    Evaluate their pain severity and current treatment plan:     Provide information on non-opioid treatment options:    Refer to a specialist, as appropriate:    Get more information on pain management in the [facility] Pain Management Best Practices Inter-agency Task Force Report    Screen for potential Substance Use Disorders (SUDs)    Reviewed the patient s potential risk factors for SUDs: No , has none    Refer to treatment or specialist, as appropriate:     A screening tool isn t required but you may use one:  Find more information in the National [employer] on Drug Abuse Screening and Assessment Tools Chart              Current providers sharing in care for this patient include:    Patient Care Team:  Fady Penaloza MD as PCP - General (Family Practice)  Fady Penaloza MD as Assigned PCP  Art Liu MD as Assigned Surgical Provider    The following health maintenance items are reviewed in Epic and correct as of today:  Health Maintenance   Topic Date Due     MICROALBUMIN  Never done     HEPATITIS C SCREENING  Never done     ZOSTER IMMUNIZATION (2 of 3) 03/12/2010     BMP  02/28/2023     LIPID  02/28/2023     TSH W/FREE T4 REFLEX  02/28/2023     HEMOGLOBIN  02/28/2023     MEDICARE ANNUAL WELLNESS VISIT  03/01/2024     FALL RISK ASSESSMENT  03/01/2024     COLORECTAL CANCER SCREENING  11/16/2025     ADVANCE CARE PLANNING  03/01/2028     DTAP/TDAP/TD IMMUNIZATION (3 - Td or Tdap) 02/04/2032     PHQ-2 (once per calendar year)  Completed      INFLUENZA VACCINE  Completed     Pneumococcal Vaccine: 65+ Years  Completed     URINALYSIS  Completed     COVID-19 Vaccine  Completed     IPV IMMUNIZATION  Aged Out     MENINGITIS IMMUNIZATION  Aged Out     AORTIC ANEURYSM SCREENING (SYSTEM ASSIGNED)  Discontinued     Labs reviewed in [facility]  Current Outpatient Medications   Medication Sig Dispense Refill     acyclovir (ZOVIRAX) 400 MG tablet Take 1 tablet (400 mg) by mouth daily 90 tablet 4     amLODIPine (NORVASC) 10 MG tablet Take 1 tablet (10 mg) by mouth daily 90 tablet 4     chlorthalidone (HYGROTON) 25 MG tablet Take 1 tablet (25 mg) by mouth daily 90 tablet 3     cholecalciferol (VITAMIN D3) 5000 units TABS tablet Take by mouth daily       levothyroxine (SYNTHROID/LEVOTHROID) 150 MCG tablet Take 1 tablet (150 mcg) by mouth daily (with breakfast) 90 tablet 4     losartan (COZAAR) 100 MG tablet Take 1 tablet (100 mg) by mouth daily 90 tablet 4     omeprazole (PRILOSEC) 20 MG DR capsule Take 1 capsule (20 mg) by mouth daily 90 capsule 4     No Known Allergies          Review of Systems   Constitutional: Negative for chills and fever.   HENT: Negative for congestion, ear pain, hearing loss and sore throat.    Eyes: Negative for pain and visual disturbance.   Respiratory: Negative for cough and shortness of breath.    Cardiovascular: Negative for chest pain, palpitations and peripheral edema.   Gastrointestinal: Negative for abdominal pain, constipation, diarrhea, heartburn, hematochezia and nausea.   Genitourinary: Positive for impotence. Negative for dysuria, frequency, genital sores, hematuria, penile discharge and urgency.   Musculoskeletal: Positive for arthralgias. Negative for joint swelling and myalgias.   Skin: Negative for rash.   Neurological: Negative for dizziness, weakness, headaches and paresthesias.   Psychiatric/Behavioral: Negative for mood changes. The patient is not nervous/anxious.      He had elevated BP over the last several months was seen  "and meds increased, the numbers are coming down.         OBJECTIVE:   /80   Pulse 50   Temp 97.4  F (36.3  C) (Tympanic)   Resp 16   Ht 1.937 m (6' 4.25\")   Wt 99.1 kg (218 lb 6.4 oz)   SpO2 98%   BMI 26.41 kg/m   Estimated body mass index is 26.41 kg/m  as calculated from the following:    Height as of this encounter: 1.937 m (6' 4.25\").    Weight as of this encounter: 99.1 kg (218 lb 6.4 oz).  Physical Exam  GENERAL: healthy, alert and no distress  EYES: Eyes grossly normal to inspection, PERRL and conjunctivae and sclerae normal  HENT: ear canals and TM's normal, nose and mouth without ulcers or lesions  NECK: no adenopathy, no asymmetry, masses, or scars and thyroid normal to palpation  RESP: lungs clear to auscultation - no rales, rhonchi or wheezes  CV: regular rate and rhythm, normal S1 S2, no S3 or S4, no murmur, click or rub, no peripheral edema and peripheral pulses strong  ABDOMEN: soft, nontender, no hepatosplenomegaly, no masses and bowel sounds normal  MS: no gross musculoskeletal defects noted, no edema  SKIN: no suspicious lesions or rashes  NEURO: Normal strength and tone, mentation intact and speech normal  PSYCH: mentation appears normal, affect normal/bright    Diagnostic Test Results:  Labs reviewed in Epic  Results for orders placed or performed in visit on 03/01/23   Hemoglobin     Status: Normal   Result Value Ref Range    Hemoglobin 14.5 13.3 - 17.7 g/dL   Thyrotropin GH     Status: Normal   Result Value Ref Range    TSH 1.50 0.30 - 4.20 uIU/mL   Lipid Profile     Status: Abnormal   Result Value Ref Range    Cholesterol 175 <200 mg/dL    Triglycerides 61 <150 mg/dL    Direct Measure HDL 62 >=40 mg/dL    LDL Cholesterol Calculated 101 (H) <=100 mg/dL    Non HDL Cholesterol 113 <130 mg/dL    Narrative    Cholesterol  Desirable:  <200 mg/dL    Triglycerides  Normal:  Less than 150 mg/dL  Borderline High:  150-199 mg/dL  High:  200-499 mg/dL  Very High:  Greater than or equal to 500 " mg/dL    Direct Measure HDL  Female:  Greater than or equal to 50 mg/dL   Male:  Greater than or equal to 40 mg/dL    LDL Cholesterol  Desirable:  <100mg/dL  Above Desirable:  100-129 mg/dL   Borderline High:  130-159 mg/dL   High:  160-189 mg/dL   Very High:  >= 190 mg/dL    Non HDL Cholesterol  Desirable:  130 mg/dL  Above Desirable:  130-159 mg/dL  Borderline High:  160-189 mg/dL  High:  190-219 mg/dL  Very High:  Greater than or equal to 220 mg/dL   BASIC METABOLIC PANEL     Status: Abnormal   Result Value Ref Range    Sodium 139 136 - 145 mmol/L    Potassium 4.1 3.4 - 5.3 mmol/L    Chloride 105 98 - 107 mmol/L    Carbon Dioxide (CO2) 25 22 - 29 mmol/L    Anion Gap 9 7 - 15 mmol/L    Urea Nitrogen 30.4 (H) 8.0 - 23.0 mg/dL    Creatinine 1.81 (H) 0.67 - 1.17 mg/dL    Calcium 11.2 (H) 8.8 - 10.2 mg/dL    Glucose 117 (H) 70 - 99 mg/dL    GFR Estimate 39 (L) >60 mL/min/1.73m2   Albumin Random Urine Quantitative with Creat Ratio     Status: Abnormal   Result Value Ref Range    Creatinine Urine mg/dL 134.7 mg/dL    Albumin Urine mg/L 56.7 mg/L    Albumin Urine mg/g Cr 42.09 (H) 0.00 - 17.00 mg/g Cr         ASSESSMENT / PLAN:       ICD-10-CM    1. Encounter for Medicare annual wellness exam  Z00.00       2. Stage 3a chronic kidney disease (H)  N18.31 Albumin Random Urine Quantitative with Creat Ratio     BASIC METABOLIC PANEL     Hemoglobin      3. Need for hepatitis C screening test  Z11.59 Hepatitis C Screen Reflex to HCV RNA Quant and Genotype      4. Screening for hyperlipidemia  Z13.220 Lipid Profile      5. Disturbance of skin sensation  R20.9 acyclovir (ZOVIRAX) 400 MG tablet      6. Hypothyroidism, unspecified type  E03.9 Thyrotropin GH     levothyroxine (SYNTHROID/LEVOTHROID) 150 MCG tablet      7. Gastroesophageal reflux disease without esophagitis  K21.9 omeprazole (PRILOSEC) 20 MG DR capsule      8. Essential hypertension  I10 losartan (COZAAR) 100 MG tablet     chlorthalidone (HYGROTON) 25 MG tablet      "amLODIPine (NORVASC) 10 MG tablet        Blood pressure is improved but not at goal yet. I want it under 135/85. So increased norvasc from 5 milligram to 10 milligram.  Follow up in 6-8 weeks or so.  Regarding his kidneys, discussed with him reducing the PPI further, with a goal of tapering. He is at every other day currently and willing to go down even more. Uses no NSAIDS.             COUNSELING:  Reviewed preventive health counseling, as reflected in patient instructions       Regular exercise       Healthy diet/nutrition       Colon cancer screening      BMI:   Estimated body mass index is 26.41 kg/m  as calculated from the following:    Height as of this encounter: 1.937 m (6' 4.25\").    Weight as of this encounter: 99.1 kg (218 lb 6.4 oz).         He reports that he has never smoked. He has never used smokeless tobacco.      Appropriate preventive services were discussed with this patient, including applicable screening as appropriate for cardiovascular disease, diabetes, osteopenia/osteoporosis, and glaucoma.  As appropriate for age/gender, discussed screening for colorectal cancer, prostate cancer, breast cancer, and cervical cancer. Checklist reviewing preventive services available has been given to the patient.    Reviewed patients plan of care and provided an AVS. The Basic Care Plan (routine screening as documented in Health Maintenance) for David meets the Care Plan requirement. This Care Plan has been established and reviewed with the Patient.          Fady Penaloza MD  [facility]    Identified Health Risks:  "

## 2023-03-01 NOTE — NURSING NOTE
"Chief Complaint   Patient presents with     Medicare Visit       Initial /80   Pulse 50   Temp 97.4  F (36.3  C) (Tympanic)   Resp 16   Ht 1.937 m (6' 4.25\")   Wt 99.1 kg (218 lb 6.4 oz)   SpO2 98%   BMI 26.41 kg/m   Estimated body mass index is 26.41 kg/m  as calculated from the following:    Height as of this encounter: 1.937 m (6' 4.25\").    Weight as of this encounter: 99.1 kg (218 lb 6.4 oz).  Medication Reconciliation: complete    FOOD SECURITY SCREENING QUESTIONS  Hunger Vital Signs:  Within the past 12 months we worried whether our food would run out before we got money to buy more. Never  Within the past 12 months the food we bought just didn't last and we didn't have money to get more. Never  Duyen Zepeda LPN 3/1/2023 8:43 AM      "

## 2023-03-01 NOTE — PATIENT INSTRUCTIONS
Patient Education   Personalized Prevention Plan  You are due for the preventive services outlined below.  Your care team is available to assist you in scheduling these services.  If you have already completed any of these items, please share that information with your care team to update in your medical record.  Health Maintenance Due   Topic Date Due     Kidney Microalbumin Urine Test  Never done     Hepatitis C Screening  Never done     Zoster (Shingles) Vaccine (2 of 3) 03/12/2010     Basic Metabolic Panel  02/28/2023     Cholesterol Lab  02/28/2023     Thyroid Function Lab  02/28/2023     Hemoglobin  02/28/2023       Understanding USDA MyPlate  The USDA has guidelines to help you make healthy food choices. These are called MyPlate. MyPlate shows the food groups that make up healthy meals using the image of a place setting. Before you eat, think about the healthiest choices for what to put on your plate or in your cup or bowl. To learn more about building a healthy plate, visit www.choosemyplate.gov.    The food groups    Fruits. Any fruit or 100% fruit juice counts as part of the Fruit Group. Fruits may be fresh, canned, frozen, or dried, and may be whole, cut-up, or pureed. Make 1/2 of your plate fruits and vegetables.    Vegetables. Any vegetable or 100% vegetable juice counts as a member of the Vegetable Group. Vegetables may be fresh, frozen, canned, or dried. They can be served raw or cooked and may be whole, cut-up, or mashed. Make 1/2 of your plate fruits and vegetables.    Grains. All foods made from grains are part of the Grains Group. These include wheat, rice, oats, cornmeal, and barley. Grains are often used to make foods such as bread, pasta, oatmeal, cereal, tortillas, and grits. Grains should be no more than 1/4 of your plate. At least half of your grains should be whole grains.    Protein. This group includes meat, poultry, seafood, beans and peas, eggs, processed soy products (such as tofu),  nuts (including nut butters), and seeds. Make protein choices no more than 1/4 of your plate. Meat and poultry choices should be lean or low fat.    Dairy. The Dairy Group includes all fluid milk products and foods made from milk that contain calcium, such as yogurt and cheese. (Foods that have little calcium, such as cream, butter, and cream cheese, are not part of this group.) Most dairy choices should be low-fat or fat-free.    Oils. Oils aren't a food group, but they do contain essential nutrients. However it's important to watch your intake of oils. These are fats that are liquid at room temperature. They include canola, corn, olive, soybean, vegetable, and sunflower oil. Foods that are mainly oil include mayonnaise, certain salad dressings, and soft margarines. You likely already get your daily oil allowance from the foods you eat.  Things to limit  Eating healthy also means limiting these things in your diet:       Salt (sodium). Many processed foods have a lot of sodium. To keep sodium intake down, eat fresh vegetables, meats, poultry, and seafood when possible. Purchase low-sodium, reduced-sodium, or no-salt-added food products at the store. And don't add salt to your meals at home. Instead, season them with herbs and spices such as dill, oregano, cumin, and paprika. Or try adding flavor with lemon or lime zest and juice.    Saturated fat. Saturated fats are most often found in animal products such as beef, pork, and chicken. They are often solid at room temperature, such as butter. To reduce your saturated fat intake, choose leaner cuts of meat and poultry. And try healthier cooking methods such as grilling, broiling, roasting, or baking. For a simple lower-fat swap, use plain nonfat yogurt instead of mayonnaise when making potato salad or macaroni salad.    Added sugars. These are sugars added to foods. They are in foods such as ice cream, candy, soda, fruit drinks, sports drinks, energy drinks, cookies,  pastries, jams, and syrups. Cut down on added sugars by sharing sweet treats with a family member or friend. You can also choose fruit for dessert, and drink water or other unsweetened beverages.     [employer] last reviewed this educational content on 6/1/2020 2000-2021 [employer]. All rights reserved. This information is not intended as a substitute for professional medical care. Always follow your healthcare professional's instructions.          Urinary Incontinence (Male)    Urinary incontinence means not being able to control the release of urine from the bladder.   Causes  Common causes of urinary incontinence in men include:    Infection    Certain medicines    Aging    Poor pelvic muscle tone    Bladder spasms    Obesity    Trouble urinating and fully emptying the bladder (urinary retention)  Other things that can cause incontinence are:     Nervous system diseases    Diabetes    Sleep apnea    Urinary tract infections    Prostate surgery    Pelvic injury  Constipation and smoking have also been identified as risk factors.   Symptoms    Urge incontinence (overactive bladder). This is a sudden urge to urinate. It occurs even though there may not be much urine in the bladder. The need to urinate often during the night is common. It's due to bladder spasms.    Stress incontinence. This is urine leakage that you can't control. It can occur with sneezing, coughing, and other actions that put stress on the bladder.    Treatment  Treatment depends on what is causing the condition. Bladder infections are treated with antibiotics. Urinary retention is treated with a bladder catheter.   Home care  Follow these guidelines when caring for yourself at home:    Don't have any foods and drinks that may irritate the bladder. This includes:  ? Chocolate  ? Alcohol  ? Caffeine  ? Carbonated drinks  ? Acidic fruits and juices    Limit fluids to 6 to 8 cups a day.    Lose weight if you are overweight. This will  reduce your symptoms.    If advised, do regular pelvic muscle-strengthening exercises such as Kegel exercises.    If needed, wear absorbent pads to catch urine. Change the pads often. This is for good hygiene and to prevent skin and bladder infections.    Bathe daily for good hygiene.    If an antibiotic was prescribed to treat a bladder infection, take it until it's finished. Keep taking it even if you are feeling better. This is to make sure your infection has cleared.    If a catheter was left in place, keep bacteria from getting into the collection bag. Don't disconnect the catheter from the collection bag.    Use a leg band to secure the catheter drainage tube, so it does not pull on the catheter. Drain the collection bag when it becomes full. To do this, use the drain spout at the bottom of the bag. Don't disconnect the bag from the catheter.    Don't pull on or try to remove a catheter. The catheter must be removed by a healthcare provider.    If you smoke, stop. Ask your provider for help if you can't do this on your own.  Follow-up care  Follow up with your healthcare provider, or as advised.  When to get medical advice  Call your healthcare provider right away if any of these occur:    Fever over 100.4 F (38 C), or as directed by your provider    Bladder pain or fullness    Belly swelling, nausea, or vomiting    Back pain    Weakness, dizziness, or fainting    If a catheter was left in place, return if:  ? The catheter falls out  ? The catheter stops draining for 6 hours  ? Your urine gets cloudy or smells bad  [employer] last reviewed this educational content on 1/1/2020 2000-2021 [employer]. All rights reserved. This information is not intended as a substitute for professional medical care. Always follow your healthcare professional's instructions.

## 2023-03-03 LAB — HCV AB SERPL QL IA: NONREACTIVE

## 2023-03-03 RX ORDER — CHLORTHALIDONE 25 MG/1
TABLET ORAL
Qty: 30 TABLET | OUTPATIENT
Start: 2023-03-03

## 2023-03-03 NOTE — TELEPHONE ENCOUNTER
chlorthalidone (HYGROTON) 25 MG tablet 90 tablet 3 3/1/2023  No   Sig - Route: Take 1 tablet (25 mg) by mouth daily - Oral   Sent to pharmacy as: Chlorthalidone 25 MG Oral Tablet (HYGROTON)   Class: E-Prescribe   Order: [id number]   E-Prescribing Status: Receipt confirmed by pharmacy (3/1/2023  8:56 AM CST)     [facility] - [address]     Pharmacy notified. Kiana Bianchi RN .............. 3/3/2023  3:13 PM

## 2023-03-30 ENCOUNTER — MYC MEDICAL ADVICE (OUTPATIENT)
Dept: FAMILY MEDICINE | Facility: OTHER | Age: 73
End: 2023-03-30
Payer: COMMERCIAL

## 2023-03-30 NOTE — TELEPHONE ENCOUNTER
Routing to ensure patient reads the [employer] message by tomorrow. Queenie Silverman RN on 3/30/2023 at 4:34 PM

## 2023-03-31 NOTE — TELEPHONE ENCOUNTER
Spoke with patient who did verbalize understanding in correct medication use with no further questions or concerns    Corinne R Thayer, RN on 3/31/2023 at 11:26 AM

## 2023-04-12 ASSESSMENT — ENCOUNTER SYMPTOMS
NERVOUS/ANXIOUS: 1
DIZZINESS: 1

## 2023-04-12 ASSESSMENT — ACTIVITIES OF DAILY LIVING (ADL): CURRENT_FUNCTION: NO ASSISTANCE NEEDED

## 2023-04-13 ENCOUNTER — OFFICE VISIT (OUTPATIENT)
Dept: FAMILY MEDICINE | Facility: OTHER | Age: 73
End: 2023-04-13
Attending: FAMILY MEDICINE
Payer: COMMERCIAL

## 2023-04-13 VITALS
WEIGHT: 219.2 LBS | DIASTOLIC BLOOD PRESSURE: 64 MMHG | BODY MASS INDEX: 26.69 KG/M2 | RESPIRATION RATE: 12 BRPM | TEMPERATURE: 97.1 F | HEIGHT: 76 IN | SYSTOLIC BLOOD PRESSURE: 110 MMHG | OXYGEN SATURATION: 98 % | HEART RATE: 64 BPM

## 2023-04-13 DIAGNOSIS — I10 ESSENTIAL HYPERTENSION: Primary | ICD-10-CM

## 2023-04-13 PROCEDURE — G0463 HOSPITAL OUTPT CLINIC VISIT: HCPCS

## 2023-04-13 PROCEDURE — 99213 OFFICE O/P EST LOW 20 MIN: CPT | Performed by: FAMILY MEDICINE

## 2023-04-13 RX ORDER — LOSARTAN POTASSIUM 50 MG/1
50 TABLET ORAL DAILY
Qty: 90 TABLET | Refills: 4 | Status: SHIPPED | OUTPATIENT
Start: 2023-04-13 | End: 2023-06-01

## 2023-04-13 ASSESSMENT — ACTIVITIES OF DAILY LIVING (ADL): CURRENT_FUNCTION: NO ASSISTANCE NEEDED

## 2023-04-13 ASSESSMENT — PAIN SCALES - GENERAL: PAINLEVEL: NO PAIN (0)

## 2023-04-13 ASSESSMENT — ENCOUNTER SYMPTOMS
DIZZINESS: 1
NERVOUS/ANXIOUS: 1

## 2023-04-13 NOTE — NURSING NOTE
"Chief Complaint   Patient presents with     Recheck Medication       Initial /64   Pulse 64   Temp 97.1  F (36.2  C) (Tympanic)   Resp 12   Ht 1.937 m (6' 4.25\")   Wt 99.4 kg (219 lb 3.2 oz)   SpO2 98%   BMI 26.51 kg/m   Estimated body mass index is 26.51 kg/m  as calculated from the following:    Height as of this encounter: 1.937 m (6' 4.25\").    Weight as of this encounter: 99.4 kg (219 lb 3.2 oz).  Medication Reconciliation: complete    FOOD SECURITY SCREENING QUESTIONS  Hunger Vital Signs:  Within the past 12 months we worried whether our food would run out before we got money to buy more. Never  Within the past 12 months the food we bought just didn't last and we didn't have money to get more. Never  Duyen Zepeda LPN 4/13/2023 1:05 PM      "

## 2023-04-13 NOTE — PROGRESS NOTES
"  Assessment & Plan     (I10) Essential hypertension  (primary encounter diagnosis)  Comment: he is at goal, but experiencing lows with symptoms. He wants to cut back a bit on meds. He has 50 milligram losartan so will pull this back from 100 milligram.   Plan: follow up as needed                  No follow-ups on file.    Fady Penaloza MD  [facility]    Subjective   Anuel is a 72 year old, presenting for the following health issues:  Recheck Medication        4/13/2023     1:01 PM   Additional Questions   Roomed by ALEJANDRO Geller   Accompanied by Self         4/13/2023     1:01 PM   Patient Reported Additional Medications   Patient reports taking the following new medications N/A     Healthy Habits:     In general, how would you rate your overall health?  Good    Frequency of exercise:  2-3 days/week    Duration of exercise:  15-30 minutes    Do you usually eat at least 4 servings of fruit and vegetables a day, include whole grains    & fiber and avoid regularly eating high fat or \"junk\" foods?  No    Taking medications regularly:  Yes    Ability to successfully perform activities of daily living:  No assistance needed    Home Safety:  No safety concerns identified    Hearing Impairment:  No hearing concerns    In the past 6 months, have you been bothered by leaking of urine? Yes    In general, how would you rate your overall mental or emotional health?  Good      PHQ-2 Total Score: 0    Additional concerns today:  No     Hypertension follow up. norvasc was increased at the last visit. Often in the morning he feels lightheaded when he gets out of bed. This morning was running 112/58. Has had diastolic readings into the 40's. Is now almost daily. [facility]. Was 140/xx initially, then later was 120/xx. Is stressed, sold his home and moving to [place]. Closes on house in 2 weeks.            Review of Systems   Neurological: Positive for dizziness.   Psychiatric/Behavioral: The patient is " "nervous/anxious.             Objective    /64   Pulse 64   Temp 97.1  F (36.2  C) (Tympanic)   Resp 12   Ht 1.937 m (6' 4.25\")   Wt 99.4 kg (219 lb 3.2 oz)   SpO2 98%   BMI 26.51 kg/m    Body mass index is 26.51 kg/m .  Physical Exam  Constitutional:       Appearance: Normal appearance.   Cardiovascular:      Rate and Rhythm: Normal rate and regular rhythm.   Pulmonary:      Effort: Pulmonary effort is normal. No respiratory distress.      Breath sounds: No stridor.   Neurological:      Mental Status: He is alert.   Psychiatric:         Mood and Affect: Mood normal.         Behavior: Behavior normal.         Thought Content: Thought content normal.                            "

## 2023-08-09 DIAGNOSIS — I10 ESSENTIAL HYPERTENSION: ICD-10-CM

## 2023-08-09 RX ORDER — LOSARTAN POTASSIUM 50 MG/1
50 TABLET ORAL DAILY
Qty: 90 TABLET | Refills: 4 | Status: SHIPPED | OUTPATIENT
Start: 2023-08-09 | End: 2024-04-16

## 2023-09-15 ENCOUNTER — OFFICE VISIT (OUTPATIENT)
Dept: FAMILY MEDICINE | Facility: CLINIC | Age: 73
End: 2023-09-15
Payer: COMMERCIAL

## 2023-09-15 VITALS
DIASTOLIC BLOOD PRESSURE: 82 MMHG | BODY MASS INDEX: 26.91 KG/M2 | OXYGEN SATURATION: 98 % | HEART RATE: 72 BPM | SYSTOLIC BLOOD PRESSURE: 134 MMHG | TEMPERATURE: 97.5 F | HEIGHT: 76 IN | WEIGHT: 221 LBS | RESPIRATION RATE: 16 BRPM

## 2023-09-15 DIAGNOSIS — I10 ESSENTIAL HYPERTENSION: ICD-10-CM

## 2023-09-15 DIAGNOSIS — N18.32 STAGE 3B CHRONIC KIDNEY DISEASE (H): ICD-10-CM

## 2023-09-15 DIAGNOSIS — E83.52 HYPERCALCEMIA: ICD-10-CM

## 2023-09-15 DIAGNOSIS — R73.01 IMPAIRED FASTING GLUCOSE: ICD-10-CM

## 2023-09-15 DIAGNOSIS — Z00.00 HEALTH CARE MAINTENANCE: Primary | ICD-10-CM

## 2023-09-15 DIAGNOSIS — Z23 NEED FOR VACCINATION: ICD-10-CM

## 2023-09-15 DIAGNOSIS — G56.03 BILATERAL CARPAL TUNNEL SYNDROME: ICD-10-CM

## 2023-09-15 DIAGNOSIS — E03.9 ACQUIRED HYPOTHYROIDISM: ICD-10-CM

## 2023-09-15 PROBLEM — N25.9 DISORDER RESULTING FROM IMPAIRED RENAL FUNCTION: Status: RESOLVED | Noted: 2018-02-22 | Resolved: 2023-09-15

## 2023-09-15 PROBLEM — N40.1 BPH WITH URINARY OBSTRUCTION: Status: RESOLVED | Noted: 2019-07-30 | Resolved: 2023-09-15

## 2023-09-15 PROBLEM — N13.8 BPH WITH URINARY OBSTRUCTION: Status: RESOLVED | Noted: 2019-07-30 | Resolved: 2023-09-15

## 2023-09-15 LAB
ANION GAP SERPL CALCULATED.3IONS-SCNC: 11 MMOL/L (ref 7–15)
BASOPHILS # BLD AUTO: 0.1 10E3/UL (ref 0–0.2)
BASOPHILS NFR BLD AUTO: 1 %
BUN SERPL-MCNC: 34.3 MG/DL (ref 8–23)
CALCIUM SERPL-MCNC: 10.8 MG/DL (ref 8.8–10.2)
CALCIUM UR-MCNC: 9.3 MG/DL
CALCIUM/CREAT UR: 0.07 G/G CR (ref 0.05–0.27)
CHLORIDE SERPL-SCNC: 106 MMOL/L (ref 98–107)
CREAT SERPL-MCNC: 1.92 MG/DL (ref 0.67–1.17)
CREAT UR-MCNC: 143 MG/DL
DEPRECATED HCO3 PLAS-SCNC: 23 MMOL/L (ref 22–29)
EGFRCR SERPLBLD CKD-EPI 2021: 36 ML/MIN/1.73M2
EOSINOPHIL # BLD AUTO: 0.2 10E3/UL (ref 0–0.7)
EOSINOPHIL NFR BLD AUTO: 2 %
ERYTHROCYTE [DISTWIDTH] IN BLOOD BY AUTOMATED COUNT: 13 % (ref 10–15)
GLUCOSE SERPL-MCNC: 105 MG/DL (ref 70–99)
HBA1C MFR BLD: 5.5 % (ref 0–5.6)
HCT VFR BLD AUTO: 36.8 % (ref 40–53)
HGB BLD-MCNC: 12.6 G/DL (ref 13.3–17.7)
IMM GRANULOCYTES # BLD: 0 10E3/UL
IMM GRANULOCYTES NFR BLD: 0 %
LYMPHOCYTES # BLD AUTO: 1.1 10E3/UL (ref 0.8–5.3)
LYMPHOCYTES NFR BLD AUTO: 13 %
MCH RBC QN AUTO: 30.1 PG (ref 26.5–33)
MCHC RBC AUTO-ENTMCNC: 34.2 G/DL (ref 31.5–36.5)
MCV RBC AUTO: 88 FL (ref 78–100)
MONOCYTES # BLD AUTO: 0.6 10E3/UL (ref 0–1.3)
MONOCYTES NFR BLD AUTO: 7 %
NEUTROPHILS # BLD AUTO: 6.4 10E3/UL (ref 1.6–8.3)
NEUTROPHILS NFR BLD AUTO: 77 %
PLATELET # BLD AUTO: 283 10E3/UL (ref 150–450)
POTASSIUM SERPL-SCNC: 4.4 MMOL/L (ref 3.4–5.3)
PTH-INTACT SERPL-MCNC: 81 PG/ML (ref 15–65)
RBC # BLD AUTO: 4.18 10E6/UL (ref 4.4–5.9)
SODIUM SERPL-SCNC: 140 MMOL/L (ref 136–145)
TOTAL PROTEIN SERUM FOR ELP: 6.6 G/DL (ref 6.4–8.3)
WBC # BLD AUTO: 8.3 10E3/UL (ref 4–11)

## 2023-09-15 PROCEDURE — 90662 IIV NO PRSV INCREASED AG IM: CPT | Performed by: INTERNAL MEDICINE

## 2023-09-15 PROCEDURE — G0008 ADMIN INFLUENZA VIRUS VAC: HCPCS | Performed by: INTERNAL MEDICINE

## 2023-09-15 PROCEDURE — 85025 COMPLETE CBC W/AUTO DIFF WBC: CPT | Mod: QW | Performed by: INTERNAL MEDICINE

## 2023-09-15 PROCEDURE — 82306 VITAMIN D 25 HYDROXY: CPT | Performed by: INTERNAL MEDICINE

## 2023-09-15 PROCEDURE — 84155 ASSAY OF PROTEIN SERUM: CPT | Performed by: INTERNAL MEDICINE

## 2023-09-15 PROCEDURE — 83970 ASSAY OF PARATHORMONE: CPT | Performed by: INTERNAL MEDICINE

## 2023-09-15 PROCEDURE — 99214 OFFICE O/P EST MOD 30 MIN: CPT | Mod: 25 | Performed by: INTERNAL MEDICINE

## 2023-09-15 PROCEDURE — 82340 ASSAY OF CALCIUM IN URINE: CPT | Performed by: INTERNAL MEDICINE

## 2023-09-15 PROCEDURE — 36415 COLL VENOUS BLD VENIPUNCTURE: CPT | Performed by: INTERNAL MEDICINE

## 2023-09-15 PROCEDURE — 80048 BASIC METABOLIC PNL TOTAL CA: CPT | Performed by: INTERNAL MEDICINE

## 2023-09-15 PROCEDURE — 83036 HEMOGLOBIN GLYCOSYLATED A1C: CPT | Performed by: INTERNAL MEDICINE

## 2023-09-15 PROCEDURE — 84165 PROTEIN E-PHORESIS SERUM: CPT

## 2023-09-15 NOTE — PROGRESS NOTES
"  Assessment & Plan   Problem List Items Addressed This Visit          Nervous and Auditory    Bilateral carpal tunnel syndrome     Bilateral symptoms  - using wrist brace intermittently  - may need ortho-hand referral in the future            Endocrine    Hypothyroidism     H/o prior partial thyroidectomy  - on levothyroxine         Hypercalcemia     Ca running ~11s  - no stone history  9/2023 work-up:  - urine calcium/creatinine ratio 0.07 (on chlorthalidone)  - PTH 81 (in setting of eGFR <30mL/min)  - pending SPEP, vitamin D levels  - I suspect more a product of thiazide use; possible FHH; lower suspicions for parathyroid disease         Relevant Orders    Parathyroid Hormone Intact (Completed)    Vitamin D Deficiency    Protein electrophoresis    CBC with Platelets & Differential (Completed)    Calcium random urine with Creat Ratio (Completed)       Circulatory    Essential hypertension     current antihypertensive regimen: chlorthalidone 25mg daily, amlodipine 10mg daily, losartan 50mg daily  regimen changes: none  intolerance:   future titration/work-up plan:              Urinary    Chronic kidney disease, stage III (moderate) (H)     Baseline Scr 1.7-1.8; eGFR 35-40mL/min  - historically with mild albuminuria         Relevant Orders    Basic metabolic panel (Completed)       Other    Health care maintenance - Primary    Relevant Orders    REVIEW OF HEALTH MAINTENANCE PROTOCOL ORDERS (Completed)     Other Visit Diagnoses       Need for vaccination        Relevant Orders    INFLUENZA VACCINE 65+ (FLUZONE HD) (Completed)    Impaired fasting glucose        Relevant Orders    Hemoglobin A1c (Completed)                  BMI:   Estimated body mass index is 26.72 kg/m  as calculated from the following:    Height as of this encounter: 1.937 m (6' 4.25\").    Weight as of this encounter: 100.2 kg (221 lb).       FUTURE APPOINTMENTS:       - Follow-up visit in 6 months    Derrick Mckeon MD  [facility] - " "THOMAS Agee is a 73 year old, presenting for the following health issues: Presents to establish care.  Recently relocated from [place] living in [place] and planning to build house in [place] [facility] [place] [place].  Did follow through [place] system.  Describes history of more accelerated hypertension over the past year.  Has been introduced to chlorthalidone, amlodipine, losartan all within the last year.  Does monitor home blood pressures.  States that readings can be quite labile but generally sees home blood pressures more around 140s.  Does describe fairly regular symptoms of lightheadedness especially when doing things outside.  Describes bilateral wrist hand pains consistent with carpal tunnel syndrome -has not pursued any specialty input.  [facility] though does not receive any specialty care medications through the [facility] system.  Establish Care, Hypertension, and Musculoskeletal Problem (C/o finger numbness and tingling. Started last winter when working outside.)        9/15/2023    11:42 AM   Additional Questions   Roomed by Ofelia FAITH       History of Present Illness       Hypertension: He presents for follow up of hypertension.  He does not check blood pressure  regularly outside of the clinic. Outside blood pressures have been over 140/90. He follows a low salt diet.     Reason for visit:  Blood pressure, wrist pain    He eats 2-3 servings of fruits and vegetables daily.He consumes 1 sweetened beverage(s) daily.He exercises with enough effort to increase his heart rate 30 to 60 minutes per day.  He exercises with enough effort to increase his heart rate 3 or less days per week.   He is taking medications regularly.         Review of Systems   Constitutional, HEENT, cardiovascular, pulmonary, gi and gu systems are negative, except as otherwise noted.      Objective    BP (!) 146/87   Pulse 72   Temp 97.5  F (36.4  C)   Resp 16   Ht 1.937 m (6' 4.25\") "   Wt 100.2 kg (221 lb)   SpO2 98%   BMI 26.72 kg/m    Body mass index is 26.72 kg/m .  Physical Exam   GENERAL: healthy, alert and no distress  NECK: no adenopathy, no asymmetry, masses, or scars and thyroid normal to palpation  RESP: lungs clear to auscultation - no rales, rhonchi or wheezes  CV: regular rate and rhythm, normal S1 S2, no S3 or S4, no murmur, click or rub, no peripheral edema and peripheral pulses strong  ABDOMEN: soft, nontender, no hepatosplenomegaly, no masses and bowel sounds normal  MS: no gross musculoskeletal defects noted, no edema

## 2023-09-18 PROBLEM — E83.52 HYPERCALCEMIA: Status: ACTIVE | Noted: 2023-09-18

## 2023-09-18 PROBLEM — G56.03 BILATERAL CARPAL TUNNEL SYNDROME: Status: ACTIVE | Noted: 2023-09-18

## 2023-09-18 LAB
ALBUMIN SERPL ELPH-MCNC: 4 G/DL (ref 3.7–5.1)
ALPHA1 GLOB SERPL ELPH-MCNC: 0.3 G/DL (ref 0.2–0.4)
ALPHA2 GLOB SERPL ELPH-MCNC: 0.7 G/DL (ref 0.5–0.9)
B-GLOBULIN SERPL ELPH-MCNC: 0.8 G/DL (ref 0.6–1)
GAMMA GLOB SERPL ELPH-MCNC: 0.8 G/DL (ref 0.7–1.6)
M PROTEIN SERPL ELPH-MCNC: 0 G/DL
PROT PATTERN SERPL ELPH-IMP: NORMAL

## 2023-09-18 NOTE — ASSESSMENT & PLAN NOTE
Ca running ~11s  - no stone history  9/2023 work-up:  - urine calcium/creatinine ratio 0.07 (on chlorthalidone)  - PTH 81 (in setting of eGFR <30mL/min)  - pending SPEP, vitamin D levels  - I suspect more a product of thiazide use; possible FHH; lower suspicions for parathyroid disease

## 2023-09-18 NOTE — ASSESSMENT & PLAN NOTE
Bilateral symptoms  - using wrist brace intermittently  - may need ortho-hand referral in the future

## 2023-09-18 NOTE — ASSESSMENT & PLAN NOTE
current antihypertensive regimen: chlorthalidone 25mg daily, amlodipine 10mg daily, losartan 50mg daily  regimen changes: none  intolerance:   future titration/work-up plan:

## 2023-09-20 LAB — DEPRECATED CALCIDIOL+CALCIFEROL SERPL-MC: 66 UG/L (ref 20–75)

## 2023-11-30 ENCOUNTER — PATIENT OUTREACH (OUTPATIENT)
Dept: GASTROENTEROLOGY | Facility: CLINIC | Age: 73
End: 2023-11-30
Payer: COMMERCIAL

## 2024-04-01 ENCOUNTER — OFFICE VISIT (OUTPATIENT)
Dept: FAMILY MEDICINE | Facility: CLINIC | Age: 74
End: 2024-04-01
Payer: COMMERCIAL

## 2024-04-01 VITALS
BODY MASS INDEX: 26.22 KG/M2 | TEMPERATURE: 97.6 F | SYSTOLIC BLOOD PRESSURE: 136 MMHG | HEIGHT: 77 IN | OXYGEN SATURATION: 99 % | DIASTOLIC BLOOD PRESSURE: 86 MMHG | HEART RATE: 60 BPM | WEIGHT: 222.1 LBS | RESPIRATION RATE: 18 BRPM

## 2024-04-01 DIAGNOSIS — Z01.818 PREOP GENERAL PHYSICAL EXAM: Primary | ICD-10-CM

## 2024-04-01 DIAGNOSIS — K21.9 GASTROESOPHAGEAL REFLUX DISEASE WITHOUT ESOPHAGITIS: ICD-10-CM

## 2024-04-01 DIAGNOSIS — H26.9 CATARACT OF BOTH EYES, UNSPECIFIED CATARACT TYPE: ICD-10-CM

## 2024-04-01 DIAGNOSIS — I10 ESSENTIAL HYPERTENSION: ICD-10-CM

## 2024-04-01 DIAGNOSIS — N18.32 STAGE 3B CHRONIC KIDNEY DISEASE (H): ICD-10-CM

## 2024-04-01 DIAGNOSIS — E03.9 HYPOTHYROIDISM, UNSPECIFIED TYPE: ICD-10-CM

## 2024-04-01 PROCEDURE — 99214 OFFICE O/P EST MOD 30 MIN: CPT | Performed by: NURSE PRACTITIONER

## 2024-04-01 RX ORDER — DICLOFENAC SODIUM 1 MG/ML
SOLUTION/ DROPS OPHTHALMIC
COMMUNITY
Start: 2024-03-29

## 2024-04-01 RX ORDER — PREDNISOLONE ACETATE 10 MG/ML
SUSPENSION/ DROPS OPHTHALMIC
COMMUNITY
Start: 2024-03-29

## 2024-04-01 RX ORDER — MOXIFLOXACIN 5 MG/ML
SOLUTION/ DROPS OPHTHALMIC
COMMUNITY
Start: 2024-03-29

## 2024-04-01 NOTE — PATIENT INSTRUCTIONS
Preparing for Your Surgery  Getting started  A nurse will call you to review your health history and instructions. They will give you an arrival time based on your scheduled surgery time. Please be ready to share:  Your doctor's clinic name and phone number  Your medical, surgical, and anesthesia history  A list of allergies and sensitivities  A list of medicines, including herbal treatments and over-the-counter drugs  Whether the patient has a legal guardian (ask how to send us the papers in advance)  Please tell us if you're pregnant--or if there's any chance you might be pregnant. Some surgeries may injure a fetus (unborn baby), so they require a pregnancy test. Surgeries that are safe for a fetus don't always need a test, and you can choose whether to have one.   If you have a child who's having surgery, please ask for a copy of Preparing for Your Child's Surgery.    Preparing for surgery  Within 10 to 30 days of surgery: Have a pre-op exam (sometimes called an H&P, or History and Physical). This can be done at a clinic or pre-operative center.  If you're having a , you may not need this exam. Talk to your care team.  At your pre-op exam, talk to your care team about all medicines you take. If you need to stop any medicines before surgery, ask when to start taking them again.  We do this for your safety. Many medicines can make you bleed too much during surgery. Some change how well surgery (anesthesia) drugs work.  Call your insurance company to let them know you're having surgery. (If you don't have insurance, call [phone].)  Call your clinic if there's any change in your health. This includes signs of a cold or flu (sore throat, runny nose, cough, rash, fever). It also includes a scrape or scratch near the surgery site.  If you have questions on the day of surgery, call your hospital or surgery center.  Eating and drinking guidelines  For your safety: Unless your surgeon tells you otherwise,  follow the guidelines below.  Eat and drink as usual until 8 hours before you arrive for surgery. After that, no food or milk.  Drink clear liquids until 2 hours before you arrive. These are liquids you can see through, like water, Gatorade, and Propel Water. They also include plain black coffee and tea (no cream or milk), candy, and breath mints. You can spit out gum when you arrive.  If you drink alcohol: Stop drinking it the night before surgery.  If your care team tells you to take medicine on the morning of surgery, it's okay to take it with a sip of water.  Preventing infection  Shower or bathe the night before and morning of your surgery. Follow the instructions your clinic gave you. (If no instructions, use regular soap.)  Don't shave or clip hair near your surgery site. We'll remove the hair if needed.  Don't smoke or vape the morning of surgery. You may chew nicotine gum up to 2 hours before surgery. A nicotine patch is okay.  Note: Some surgeries require you to completely quit smoking and nicotine. Check with your surgeon.  Your care team will make every effort to keep you safe from infection. We will:  Clean our hands often with soap and water (or an alcohol-based hand rub).  Clean the skin at your surgery site with a special soap that kills germs.  Give you a special gown to keep you warm. (Cold raises the risk of infection.)  Wear special hair covers, masks, gowns and gloves during surgery.  Give antibiotic medicine, if prescribed. Not all surgeries need antibiotics.  What to bring on the day of surgery  Photo ID and insurance card  Copy of your health care directive, if you have one  Glasses and hearing aids (bring cases)  You can't wear contacts during surgery  Inhaler and eye drops, if you use them (tell us about these when you arrive)  CPAP machine or breathing device, if you use them  A few personal items, if spending the night  If you have . . .  A pacemaker, ICD (cardiac defibrillator) or other  implant: Bring the ID card.  An implanted stimulator: Bring the remote control.  A legal guardian: Bring a copy of the certified (court-stamped) guardianship papers.  Please remove any jewelry, including body piercings. Leave jewelry and other valuables at home.  If you're going home the day of surgery  You must have a responsible adult drive you home. They should stay with you overnight as well.  If you don't have someone to stay with you, and you aren't safe to go home alone, we may keep you overnight. Insurance often won't pay for this.  After surgery  If it's hard to control your pain or you need more pain medicine, please call your surgeon's office.  Questions?   If you have any questions for your care team, list them here: _________________________________________________________________________________________________________________________________________________________________________ ____________________________________ ____________________________________ ____________________________________  For informational purposes only. Not to replace the advice of your health care provider. Copyright   2003, 2019 [facility]. All rights reserved. Clinically reviewed by Le Quiñones MD. [employer] 385578 - REV 12/22.    How to Take Your Medication Before Surgery  - Take all of your medications before surgery except as noted below  - HOLD (do not take) your Chlorthalidone on the morning of surgery.

## 2024-04-01 NOTE — PROGRESS NOTES
Preoperative Evaluation  [facility]  [address] [address]  Phone: [phone]  Fax: [phone]  Primary Provider: Derrick Mckeon  Pre-op Performing Provider: PAUL ALVA  Apr 1, 2024       Anuel is a 73 year old, presenting for the following:  Pre-Op Exam (Patient having cataract surgery. Right eye being done on 4/19. Left eye will be on 5/2 through [facility]. )        4/1/2024     9:17 AM   Additional Questions   Roomed by Lyn GERMAIN   Accompanied by Self     Surgical Information  Surgery/Procedure: Cataract surgery.  Surgery Location: [facility]  Surgeon: Dr. Campa  Surgery Date: Right eye 4/19 and left eye on 5/2.  Time of Surgery: unknown  Where patient plans to recover: At home with family  Fax number for surgical facility: [phone]    Assessment & Plan     The proposed surgical procedure is considered LOW risk.    Preop general physical exam  Anuel is considered intermediate risk for low risk procedure.  He denies any new or concerning symptoms. He is cleared for cataract surgery.    Cataract of both eyes, unspecified cataract type  Cleared for surgery    Stage 3b chronic kidney disease (H)  Monitored by PCP, reviewed most recent lab work and kidney function stable.    Hypothyroidism, unspecified type  Stable on levothyroxine.  May continue morning of surgery.    Essential hypertension  Blood pressure on recheck is 136/86, may continue amlodipine and losartan morning of surgery.  Hold chlorthalidone.    Gastroesophageal reflux disease without esophagitis  May continue omeprazole morning of surgery.           Risks and Recommendations  The patient has the following additional risks and recommendations for perioperative complications:  Cardiovascular:   - Chronic hypertension with CKD,Controlled without new or concerning symptoms.    Antiplatelet or Anticoagulation Medication Instructions   - Patient is on no antiplatelet or anticoagulation  medications.    Additional Medication Instructions   - ACE/ARB: Continue without modification (e.g., MAC anesthesia, neurosurgery, spine surgery, heart failure, or labile hypertension with risk of hypertension).   - Diuretics: HOLD on the day of surgery.   - bisphosphonates (alendronate, ibandronate, risedronate): Not taking    Recommendation  APPROVAL GIVEN to proceed with proposed procedure, without further diagnostic evaluation.          Subjective       HPI related to upcoming procedure: right eye blurred vision worse last 6 months, cataracts bilateral        3/30/2024     6:17 PM   Preop Questions   1. Have you ever had a heart attack or stroke? No   2. Have you ever had surgery on your heart or blood vessels, such as a stent placement, a coronary artery bypass, or surgery on an artery in your head, neck, heart, or legs? No   3. Do you have chest pain with activity? No   4. Do you have a history of  heart failure? No   5. Do you currently have a cold, bronchitis or symptoms of other infection? No   6. Do you have a cough, shortness of breath, or wheezing? No   7. Do you or anyone in your family have previous history of blood clots? No   8. Do you or does anyone in your family have a serious bleeding problem such as prolonged bleeding following surgeries or cuts? No   9. Have you ever had problems with anemia or been told to take iron pills? No   10. Have you had any abnormal blood loss such as black, tarry or bloody stools? No   11. Have you ever had a blood transfusion? No   12. Are you willing to have a blood transfusion if it is medically needed before, during, or after your surgery? Yes   13. Have you or any of your relatives ever had problems with anesthesia? No   14. Do you have sleep apnea, excessive snoring or daytime drowsiness? No   15. Do you have any artifical heart valves or other implanted medical devices like a pacemaker, defibrillator, or continuous glucose monitor? No   16. Do you have  artificial joints? No   17. Are you allergic to latex? No       Health Care Directive  Patient has a Health Care Directive on file      Preoperative Review of    reviewed - no record of controlled substances prescribed.          Patient Active Problem List    Diagnosis Date Noted    Hypercalcemia 09/18/2023     Priority: Medium    Bilateral carpal tunnel syndrome 09/18/2023     Priority: Medium    Gastroesophageal reflux disease without esophagitis 02/22/2018     Priority: Medium    Hypothyroidism 02/22/2018     Priority: Medium    Benign prostatic hyperplasia with weak urinary stream 01/09/2017     Priority: Medium    Erectile dysfunction 01/09/2017     Priority: Medium    H/O adenomatous polyp of colon 10/26/2015     Priority: Medium    Health care maintenance 10/21/2015     Priority: Medium    Lumbar disc herniation 12/26/2013     Priority: Medium    Elevated prostate specific antigen (PSA) 03/12/2013     Priority: Medium    Backache 02/29/2012     Priority: Medium    Essential hypertension 09/12/2011     Priority: Medium    Disturbance of skin sensation 03/14/2011     Priority: Medium    Chronic kidney disease, stage III (moderate) (H) 01/11/2011     Priority: Medium      Past Medical History:   Diagnosis Date    Chronic kidney disease, stage III (moderate) (H)     No Comments Provided    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     No Comments Provided    Enlarged prostate without lower urinary tract symptoms (luts)     No Comments Provided    Essential (primary) hypertension     No Comments Provided    Gastro-esophageal reflux disease without esophagitis     No Comments Provided    Hypothyroidism     No Comments Provided    Other specified congenital malformations of intestine     No Comments Provided    PONV (postoperative nausea and vomiting)     with surgeries in the 80's     Past Surgical History:   Procedure Laterality Date    BACK SURGERY      1986 and 1987    COLONOSCOPY       7/29/05,next due 2015    COLONOSCOPY  10/26/2015    Tubular adenoma,F/U 2020    COLONOSCOPY N/A 11/16/2020    F/U 2025 tubular adenomas    CYSTOSCOPY, TRANSURETHRAL RESECTION (TUR) PROSTATE, COMBINED N/A 07/30/2019    Procedure: CYSTOSCOPY, WITH TRANSURETHRAL RESECTION (TUR) PROSTATE;  Surgeon: Art Liu MD;  Location: [facility] OR    OTHER SURGICAL HISTORY      [id number],PARTIAL THYROIDECTOMY    OTHER SURGICAL HISTORY      [id number],ANESTHESIA ALERT,Notes no prior complications from anesthesia.:    ZC TUR PROSTATE BIOPSIES       Current Outpatient Medications   Medication Sig Dispense Refill    acyclovir (ZOVIRAX) 400 MG tablet Take 1 tablet (400 mg) by mouth daily 90 tablet 4    amLODIPine (NORVASC) 10 MG tablet Take 1 tablet (10 mg) by mouth daily 90 tablet 4    chlorthalidone (HYGROTON) 25 MG tablet Take 1 tablet (25 mg) by mouth daily 90 tablet 3    cholecalciferol (VITAMIN D3) 5000 units TABS tablet Take by mouth daily      levothyroxine (SYNTHROID/LEVOTHROID) 150 MCG tablet Take 1 tablet (150 mcg) by mouth daily (with breakfast) 90 tablet 4    losartan (COZAAR) 50 MG tablet Take 1 tablet (50 mg) by mouth daily 90 tablet 4    omeprazole (PRILOSEC) 20 MG DR capsule Take 1 capsule (20 mg) by mouth daily (Patient taking differently: Take 20 mg by mouth every other day) 90 capsule 4    diclofenac (VOLTAREN) 0.1 % ophthalmic solution  (Patient not taking: Reported on 4/1/2024)      moxifloxacin (VIGAMOX) 0.5 % ophthalmic solution  (Patient not taking: Reported on 4/1/2024)      prednisoLONE acetate (PRED FORTE) 1 % ophthalmic suspension  (Patient not taking: Reported on 4/1/2024)         No Known Allergies     Social History     Tobacco Use    Smoking status: Never    Smokeless tobacco: Never   Substance Use Topics    Alcohol use: Yes     Alcohol/week: 0.0 standard drinks of alcohol     Comment: Alcoholic Drinks/day: occasionally       History   Drug Use Unknown     Comment: Drug use: No         Review of  "Systems    Review of Systems  CONSTITUTIONAL: NEGATIVE for fever, chills, change in weight  INTEGUMENTARY/SKIN: NEGATIVE for worrisome rashes, moles or lesions  EYES: POSITIVE for blurred vision right and Hx cataracts and NEGATIVE for discharge bilateral, itching bilateral, and redness bilateral  ENT/MOUTH: NEGATIVE for ear, mouth and throat problems  RESP: NEGATIVE for significant cough or SOB  BREAST: NEGATIVE for masses, tenderness or discharge  CV: NEGATIVE for chest pain, palpitations or peripheral edema  GI: NEGATIVE for nausea, abdominal pain, heartburn, or change in bowel habits  : NEGATIVE for frequency, dysuria, or hematuria  MUSCULOSKELETAL: NEGATIVE for significant arthralgias or myalgia  NEURO: NEGATIVE for weakness, dizziness or paresthesias  ENDOCRINE: NEGATIVE for temperature intolerance, skin/hair changes  HEME: NEGATIVE for bleeding problems  PSYCHIATRIC: NEGATIVE for changes in mood or affect    Objective    /86 (BP Location: Right arm, Patient Position: Sitting, Cuff Size: Adult Large)   Pulse 60   Temp 97.6  F (36.4  C)   Resp 18   Ht 1.943 m (6' 4.5\")   Wt 100.7 kg (222 lb 1.6 oz)   SpO2 99%   BMI 26.68 kg/m     Estimated body mass index is 26.68 kg/m  as calculated from the following:    Height as of this encounter: 1.943 m (6' 4.5\").    Weight as of this encounter: 100.7 kg (222 lb 1.6 oz).  Physical Exam  GENERAL: alert and no distress  EYES: Eyes grossly normal to inspection, PERRL and conjunctivae and sclerae normal  HENT: ear canals and TM's normal, nose and mouth without ulcers or lesions  NECK: no adenopathy, no asymmetry, masses, or scars  RESP: lungs clear to auscultation - no rales, rhonchi or wheezes  CV: regular rate and rhythm, normal S1 S2, no S3 or S4, no murmur, click or rub, no peripheral edema  ABDOMEN: soft, nontender, no hepatosplenomegaly, no masses and bowel sounds normal  MS: no gross musculoskeletal defects noted, no edema  SKIN: no suspicious lesions or " rashes  NEURO: Normal strength and tone, mentation intact and speech normal  PSYCH: mentation appears normal, affect normal/bright    Recent Labs   Lab Test 09/15/23  1243 03/01/23  0911   HGB 12.6* 14.5     --     139   POTASSIUM 4.4 4.1   CR 1.92* 1.81*   A1C 5.5  --         Diagnostics  No labs were ordered during this visit.   No EKG required for low risk surgery (cataract, skin procedure, breast biopsy, etc).    Revised Cardiac Risk Index (RCRI)  The patient has the following serious cardiovascular risks for perioperative complications:   - No serious cardiac risks = 0 points     RCRI Interpretation: 0 points: Class I (very low risk - 0.4% complication rate)         Signed Electronically by: CODI Rodas CNP  Copy of this evaluation report is provided to requesting physician.

## 2024-04-09 SDOH — HEALTH STABILITY: PHYSICAL HEALTH: ON AVERAGE, HOW MANY MINUTES DO YOU ENGAGE IN EXERCISE AT THIS LEVEL?: 50 MIN

## 2024-04-09 SDOH — HEALTH STABILITY: PHYSICAL HEALTH: ON AVERAGE, HOW MANY DAYS PER WEEK DO YOU ENGAGE IN MODERATE TO STRENUOUS EXERCISE (LIKE A BRISK WALK)?: 3 DAYS

## 2024-04-09 ASSESSMENT — SOCIAL DETERMINANTS OF HEALTH (SDOH): HOW OFTEN DO YOU GET TOGETHER WITH FRIENDS OR RELATIVES?: ONCE A WEEK

## 2024-04-16 ENCOUNTER — OFFICE VISIT (OUTPATIENT)
Dept: FAMILY MEDICINE | Facility: CLINIC | Age: 74
End: 2024-04-16
Payer: COMMERCIAL

## 2024-04-16 VITALS
TEMPERATURE: 97.8 F | WEIGHT: 218 LBS | RESPIRATION RATE: 18 BRPM | BODY MASS INDEX: 25.74 KG/M2 | HEART RATE: 66 BPM | SYSTOLIC BLOOD PRESSURE: 136 MMHG | OXYGEN SATURATION: 99 % | DIASTOLIC BLOOD PRESSURE: 80 MMHG | HEIGHT: 77 IN

## 2024-04-16 DIAGNOSIS — R20.9 DISTURBANCE OF SKIN SENSATION: ICD-10-CM

## 2024-04-16 DIAGNOSIS — Z13.220 LIPID SCREENING: ICD-10-CM

## 2024-04-16 DIAGNOSIS — K21.9 GASTROESOPHAGEAL REFLUX DISEASE WITHOUT ESOPHAGITIS: ICD-10-CM

## 2024-04-16 DIAGNOSIS — N40.1 BENIGN PROSTATIC HYPERPLASIA WITH WEAK URINARY STREAM: ICD-10-CM

## 2024-04-16 DIAGNOSIS — E03.9 HYPOTHYROIDISM, UNSPECIFIED TYPE: ICD-10-CM

## 2024-04-16 DIAGNOSIS — E03.8 OTHER SPECIFIED HYPOTHYROIDISM: ICD-10-CM

## 2024-04-16 DIAGNOSIS — R39.12 BENIGN PROSTATIC HYPERPLASIA WITH WEAK URINARY STREAM: ICD-10-CM

## 2024-04-16 DIAGNOSIS — Z00.00 HEALTH CARE MAINTENANCE: ICD-10-CM

## 2024-04-16 DIAGNOSIS — I10 ESSENTIAL HYPERTENSION: ICD-10-CM

## 2024-04-16 DIAGNOSIS — R97.20 ELEVATED PROSTATE SPECIFIC ANTIGEN (PSA): ICD-10-CM

## 2024-04-16 DIAGNOSIS — N18.32 STAGE 3B CHRONIC KIDNEY DISEASE (H): Primary | ICD-10-CM

## 2024-04-16 PROCEDURE — 84443 ASSAY THYROID STIM HORMONE: CPT | Performed by: INTERNAL MEDICINE

## 2024-04-16 PROCEDURE — 82570 ASSAY OF URINE CREATININE: CPT | Performed by: INTERNAL MEDICINE

## 2024-04-16 PROCEDURE — G0439 PPPS, SUBSEQ VISIT: HCPCS | Performed by: INTERNAL MEDICINE

## 2024-04-16 PROCEDURE — 80061 LIPID PANEL: CPT | Performed by: INTERNAL MEDICINE

## 2024-04-16 PROCEDURE — 80048 BASIC METABOLIC PNL TOTAL CA: CPT | Performed by: INTERNAL MEDICINE

## 2024-04-16 PROCEDURE — 82043 UR ALBUMIN QUANTITATIVE: CPT | Performed by: INTERNAL MEDICINE

## 2024-04-16 PROCEDURE — 99214 OFFICE O/P EST MOD 30 MIN: CPT | Mod: 25 | Performed by: INTERNAL MEDICINE

## 2024-04-16 PROCEDURE — 36415 COLL VENOUS BLD VENIPUNCTURE: CPT | Performed by: INTERNAL MEDICINE

## 2024-04-16 RX ORDER — AMLODIPINE BESYLATE 10 MG/1
10 TABLET ORAL DAILY
Qty: 90 TABLET | Refills: 3 | Status: SHIPPED | OUTPATIENT
Start: 2024-04-16 | End: 2024-04-25

## 2024-04-16 RX ORDER — ACYCLOVIR 400 MG/1
400 TABLET ORAL DAILY
Qty: 90 TABLET | Refills: 3 | Status: SHIPPED | OUTPATIENT
Start: 2024-04-16 | End: 2024-04-25

## 2024-04-16 RX ORDER — CHLORTHALIDONE 25 MG/1
25 TABLET ORAL DAILY
Qty: 90 TABLET | Refills: 3 | Status: SHIPPED | OUTPATIENT
Start: 2024-04-16 | End: 2024-04-25

## 2024-04-16 RX ORDER — LOSARTAN POTASSIUM 50 MG/1
50 TABLET ORAL DAILY
Qty: 90 TABLET | Refills: 3 | Status: SHIPPED | OUTPATIENT
Start: 2024-04-16 | End: 2024-04-25

## 2024-04-16 RX ORDER — LEVOTHYROXINE SODIUM 150 UG/1
150 TABLET ORAL
Qty: 90 TABLET | Refills: 3 | Status: SHIPPED | OUTPATIENT
Start: 2024-04-16 | End: 2024-04-25

## 2024-04-16 NOTE — ASSESSMENT & PLAN NOTE
Baseline Scr 1.7-1.8; eGFR 35-40mL/min  - historically with mild albuminuria  -Consideration for future SGLT2i introduction if albuminuria persists

## 2024-04-16 NOTE — ASSESSMENT & PLAN NOTE
CRC: Prior colonoscopy in 2020 -recommendations for 5-year follow-up  Prior PSA testing -historically had been high; previous prostate biopsy without malignancy   -Advise no further PSA screening  Vaccinations discussed and updated accordingly  Baseline tinnitus without any significant observed hearing loss

## 2024-04-16 NOTE — PROGRESS NOTES
Preventive Care Visit  [facility]  Derrick Mckeon MD, Internal Medicine  Apr 16, 2024      Assessment & Plan   Problem List Items Addressed This Visit          Nervous and Auditory    Disturbance of skin sensation    Relevant Medications    acyclovir (ZOVIRAX) 400 MG tablet       Digestive    Gastroesophageal reflux disease without esophagitis     Advise increasing omeprazole to daily use  -Counseled on avoidance of stomach distention -encouraging smaller more frequent meals         Relevant Medications    omeprazole (PRILOSEC) 20 MG DR capsule       Endocrine    Other specified hypothyroidism    Relevant Medications    levothyroxine (SYNTHROID/LEVOTHROID) 150 MCG tablet       Circulatory    Essential hypertension     current antihypertensive regimen: chlorthalidone 25mg daily, amlodipine 10mg daily, losartan 50mg daily  regimen changes: none  intolerance:   future titration/work-up plan:  - advised to change to evening dosing (symptomatic hypotension during day)  -If persistent perceived postural hypotension despite change in timing of meds, would think about de-escalation in the dose of multiple meds         Relevant Medications    amLODIPine (NORVASC) 10 MG tablet    chlorthalidone (HYGROTON) 25 MG tablet    losartan (COZAAR) 50 MG tablet       Urinary    Benign prostatic hyperplasia with weak urinary stream     Remote history of TURP; '19  - good resolution of symptoms         Chronic kidney disease, stage III (moderate) (H) - Primary     Baseline Scr 1.7-1.8; eGFR 35-40mL/min  - historically with mild albuminuria  -Consideration for future SGLT2i introduction if albuminuria persists         Relevant Orders    Albumin Random Urine Quantitative with Creat Ratio    Basic metabolic panel       Other    Elevated prostate specific antigen (PSA)     Prior urological eval including prostate biopsy - hyperplasia; no cancer  - advising no further PSA screening given age >75         Health care  "maintenance     CRC: Prior colonoscopy in 2020 -recommendations for 5-year follow-up  Prior PSA testing -historically had been high; previous prostate biopsy without malignancy   -Advise no further PSA screening  Vaccinations discussed and updated accordingly  Baseline tinnitus without any significant observed hearing loss          Other Visit Diagnoses       Lipid screening        Relevant Orders    Lipid panel reflex to direct LDL Non-fasting    Hypothyroidism, unspecified type        Relevant Medications    levothyroxine (SYNTHROID/LEVOTHROID) 150 MCG tablet    Other Relevant Orders    TSH WITH FREE T4 REFLEX              BMI  Estimated body mass index is 26.19 kg/m  as calculated from the following:    Height as of this encounter: 1.943 m (6' 4.5\").    Weight as of this encounter: 98.9 kg (218 lb).       Counseling  Appropriate preventive services were discussed with this patient, including applicable screening as appropriate for fall prevention, nutrition, physical activity, Tobacco-use cessation, weight loss and cognition.  Checklist reviewing preventive services available has been given to the patient.  Reviewed patient's diet, addressing concerns and/or questions.   He is at risk for lack of exercise and has been provided with information to increase physical activity for the benefit of his well-being.   Information on urinary incontinence and treatment options given to patient.     FUTURE APPOINTMENTS:       - Follow-up visit in 12 months      Subjective   Anuel is a 73 year old, presenting for the following: Returns for Medicare evaluation and follow-up related to his CKD and hypertension.  Generally doing well.  Does have concerns about potential symptomatic hypotension.  Home measurements generally with systolic pressures in the 130s; however does describe fairly regular postural orthostatic symptoms occurring several times per week.  No syncopal events.  Takes all his medications in the morning.  " Describes worsening reflux symptoms of late; takes omeprazole on every other day basis.  Planned cataract procedures in near future as already completed preop evaluation  Medicare Visit        4/16/2024     8:12 AM   Additional Questions   Roomed by Ofelia NEUMANN         Health Care Directive  Patient has a Health Care Directive on file  Discussed advance care planning with patient.    HPI            4/9/2024   General Health   How would you rate your overall physical health? Good   Feel stress (tense, anxious, or unable to sleep) To some extent   (!) STRESS CONCERN      4/9/2024   Nutrition   Diet: Regular (no restrictions)         4/9/2024   Exercise   Days per week of moderate/strenous exercise 3 days   Average minutes spent exercising at this level 50 min         4/9/2024   Social Factors   Frequency of gathering with friends or relatives Once a week   Worry food won't last until get money to buy more No   Food not last or not have enough money for food? No   Do you have housing?  Yes   Are you worried about losing your housing? No   Lack of transportation? No   Unable to get utilities (heat,electricity)? No         4/15/2024   Fall Risk   Fallen 2 or more times in the past year? No   Trouble with walking or balance? No          4/9/2024   Activities of Daily Living- Home Safety   Needs help with the following daily activites None of the above   Safety concerns in the home None of the above         4/9/2024   Dental   Dentist two times every year? Yes         4/9/2024   Hearing Screening   Hearing concerns? None of the above         4/9/2024   Driving Risk Screening   Patient/family members have concerns about driving No         4/9/2024   General Alertness/Fatigue Screening   Have you been more tired than usual lately? No         4/9/2024   Urinary Incontinence Screening   Bothered by leaking urine in past 6 months Yes         4/9/2024   TB Screening   Were you born outside of the [place]? No         Today's PHQ-2  Score:       4/15/2024     6:09 PM   PHQ-2 ( 1999 [employer])   Q1: Little interest or pleasure in doing things 0   Q2: Feeling down, depressed or hopeless 0   PHQ-2 Score 0   Q1: Little interest or pleasure in doing things Not at all   Q2: Feeling down, depressed or hopeless Not at all   PHQ-2 Score 0           4/9/2024   Substance Use   Alcohol more than 3/day or more than 7/wk No   Do you have a current opioid prescription? No   How severe/bad is pain from 1 to 10? 1/10   Do you use any other substances recreationally? No     Social History     Tobacco Use    Smoking status: Never     Passive exposure: Never    Smokeless tobacco: Never   Vaping Use    Vaping status: Never Used   Substance Use Topics    Alcohol use: Yes     Comment: Alcoholic Drinks/day: occasionally    Drug use: Never     Comment: Drug use: No           4/9/2024   AAA Screening   Family history of Abdominal Aortic Aneurysm (AAA)? No   ASCVD Risk   The 10-year ASCVD risk score (Jordan MCNEILL, et al., 2019) is: 28.1%    Values used to calculate the score:      Age: 73 years      Sex: Male      Is Non- : No      Diabetic: No      Tobacco smoker: No      Systolic Blood Pressure: 136 mmHg      Is BP treated: Yes      HDL Cholesterol: 62 mg/dL      Total Cholesterol: 175 mg/dL          Reviewed and updated as needed this visit by Provider     Meds  Problems               Current providers sharing in care for this patient include:  Patient Care Team:  Derrick Mckeon MD as PCP - General  Fady Penaloza MD as Assigned PCP  Art Liu MD as Assigned Surgical Provider    The following health maintenance items are reviewed in Epic and correct as of today:  Health Maintenance   Topic Date Due    RSV VACCINE (Pregnancy & 60+) (1 - 1-dose 60+ series) Never done    MEDICARE ANNUAL WELLNESS VISIT  03/01/2024    LIPID  03/01/2024    MICROALBUMIN  03/01/2024    TSH W/FREE T4 REFLEX  03/01/2024    BMP  09/15/2024    ANNUAL REVIEW OF  "HM ORDERS  09/15/2024    HEMOGLOBIN  09/15/2024    FALL RISK ASSESSMENT  04/16/2025    COLORECTAL CANCER SCREENING  11/16/2025    GLUCOSE  09/15/2026    ADVANCE CARE PLANNING  04/16/2029    DTAP/TDAP/TD IMMUNIZATION (4 - Td or Tdap) 02/04/2032    HEPATITIS C SCREENING  Completed    PHQ-2 (once per calendar year)  Completed    INFLUENZA VACCINE  Completed    Pneumococcal Vaccine: 65+ Years  Completed    URINALYSIS  Completed    ZOSTER IMMUNIZATION  Completed    COVID-19 Vaccine  Completed    IPV IMMUNIZATION  Aged Out    HPV IMMUNIZATION  Aged Out    MENINGITIS IMMUNIZATION  Aged Out    RSV MONOCLONAL ANTIBODY  Aged Out         Review of Systems  Constitutional, HEENT, cardiovascular, pulmonary, gi and gu systems are negative, except as otherwise noted.     Objective    Exam  /80   Pulse 66   Temp 97.8  F (36.6  C)   Resp 18   Ht 1.943 m (6' 4.5\")   Wt 98.9 kg (218 lb)   SpO2 99%   BMI 26.19 kg/m     Estimated body mass index is 26.19 kg/m  as calculated from the following:    Height as of this encounter: 1.943 m (6' 4.5\").    Weight as of this encounter: 98.9 kg (218 lb).    Physical Exam  GENERAL: alert and no distress  NECK: no adenopathy, no asymmetry, masses, or scars  RESP: lungs clear to auscultation - no rales, rhonchi or wheezes  CV: regular rate and rhythm, normal S1 S2, no S3 or S4, no murmur, click or rub, no peripheral edema  ABDOMEN: soft, nontender, no hepatosplenomegaly, no masses and bowel sounds normal  MS: no gross musculoskeletal defects noted, no edema        4/16/2024   Mini Cog   Mini-Cog Not Completed (choose reason) Patient declines         Hearing Screen      -- -- -- --   Comments (C&TC Required): -- -- -- --   RIGHT EAR     1000 Hz on Level 40 dB (Conditioning sound) -- -- -- Pass   1000 Hz on Level 20 dB -- -- -- Pass   2000 Hz on Level 20 dB -- -- -- Pass   4000 Hz on Level 20 dB -- -- -- Pass   LEFT EAR     4000 Hz on Level 20 dB -- -- -- Pass   2000 Hz on Level 20 dB -- -- " -- Pass   1000 Hz on Level 20 dB -- -- -- Pass   500 Hz on Level 25 dB -- -- -- Pass   RIGHT EAR     500 Hz on Level 25 dB -- -- -- Pass   Results     Hearing Screen Results Pass -- -- -- Pass    -- -- -- --          Signed Electronically by: Derrick Mckeon MD

## 2024-04-16 NOTE — ASSESSMENT & PLAN NOTE
Prior urological eval including prostate biopsy - hyperplasia; no cancer  - advising no further PSA screening given age >75

## 2024-04-16 NOTE — ASSESSMENT & PLAN NOTE
current antihypertensive regimen: chlorthalidone 25mg daily, amlodipine 10mg daily, losartan 50mg daily  regimen changes: none  intolerance:   future titration/work-up plan:  - advised to change to evening dosing (symptomatic hypotension during day)  -If persistent perceived postural hypotension despite change in timing of meds, would think about de-escalation in the dose of multiple meds

## 2024-04-16 NOTE — ASSESSMENT & PLAN NOTE
Advise increasing omeprazole to daily use  -Counseled on avoidance of stomach distention -encouraging smaller more frequent meals

## 2024-04-17 LAB
ANION GAP SERPL CALCULATED.3IONS-SCNC: 10 MMOL/L (ref 7–15)
BUN SERPL-MCNC: 29 MG/DL (ref 8–23)
CALCIUM SERPL-MCNC: 11.1 MG/DL (ref 8.8–10.2)
CHLORIDE SERPL-SCNC: 104 MMOL/L (ref 98–107)
CHOLEST SERPL-MCNC: 177 MG/DL
CREAT SERPL-MCNC: 1.89 MG/DL (ref 0.67–1.17)
CREAT UR-MCNC: 143.2 MG/DL
DEPRECATED HCO3 PLAS-SCNC: 26 MMOL/L (ref 22–29)
EGFRCR SERPLBLD CKD-EPI 2021: 37 ML/MIN/1.73M2
FASTING STATUS PATIENT QL REPORTED: YES
GLUCOSE SERPL-MCNC: 104 MG/DL (ref 70–99)
HDLC SERPL-MCNC: 56 MG/DL
LDLC SERPL CALC-MCNC: 110 MG/DL
MICROALBUMIN UR-MCNC: 87.2 MG/L
MICROALBUMIN/CREAT UR: 60.89 MG/G CR (ref 0–17)
NONHDLC SERPL-MCNC: 121 MG/DL
POTASSIUM SERPL-SCNC: 4.3 MMOL/L (ref 3.4–5.3)
SODIUM SERPL-SCNC: 140 MMOL/L (ref 135–145)
TRIGL SERPL-MCNC: 53 MG/DL
TSH SERPL DL<=0.005 MIU/L-ACNC: 1.36 UIU/ML (ref 0.3–4.2)

## 2024-04-25 ENCOUNTER — TELEPHONE (OUTPATIENT)
Dept: FAMILY MEDICINE | Facility: CLINIC | Age: 74
End: 2024-04-25
Payer: COMMERCIAL

## 2024-04-25 DIAGNOSIS — R20.9 DISTURBANCE OF SKIN SENSATION: ICD-10-CM

## 2024-04-25 DIAGNOSIS — E03.9 HYPOTHYROIDISM, UNSPECIFIED TYPE: ICD-10-CM

## 2024-04-25 DIAGNOSIS — K21.9 GASTROESOPHAGEAL REFLUX DISEASE WITHOUT ESOPHAGITIS: ICD-10-CM

## 2024-04-25 DIAGNOSIS — I10 ESSENTIAL HYPERTENSION: ICD-10-CM

## 2024-04-25 RX ORDER — AMLODIPINE BESYLATE 10 MG/1
10 TABLET ORAL DAILY
Qty: 90 TABLET | Refills: 3 | Status: SHIPPED | OUTPATIENT
Start: 2024-04-25

## 2024-04-25 RX ORDER — CHLORTHALIDONE 25 MG/1
25 TABLET ORAL DAILY
Qty: 90 TABLET | Refills: 3 | Status: SHIPPED | OUTPATIENT
Start: 2024-04-25

## 2024-04-25 RX ORDER — LOSARTAN POTASSIUM 50 MG/1
50 TABLET ORAL DAILY
Qty: 90 TABLET | Refills: 3 | Status: SHIPPED | OUTPATIENT
Start: 2024-04-25

## 2024-04-25 RX ORDER — ACYCLOVIR 400 MG/1
400 TABLET ORAL DAILY
Qty: 90 TABLET | Refills: 3 | Status: SHIPPED | OUTPATIENT
Start: 2024-04-25

## 2024-04-25 RX ORDER — LEVOTHYROXINE SODIUM 150 UG/1
150 TABLET ORAL
Qty: 90 TABLET | Refills: 3 | Status: SHIPPED | OUTPATIENT
Start: 2024-04-25

## 2024-04-25 NOTE — TELEPHONE ENCOUNTER
PATIENT is calling to have RX re-sent. [facility] shared they did not receive RX.    E-Prescribing Status: Receipt confirmed by pharmacy (4/16/2024  8:30 AM CDT)

## 2025-02-05 ENCOUNTER — OFFICE VISIT (OUTPATIENT)
Dept: FAMILY MEDICINE | Facility: CLINIC | Age: 75
End: 2025-02-05
Payer: COMMERCIAL

## 2025-02-05 VITALS
HEART RATE: 78 BPM | DIASTOLIC BLOOD PRESSURE: 84 MMHG | BODY MASS INDEX: 25.74 KG/M2 | WEIGHT: 218 LBS | HEIGHT: 77 IN | TEMPERATURE: 98.4 F | OXYGEN SATURATION: 98 % | RESPIRATION RATE: 16 BRPM | SYSTOLIC BLOOD PRESSURE: 130 MMHG

## 2025-02-05 DIAGNOSIS — Z00.00 HEALTH CARE MAINTENANCE: ICD-10-CM

## 2025-02-05 DIAGNOSIS — E03.9 HYPOTHYROIDISM, UNSPECIFIED TYPE: ICD-10-CM

## 2025-02-05 DIAGNOSIS — R10.13 DYSPEPSIA: ICD-10-CM

## 2025-02-05 DIAGNOSIS — R20.9 DISTURBANCE OF SKIN SENSATION: ICD-10-CM

## 2025-02-05 DIAGNOSIS — I10 ESSENTIAL HYPERTENSION: ICD-10-CM

## 2025-02-05 DIAGNOSIS — E83.52 HYPERCALCEMIA: ICD-10-CM

## 2025-02-05 DIAGNOSIS — K21.9 GASTROESOPHAGEAL REFLUX DISEASE WITHOUT ESOPHAGITIS: ICD-10-CM

## 2025-02-05 DIAGNOSIS — N18.30 STAGE 3 CHRONIC KIDNEY DISEASE, UNSPECIFIED WHETHER STAGE 3A OR 3B CKD (H): Primary | ICD-10-CM

## 2025-02-05 LAB
ALBUMIN SERPL BCG-MCNC: 3.9 G/DL (ref 3.5–5.2)
ALP SERPL-CCNC: 567 U/L (ref 40–150)
ALT SERPL W P-5'-P-CCNC: 111 U/L (ref 0–70)
ANION GAP SERPL CALCULATED.3IONS-SCNC: 12 MMOL/L (ref 7–15)
AST SERPL W P-5'-P-CCNC: 90 U/L (ref 0–45)
BILIRUB DIRECT SERPL-MCNC: <0.2 MG/DL (ref 0–0.3)
BILIRUB SERPL-MCNC: 0.7 MG/DL
BUN SERPL-MCNC: 34 MG/DL (ref 8–23)
CALCIUM SERPL-MCNC: 11.8 MG/DL (ref 8.8–10.4)
CHLORIDE SERPL-SCNC: 106 MMOL/L (ref 98–107)
CHOLEST SERPL-MCNC: 143 MG/DL
CREAT SERPL-MCNC: 1.86 MG/DL (ref 0.67–1.17)
EGFRCR SERPLBLD CKD-EPI 2021: 37 ML/MIN/1.73M2
ERYTHROCYTE [DISTWIDTH] IN BLOOD BY AUTOMATED COUNT: 13.1 % (ref 10–15)
FASTING STATUS PATIENT QL REPORTED: NO
FASTING STATUS PATIENT QL REPORTED: NO
GLUCOSE SERPL-MCNC: 102 MG/DL (ref 70–99)
HCO3 SERPL-SCNC: 23 MMOL/L (ref 22–29)
HCT VFR BLD AUTO: 37 % (ref 40–53)
HDLC SERPL-MCNC: 44 MG/DL
HGB BLD-MCNC: 12.3 G/DL (ref 13.3–17.7)
LDLC SERPL CALC-MCNC: 84 MG/DL
MCH RBC QN AUTO: 29.2 PG (ref 26.5–33)
MCHC RBC AUTO-ENTMCNC: 33.2 G/DL (ref 31.5–36.5)
MCV RBC AUTO: 88 FL (ref 78–100)
NONHDLC SERPL-MCNC: 99 MG/DL
PLATELET # BLD AUTO: 297 10E3/UL (ref 150–450)
POTASSIUM SERPL-SCNC: 4.5 MMOL/L (ref 3.4–5.3)
PROT SERPL-MCNC: 7.5 G/DL (ref 6.4–8.3)
PTH-INTACT SERPL-MCNC: 57 PG/ML (ref 15–65)
RBC # BLD AUTO: 4.21 10E6/UL (ref 4.4–5.9)
SODIUM SERPL-SCNC: 141 MMOL/L (ref 135–145)
TRIGL SERPL-MCNC: 76 MG/DL
TSH SERPL DL<=0.005 MIU/L-ACNC: 1.94 UIU/ML (ref 0.3–4.2)
WBC # BLD AUTO: 9.2 10E3/UL (ref 4–11)

## 2025-02-05 RX ORDER — LEVOTHYROXINE SODIUM 150 UG/1
150 TABLET ORAL
Qty: 90 TABLET | Refills: 3 | Status: SHIPPED | OUTPATIENT
Start: 2025-02-05

## 2025-02-05 RX ORDER — ACYCLOVIR 400 MG/1
400 TABLET ORAL DAILY
Qty: 90 TABLET | Refills: 3 | Status: SHIPPED | OUTPATIENT
Start: 2025-02-05

## 2025-02-05 RX ORDER — AMLODIPINE BESYLATE 10 MG/1
10 TABLET ORAL DAILY
Qty: 90 TABLET | Refills: 3 | Status: SHIPPED | OUTPATIENT
Start: 2025-02-05

## 2025-02-05 RX ORDER — OMEPRAZOLE 20 MG/1
20 CAPSULE, DELAYED RELEASE ORAL DAILY
Qty: 90 CAPSULE | Refills: 3 | Status: SHIPPED | OUTPATIENT
Start: 2025-02-05 | End: 2025-02-05

## 2025-02-05 RX ORDER — OMEPRAZOLE 20 MG/1
20 CAPSULE, DELAYED RELEASE ORAL 2 TIMES DAILY
Qty: 180 CAPSULE | Refills: 3 | Status: SHIPPED | OUTPATIENT
Start: 2025-02-05

## 2025-02-05 RX ORDER — LOSARTAN POTASSIUM 50 MG/1
50 TABLET ORAL DAILY
Qty: 90 TABLET | Refills: 3 | Status: SHIPPED | OUTPATIENT
Start: 2025-02-05

## 2025-02-05 RX ORDER — CHLORTHALIDONE 25 MG/1
25 TABLET ORAL DAILY
Qty: 90 TABLET | Refills: 3 | Status: SHIPPED | OUTPATIENT
Start: 2025-02-05

## 2025-02-05 ASSESSMENT — ENCOUNTER SYMPTOMS: NAUSEA: 1

## 2025-02-05 NOTE — PROGRESS NOTES
Assessment & Plan   Problem List Items Addressed This Visit          Nervous and Auditory    Disturbance of skin sensation    Relevant Medications    acyclovir (ZOVIRAX) 400 MG tablet       Digestive    Gastroesophageal reflux disease without esophagitis    Relevant Medications    omeprazole (PRILOSEC) 20 MG DR capsule    Dyspepsia     2/2025: Approximately 5-month history of vague epigastric discomfort, associated nausea  -Consideration for peptic ulcer disease/gastritis.  Lower suspicions for biliary colic/GB disease based on symptom description  -Advised to increase omeprazole to twice daily; specifically counseled to take prior to meals  -Obtaining CBC, LFTs, BMP  -Obtaining CT abdomen pelvis for further assessment  -Referral to gastroenterology         Relevant Orders    CBC with platelets (Completed)    Basic metabolic panel    Hepatic panel (Albumin, ALT, AST, Bili, Alk Phos, TP)    Lipid panel reflex to direct LDL Fasting    TSH with free T4 reflex    Parathyroid Hormone Intact    CT Abdomen Pelvis w/o Contrast    Adult GI  Referral - Consult Only       Endocrine    Hypercalcemia    Relevant Orders    Parathyroid Hormone Intact    CT Abdomen Pelvis w/o Contrast       Circulatory    Essential hypertension    Relevant Medications    amLODIPine (NORVASC) 10 MG tablet    chlorthalidone (HYGROTON) 25 MG tablet    losartan (COZAAR) 50 MG tablet       Urinary    Stage 3b chronic kidney disease (H) - Primary     Baseline Scr 1.7-1.8; eGFR 35-40mL/min  - historically with mild albuminuria  -Consideration for future SGLT2i introduction if albuminuria persists             Other    Health care maintenance    Relevant Orders    REVIEW OF HEALTH MAINTENANCE PROTOCOL ORDERS (Completed)     Other Visit Diagnoses       Hypothyroidism, unspecified type        Relevant Medications    levothyroxine (SYNTHROID/LEVOTHROID) 150 MCG tablet    Other Relevant Orders    TSH with free T4 reflex                 "  BMI  Estimated body mass index is 26.19 kg/m  as calculated from the following:    Height as of this encounter: 1.943 m (6' 4.5\").    Weight as of this encounter: 98.9 kg (218 lb).             Subjective   Anuel is a 74 year old, presenting for the following health issues: Presents with approximately 5-month history of persistent epigastric discomfort, generalized nausea, food avoidance.  Describes a general vague discomfort in epigastric region/right upper quadrant -aggravated with eating.  Frequently associated with some mild nausea feelings.  No emesis.  Weight stable.  No lower GI symptoms; specifically no diarrhea.  Using omeprazole 20 mg daily, uses at bedtime (specifically not using prior to any meals).  Feels like more regular omeprazole use has helped with prior heartburn complaints -no real changes with his epigastric discomfort.  Very occasional NSAID use; maybe once a week.  Nausea        2/5/2025     9:08 AM   Additional Questions   Roomed by Ofelia NEUMANN     Nausea  Associated symptoms include nausea.   History of Present Illness       Reason for visit:  Stomach issues  Symptom onset:  More than a month  Symptoms include:  Stomach discomfort  Symptom intensity:  Moderate  Symptom progression:  Staying the same  Had these symptoms before:  No  What makes it worse:  No  What makes it better:  No        Review of Systems  Constitutional, HEENT, cardiovascular, pulmonary, gi and gu systems are negative, except as otherwise noted.      Objective    BP (!) 144/89   Pulse 78   Temp 98.4  F (36.9  C)   Resp 16   Ht 1.943 m (6' 4.5\")   Wt 98.9 kg (218 lb)   SpO2 98%   BMI 26.19 kg/m    Body mass index is 26.19 kg/m .  Physical Exam   GENERAL: alert and no distress  NECK: no adenopathy, no asymmetry, masses, or scars  RESP: lungs clear to auscultation - no rales, rhonchi or wheezes  CV: regular rate and rhythm, normal S1 S2, no S3 or S4, no murmur, click or rub, no peripheral edema  ABDOMEN: soft, " nontender, no hepatosplenomegaly, no masses and bowel sounds normal  MS: no gross musculoskeletal defects noted, no edema            Signed Electronically by: Derrick Mckeon MD

## 2025-02-05 NOTE — ASSESSMENT & PLAN NOTE
2/2025: Approximately 5-month history of vague epigastric discomfort, associated nausea  -Consideration for peptic ulcer disease/gastritis.  Lower suspicions for biliary colic/GB disease based on symptom description  -Advised to increase omeprazole to twice daily; specifically counseled to take prior to meals  -Obtaining CBC, LFTs, BMP  -Obtaining CT abdomen pelvis for further assessment  -Referral to gastroenterology

## 2025-02-06 ENCOUNTER — TELEPHONE (OUTPATIENT)
Dept: GASTROENTEROLOGY | Facility: CLINIC | Age: 75
End: 2025-02-06
Payer: COMMERCIAL

## 2025-02-06 NOTE — TELEPHONE ENCOUNTER
[facility] Call Center    Phone Message    May a detailed message be left on voicemail: Yes    Reason for Call: Other: Patient is currently scheduled on 3/11, as visit type New GI Urgent. This is outside the expected timeline for this referral. Patient has been added to the waitlist.      Action Taken: Message routed to:  Other: GI REFERRAL TRIAGE POOL     Travel Screening: Not Applicable

## 2025-02-06 NOTE — TELEPHONE ENCOUNTER
REFERRAL INFORMATION:  Referring Provider:  Derrick Mckeon MD   Referring Clinic:  [facility] FP/IM/PEDS   Reason for Visit/Diagnosis: R10.13 (ICD-10-CM) - Dyspepsia      FUTURE VISIT INFORMATION:  Appointment Date: 3/11/25     NOTES STATUS DETAILS   OFFICE NOTE from Referring Provider Internal 2/5/25   MEDICATION LIST Internal    PROCEDURES     COLONOSCOPY Internal 11/16/20   STOOL TESTING     LABS     PERTINENT LABS Internal    PATHOLOGY REPORTS (RELATED) Internal Colonoscopy 11/16/20   IMAGES

## 2025-02-11 ENCOUNTER — TRANSFERRED RECORDS (OUTPATIENT)
Dept: HEALTH INFORMATION MANAGEMENT | Facility: CLINIC | Age: 75
End: 2025-02-11
Payer: COMMERCIAL

## 2025-02-14 ENCOUNTER — HOSPITAL ENCOUNTER (OUTPATIENT)
Dept: CT IMAGING | Facility: CLINIC | Age: 75
Discharge: HOME | End: 2025-02-14
Attending: INTERNAL MEDICINE | Admitting: INTERNAL MEDICINE
Payer: COMMERCIAL

## 2025-02-14 DIAGNOSIS — E83.52 HYPERCALCEMIA: ICD-10-CM

## 2025-02-14 DIAGNOSIS — R10.13 DYSPEPSIA: ICD-10-CM

## 2025-02-14 PROCEDURE — 250N000011 HC RX IP 250 OP 636: Performed by: INTERNAL MEDICINE

## 2025-02-14 PROCEDURE — 74177 CT ABD & PELVIS W/CONTRAST: CPT

## 2025-02-14 RX ORDER — IOPAMIDOL 755 MG/ML
90 INJECTION, SOLUTION INTRAVASCULAR ONCE
Status: COMPLETED | OUTPATIENT
Start: 2025-02-14 | End: 2025-02-14

## 2025-02-14 RX ADMIN — IOPAMIDOL 90 ML: 755 INJECTION, SOLUTION INTRAVENOUS at 13:38

## 2025-02-17 ENCOUNTER — TELEPHONE (OUTPATIENT)
Dept: FAMILY MEDICINE | Facility: CLINIC | Age: 75
End: 2025-02-17

## 2025-02-17 NOTE — TELEPHONE ENCOUNTER
[facility] calling to report significant finding on CT Scan.  Radiology will be releasing results.    Please review and advise.

## 2025-02-19 ENCOUNTER — PATIENT OUTREACH (OUTPATIENT)
Dept: ONCOLOGY | Facility: CLINIC | Age: 75
End: 2025-02-19
Payer: COMMERCIAL

## 2025-02-19 NOTE — PROGRESS NOTES
New Patient Oncology Nurse Navigator Note     Referring provider: Dr Mckeon, PCP    Referring Clinic/Organization: [facility]     Referred to: Medical Oncology    Requested provider (if applicable): First available - did not specify     Referral Received: 02/18/25       Evaluation for : CT concerning for liver malignancy     Clinical History (per Nurse review of records provided):        9/29/2022 MR Prostate   Lesion 1:  Location: Right mid anterior transitional zone.  Description: There is a 1.4 x 1.0 x 1.0 cm area of poorly defined  diminished T2 signal with increased dynamic contrast enhancement and  slight ADC signal change.  PI-RADS: 3     Pelvis: No pelvic adenopathy is identified. No suspicious osseous  lesion is seen.                                                                      IMPRESSION:  Single intermediate suspicion right transitional zone lesion.     Assessment: PI-RADS 3      10/17/2022 CT Urogram   Impression:     No solid masses in the kidneys. No filling defects in the collecting  systems, ureters, or bladder.     There are 2 intermediate density cysts in the right kidney, without  measurable enhancement, compatible with Bosniak 2 benign cysts.     There is a 2.5 cm slightly hypoattenuating lesion in the right lobe of  the liver with enhancement characteristics most compatible with a  benign hemangioma however it is slightly atypical in appearance. A  multi phase MRI could be considered for further evaluation.      10/17/2022 Pt met w/ Urology Dr Liu:  Assessment  Mr. Samaniego is a 72 year old male follows up to undergo cystoscopy and review of CT urogram to complete hematuria work-up as well as review a prostate MRI which was obtained due to rising PSA.     CT urogram was reviewed and negative.  Cystoscopy was negative.     We reviewed his prostate MRI which was notable for a low-grade lesion, PI-RADS 3.  Of note the patient's prostate was measured at 182 g.  His PSA density is  extremely favorable at around 0.05     Plan  Annual PSA                2/14/2025 CT abd/pelvis ([facility])  IMPRESSION:   1.  Patient has findings suspicious for hepatocellular carcinoma.   2.  There is a large mass replacing the right lobe of the liver with capsular contraction, satellite lesions, intrahepatic biliary dilatation, presumed tumor thrombus in the superior branch of the right portal vein and extensive upper abdominal and   pericardiophrenic adenopathy.  3.  New mild splenomegaly.  4.  Cholelithiasis.  5.  Prostate is quite enlarged.  6.  Findings discussed by the undersigned with Dr. Jose Montes  at time of  dictation evening of 2/14/25 and with nurse Tyra who works with Dr. Mckeon at 1305 on 2/17/25.        Clinical Assessment / Barriers to Care (Per Nurse):    PCP is referring pt to [facility] Onc for further evaluation and work up of recent CT abd/pelvis findings, concerning for malignancy.     Will proceed to offer next available Onc per pt preference (lives in [place], [place]).      I left  for pt to call back and discuss his questions.       Records Location: [facility] Everywhere     Records Needed:     N/A    Additional testing needed prior to consult:     TBD    Addendum 2/20/2025: Pt now scheduled for IR liver biopsy 2/26/25 at [facility]; will encourage him to take an Onc appt 5-7 business days after bx so final path is ready for Oncologist.       Rodríguez Davenport, RN, BSN, OCN  Oncology New Patient Nurse Navigator   [facility]  [phone]

## 2025-02-20 NOTE — TELEPHONE ENCOUNTER
RECORDS STATUS - ALL OTHER DIAGNOSIS      RECORDS RECEIVED FROM: [facility]   NOTES STATUS DETAILS   OFFICE NOTE from referring provider Epic 2/5/2025 - Dr. Derrick Mckeon   MEDICATION LIST [facility]    LABS     PATHOLOGY REPORTS     ANYTHING RELATED TO DIAGNOSIS Epic 2/5/2025   IMAGING (NEED IMAGES & REPORT)     CT SCANS PACS CT Abdomen Pelvis: 2/14/2025   ULTRASOUND PACS US Liver: 2/26/2025

## 2025-02-21 ENCOUNTER — MYC MEDICAL ADVICE (OUTPATIENT)
Dept: INTERVENTIONAL RADIOLOGY/VASCULAR | Facility: CLINIC | Age: 75
End: 2025-02-21
Payer: COMMERCIAL

## 2025-02-26 ENCOUNTER — HOSPITAL ENCOUNTER (OUTPATIENT)
Dept: ULTRASOUND IMAGING | Facility: CLINIC | Age: 75
Discharge: HOME | End: 2025-02-26
Attending: INTERNAL MEDICINE
Payer: COMMERCIAL

## 2025-02-26 ENCOUNTER — TRANSFERRED RECORDS (OUTPATIENT)
Dept: HEALTH INFORMATION MANAGEMENT | Facility: CLINIC | Age: 75
End: 2025-02-26

## 2025-02-26 VITALS
HEART RATE: 64 BPM | TEMPERATURE: 97.7 F | DIASTOLIC BLOOD PRESSURE: 89 MMHG | OXYGEN SATURATION: 97 % | RESPIRATION RATE: 24 BRPM | SYSTOLIC BLOOD PRESSURE: 143 MMHG

## 2025-02-26 DIAGNOSIS — R16.0 LIVER MASS: ICD-10-CM

## 2025-02-26 PROCEDURE — 250N000009 HC RX 250: Performed by: STUDENT IN AN ORGANIZED HEALTH CARE EDUCATION/TRAINING PROGRAM

## 2025-02-26 PROCEDURE — 272N000436 US BIOPSY LIVER

## 2025-02-26 PROCEDURE — 250N000011 HC RX IP 250 OP 636: Performed by: STUDENT IN AN ORGANIZED HEALTH CARE EDUCATION/TRAINING PROGRAM

## 2025-02-26 PROCEDURE — 272N000710 US BIOPSY LIVER

## 2025-02-26 RX ORDER — FENTANYL CITRATE 50 UG/ML
25-50 INJECTION, SOLUTION INTRAMUSCULAR; INTRAVENOUS EVERY 5 MIN PRN
Status: DISCONTINUED | OUTPATIENT
Start: 2025-02-26 | End: 2025-02-26

## 2025-02-26 RX ORDER — FLUMAZENIL 0.1 MG/ML
0.2 INJECTION, SOLUTION INTRAVENOUS
Status: DISCONTINUED | OUTPATIENT
Start: 2025-02-26 | End: 2025-02-26

## 2025-02-26 RX ORDER — NALOXONE HYDROCHLORIDE 0.4 MG/ML
0.4 INJECTION, SOLUTION INTRAMUSCULAR; INTRAVENOUS; SUBCUTANEOUS
Status: DISCONTINUED | OUTPATIENT
Start: 2025-02-26 | End: 2025-02-26

## 2025-02-26 RX ORDER — NALOXONE HYDROCHLORIDE 0.4 MG/ML
0.2 INJECTION, SOLUTION INTRAMUSCULAR; INTRAVENOUS; SUBCUTANEOUS
Status: DISCONTINUED | OUTPATIENT
Start: 2025-02-26 | End: 2025-02-26

## 2025-02-26 RX ORDER — KETOROLAC TROMETHAMINE 15 MG/ML
15 INJECTION, SOLUTION INTRAMUSCULAR; INTRAVENOUS ONCE
Status: COMPLETED | OUTPATIENT
Start: 2025-02-26 | End: 2025-02-26

## 2025-02-26 RX ADMIN — KETOROLAC TROMETHAMINE 15 MG: 15 INJECTION, SOLUTION INTRAMUSCULAR; INTRAVENOUS at 09:24

## 2025-02-26 RX ADMIN — MIDAZOLAM HYDROCHLORIDE 1 MG: 1 INJECTION, SOLUTION INTRAMUSCULAR; INTRAVENOUS at 08:15

## 2025-02-26 RX ADMIN — FENTANYL CITRATE 50 MCG: 50 INJECTION INTRAMUSCULAR; INTRAVENOUS at 08:47

## 2025-02-26 RX ADMIN — FENTANYL CITRATE 50 MCG: 50 INJECTION INTRAMUSCULAR; INTRAVENOUS at 08:23

## 2025-02-26 RX ADMIN — MIDAZOLAM HYDROCHLORIDE 1 MG: 1 INJECTION, SOLUTION INTRAMUSCULAR; INTRAVENOUS at 08:23

## 2025-02-26 RX ADMIN — FENTANYL CITRATE 50 MCG: 50 INJECTION INTRAMUSCULAR; INTRAVENOUS at 08:15

## 2025-02-26 RX ADMIN — LIDOCAINE HYDROCHLORIDE 20 ML: 10 INJECTION, SOLUTION INFILTRATION; PERINEURAL at 08:21

## 2025-02-26 NOTE — DISCHARGE INSTRUCTIONS
1. You are required to have someone accompany you home. Do not drive or operate machinery today as the medication may cause sleepiness.    2. Rest today and avoid strenuous activity or heavy lifting for 48 hours. Over-activity may produce dizziness and or nausea.    3. You should follow your normal diet. Drink plenty of fluids. No alcoholic beverages for 24 hours. *(Alcohol may interact with the medications you received today)    4. Leave bandage on today, you may remove tomorrow.    5. You may shower tomorrow. Do not soak in a bath tub, hot tub, or swim until the site is completely healed and the skin glue is off. Keep the site clean and dry.    ADDITIONAL INSTRUCTIONS    When to call your Doctor:     1. Watch your biopsy site for signs of infection, increase pain, redness, swelling, or any drainage and or fever or chills.    2. On-going nausea, vomiting, or un-usual increase in pain.    3. If you experience any of the above or sudden weakness, dizziness, abdominal pain, flank pain or a temperature above 100.0 degree F for more than 24 hours, call your Doctor.    4. Sudden on-set of shortness of breath - call 911 or go to the emergency room.          * Recovery After Conscious Sedation (Adult)  We gave you medicine by vein to make you sleepy or relaxed during your procedure. This may have included both a pain medicine and sleeping medicine. Most of the effects have worn off. But you may still feel sleepy for the next 6 to 8 hours.  Home care  Follow these guidelines when you get home:  You may feel sleepy and clumsy and have poor balance for the next few hours.  A responsible adult should stay with you for the next 8 hours. This person should make sure your condition doesn t get worse.  Don't drink any alcohol for the next 24 hours.  Don't drive, operate dangerous machinery, make important business or personal decisions or sign legal documents during the next 24 hours.  You may vomit (throw up) if you eat too soon  after the procedure. If this happens, drink small amounts of water, juice or clear broth. Wait to try solid food until you no longer have nausea (upset stomach).  Note: Your care team may tell you not to take any medicine by mouth for pain or sleep in the next 4 hours. These medicines may react with the medicines you had in the hospital. This could cause a much stronger response than usual.  Follow-up care  Follow up with your care team if you are not alert and back to your usual level of activity within 12 hours.  When to seek medical advice  Call your care team right away if any of these occur:  You still feel sleepy or clumsy after 12 hours, or your sleepiness gets worse  Weakness or dizziness gets worse  Repeated vomiting  If you can't be woken up and someone is staying with you, they should call 911.  For informational purposes only. Not to replace the advice of your health care provider.  Copyright   2018 [employer]. All rights reserved.

## 2025-02-26 NOTE — PROCEDURES
[facility]    Procedure: Ultrasound guided liver biopsy with moderate sedation    Date/Time: 2/26/2025 8:45 AM    Performed by: Ted Austin MD  Authorized by: Ted Austin MD  IR Fellow Physician:    Pre Procedure Diagnosis: Liver mass  Post Procedure Diagnosis: Same    UNIVERSAL PROTOCOL   Site Marked: Yes  Prior Images Obtained and Reviewed:  Yes  Required items: Required blood products, implants, devices and special equipment available    Patient identity confirmed:  Verbally with patient, arm band and provided demographic data  Patient was reevaluated immediately before administering moderate or deep sedation or anesthesia  Confirmation Checklist:  Patient's identity using two indicators, relevant allergies and procedure was appropriate and matched the consent or emergent situation  Time out: Immediately prior to the procedure a time out was called    Universal Protocol: the Joint Commission Universal Protocol was followed    Preparation: Patient was prepped and draped in usual sterile fashion       ANESTHESIA    Anesthesia:  Local infiltration  Local Anesthetic:  Lidocaine 1% without epinephrine      SEDATION  Patient Sedated: Yes    Sedation Type:  Moderate (conscious) sedation  Sedation:  Fentanyl, midazolam and see MAR for details  Vital signs: Vital signs monitored during sedation    Findings: Successful US guided biopsy of liver mass - inferior right hepatic lobe satellite lesions targeted, 5 cores obtained, adequate by pathology prelim.     Specimens: core needle biopsy specimens sent for pathological analysis    Procedural Complications: None    Condition: Stable    Plan: Bedrest for 2 hours.       PROCEDURE    Length of time physician/provider present for 1:1 monitoring during sedation:  23-37 min

## 2025-02-26 NOTE — PRE-PROCEDURE
GENERAL PRE-PROCEDURE:   Procedure:  Image guided liver biopsy with moderate sedation  Date/Time:  2/26/2025 7:50 AM    Written consent obtained?: Yes    Risks and benefits: Risks, benefits and alternatives were discussed    Consent given by:  Patient  Patient states understanding of procedure being performed: Yes    Patient's understanding of procedure matches consent: Yes    Procedure consent matches procedure scheduled: Yes    Expected level of sedation:  Moderate  Appropriately NPO:  Yes  ASA Class:  1  Mallampati  :  Grade 1- soft palate, uvula, tonsillar pillars, and posterior pharyngeal wall visible  Lungs:  Lungs clear with good breath sounds bilaterally  Heart:  Normal heart sounds and rate  History & Physical reviewed:  History and physical reviewed and no updates needed  Statement of review:  I have reviewed the lab findings, diagnostic data, medications, and the plan for sedation

## 2025-02-26 NOTE — SEDATION DOCUMENTATION
Patient Name: David Samaniego  Medical Record Number: [id number]  Today's Date: 2/26/2025    Procedure: Image guided liver biopsy with moderate sedation  Proceduralist: Dr. Austin  Pathology present: yes    Procedure Start: 0814  Procedure end: 0837  Sedation medications administered: 2 mg versed, 150 mcg fentanyl     : na    Other Notes: Pt arrived to IR room 1 from radiology waiting room. Consent reviewed. Pt denies any questions or concerns regarding procedure. Pt positioned supine and monitored per protocol. Pt tolerated procedure without any noted complications. Pt transferred back to IR pre/post 1. Bedrest for 2 hours. Patient reporting 3/10 pain at puncture site post op, MD sylvester with additional fentanyl. Toradol given for pain per MD. Dr. Austin assessed patient before discharge at 1030, okay to discharge at this time.     Site clean, dry and intact. No hematoma noted at discharge. Discharge instructions given to patient and AVS provided. Wheelchair to car without incident.

## 2025-03-03 LAB
PATH REPORT.COMMENTS IMP SPEC: ABNORMAL
PATH REPORT.COMMENTS IMP SPEC: YES
PATH REPORT.FINAL DX SPEC: ABNORMAL
PATH REPORT.GROSS SPEC: ABNORMAL
PATH REPORT.MICROSCOPIC SPEC OTHER STN: ABNORMAL
PATH REPORT.MICROSCOPIC SPEC OTHER STN: ABNORMAL
PATH REPORT.RELEVANT HX SPEC: ABNORMAL
PHOTO IMAGE: ABNORMAL

## 2025-03-05 ENCOUNTER — ONCOLOGY VISIT (OUTPATIENT)
Dept: ONCOLOGY | Facility: HOSPITAL | Age: 75
End: 2025-03-05
Attending: INTERNAL MEDICINE
Payer: COMMERCIAL

## 2025-03-05 ENCOUNTER — PRE VISIT (OUTPATIENT)
Dept: ONCOLOGY | Facility: HOSPITAL | Age: 75
End: 2025-03-05
Payer: COMMERCIAL

## 2025-03-05 ENCOUNTER — PATIENT OUTREACH (OUTPATIENT)
Dept: ONCOLOGY | Facility: HOSPITAL | Age: 75
End: 2025-03-05

## 2025-03-05 ENCOUNTER — LAB (OUTPATIENT)
Dept: INFUSION THERAPY | Facility: HOSPITAL | Age: 75
End: 2025-03-05
Attending: INTERNAL MEDICINE
Payer: COMMERCIAL

## 2025-03-05 VITALS
HEART RATE: 93 BPM | BODY MASS INDEX: 25.39 KG/M2 | OXYGEN SATURATION: 98 % | RESPIRATION RATE: 16 BRPM | SYSTOLIC BLOOD PRESSURE: 168 MMHG | HEIGHT: 77 IN | WEIGHT: 215 LBS | DIASTOLIC BLOOD PRESSURE: 90 MMHG

## 2025-03-05 DIAGNOSIS — Z11.59 NEED FOR HEPATITIS B SCREENING TEST: ICD-10-CM

## 2025-03-05 DIAGNOSIS — R16.0 LIVER MASS: ICD-10-CM

## 2025-03-05 DIAGNOSIS — Z91.89 ENCOUNTER FOR HCV SCREENING TEST FOR HIGH RISK PATIENT: ICD-10-CM

## 2025-03-05 DIAGNOSIS — E83.52 HYPERCALCEMIA: ICD-10-CM

## 2025-03-05 DIAGNOSIS — C22.0 HCC (HEPATOCELLULAR CARCINOMA) (H): ICD-10-CM

## 2025-03-05 DIAGNOSIS — C22.1 INTRAHEPATIC BILE DUCT CARCINOMA (H): ICD-10-CM

## 2025-03-05 DIAGNOSIS — C77.2 ADENOCARCINOMA METASTATIC TO INTRA-ABDOMINAL LYMPH NODE (H): ICD-10-CM

## 2025-03-05 DIAGNOSIS — Z85.07 PERSONAL HISTORY OF MALIGNANT NEOPLASM OF PANCREAS: ICD-10-CM

## 2025-03-05 DIAGNOSIS — Z11.59 ENCOUNTER FOR HCV SCREENING TEST FOR HIGH RISK PATIENT: ICD-10-CM

## 2025-03-05 LAB
AFP SERPL-MCNC: 4 NG/ML
ALBUMIN SERPL BCG-MCNC: 3.7 G/DL (ref 3.5–5.2)
ALP SERPL-CCNC: 513 U/L (ref 40–150)
ALT SERPL W P-5'-P-CCNC: 37 U/L (ref 0–70)
ANION GAP SERPL CALCULATED.3IONS-SCNC: 13 MMOL/L (ref 7–15)
AST SERPL W P-5'-P-CCNC: 49 U/L (ref 0–45)
BASOPHILS # BLD AUTO: 0 10E3/UL (ref 0–0.2)
BASOPHILS NFR BLD AUTO: 0 %
BILIRUB SERPL-MCNC: 0.6 MG/DL
BUN SERPL-MCNC: 28.2 MG/DL (ref 8–23)
CALCIUM SERPL-MCNC: 12.4 MG/DL (ref 8.8–10.4)
CHLORIDE SERPL-SCNC: 103 MMOL/L (ref 98–107)
CREAT SERPL-MCNC: 1.77 MG/DL (ref 0.67–1.17)
EGFRCR SERPLBLD CKD-EPI 2021: 39 ML/MIN/1.73M2
EOSINOPHIL # BLD AUTO: 0.1 10E3/UL (ref 0–0.7)
EOSINOPHIL NFR BLD AUTO: 1 %
ERYTHROCYTE [DISTWIDTH] IN BLOOD BY AUTOMATED COUNT: 13.2 % (ref 10–15)
GLUCOSE SERPL-MCNC: 107 MG/DL (ref 70–99)
HCO3 SERPL-SCNC: 22 MMOL/L (ref 22–29)
HCT VFR BLD AUTO: 33.6 % (ref 40–53)
HGB BLD-MCNC: 11.6 G/DL (ref 13.3–17.7)
IMM GRANULOCYTES # BLD: 0.1 10E3/UL
IMM GRANULOCYTES NFR BLD: 1 %
INR PPP: 1.1 (ref 0.85–1.15)
LDH SERPL L TO P-CCNC: 221 U/L (ref 0–250)
LYMPHOCYTES # BLD AUTO: 0.8 10E3/UL (ref 0.8–5.3)
LYMPHOCYTES NFR BLD AUTO: 7 %
MCH RBC QN AUTO: 29.1 PG (ref 26.5–33)
MCHC RBC AUTO-ENTMCNC: 34.5 G/DL (ref 31.5–36.5)
MCV RBC AUTO: 84 FL (ref 78–100)
MONOCYTES # BLD AUTO: 0.8 10E3/UL (ref 0–1.3)
MONOCYTES NFR BLD AUTO: 7 %
NEUTROPHILS # BLD AUTO: 9.7 10E3/UL (ref 1.6–8.3)
NEUTROPHILS NFR BLD AUTO: 84 %
NRBC # BLD AUTO: 0 10E3/UL
NRBC BLD AUTO-RTO: 0 /100
PLATELET # BLD AUTO: 322 10E3/UL (ref 150–450)
POTASSIUM SERPL-SCNC: 4 MMOL/L (ref 3.4–5.3)
PROT SERPL-MCNC: 7.5 G/DL (ref 6.4–8.3)
RBC # BLD AUTO: 3.98 10E6/UL (ref 4.4–5.9)
SODIUM SERPL-SCNC: 138 MMOL/L (ref 135–145)
WBC # BLD AUTO: 11.6 10E3/UL (ref 4–11)

## 2025-03-05 PROCEDURE — 99205 OFFICE O/P NEW HI 60 MIN: CPT | Performed by: INTERNAL MEDICINE

## 2025-03-05 PROCEDURE — 36415 COLL VENOUS BLD VENIPUNCTURE: CPT

## 2025-03-05 PROCEDURE — 82105 ALPHA-FETOPROTEIN SERUM: CPT

## 2025-03-05 PROCEDURE — 85048 AUTOMATED LEUKOCYTE COUNT: CPT

## 2025-03-05 PROCEDURE — G0463 HOSPITAL OUTPT CLINIC VISIT: HCPCS | Performed by: INTERNAL MEDICINE

## 2025-03-05 PROCEDURE — 86301 IMMUNOASSAY TUMOR CA 19-9: CPT

## 2025-03-05 PROCEDURE — 83615 LACTATE (LD) (LDH) ENZYME: CPT

## 2025-03-05 PROCEDURE — G0452 MOLECULAR PATHOLOGY INTERPR: HCPCS | Mod: 26 | Performed by: STUDENT IN AN ORGANIZED HEALTH CARE EDUCATION/TRAINING PROGRAM

## 2025-03-05 PROCEDURE — 85004 AUTOMATED DIFF WBC COUNT: CPT

## 2025-03-05 PROCEDURE — 85610 PROTHROMBIN TIME: CPT

## 2025-03-05 PROCEDURE — 84155 ASSAY OF PROTEIN SERUM: CPT

## 2025-03-05 PROCEDURE — 81162 BRCA1&2 GEN FULL SEQ DUP/DEL: CPT | Performed by: INTERNAL MEDICINE

## 2025-03-05 PROCEDURE — 82374 ASSAY BLOOD CARBON DIOXIDE: CPT

## 2025-03-05 PROCEDURE — G2211 COMPLEX E/M VISIT ADD ON: HCPCS | Performed by: INTERNAL MEDICINE

## 2025-03-05 ASSESSMENT — PAIN SCALES - GENERAL: PAINLEVEL_OUTOF10: MILD PAIN (3)

## 2025-03-05 NOTE — LETTER
3/5/2025      David Samaniego  [id number] [address] [id number]      Dear Colleague,    Thank you for referring your patient, David Samaniego, to the [facility] [place]. Please see a copy of my visit note below.    [facility] Hematology and Oncology Consult Note    Patient: David Samaniego  MRN: [id number]  Date of Service: Mar 5, 2025       Reason for Visit    Chief Complaint   Patient presents with     Oncology Clinic Visit     New consult Liver bx + poorly diff carcinoma,             ECOG Performance 0 - Independent        _____________________________________________________________________________    History Of Present Illness    Mr. David Samaniego is a very pleasant 74 year old male who reports that he had not been feeling well for the last many months.  He has had a feeling of bloating and some discomfort in his abdomen and a poor appetite.  He in total has lost about 10 pounds.  He presented to his primary care physician with these complaints and a CT of the abdomen and pelvis was recommended.  This was done on 14 February which showed a large mass replacing the right lobe of the liver with multiple satellite lesions and intrahepatic biliary dilatation and extensive upper abdominal and pericardiophrenic adenopathy.  Patient then underwent a biopsy of one of the liver lesions.  This is being signed out as a poorly differentiated carcinoma but the origin of origin could not be determined.  Some additional testing is pending and I did review this pathology with the pathologist on the case in detail today.  Patient is accompanied by his wife, daughter and son-in-law.  Besides the discomfort in his abdomen he has no other complaints except a decrease in appetite.  He has continued to be active he denies having any nausea or vomiting denies any fever chills he also denies any change in his bowel habits.        Review of systems.  Apart from describing in history, the remainder of  comprehensive ROS was negative.    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     No Comments Provided     Enlarged prostate without lower urinary tract symptoms (luts)     No Comments Provided     Essential (primary) hypertension     No Comments Provided     Gastro-esophageal reflux disease without esophagitis     No Comments Provided     Hypothyroidism     No Comments Provided     Other specified congenital malformations of intestine     No Comments Provided     PONV (postoperative nausea and vomiting)     with surgeries in the 80's     Past Surgical History:   Procedure Laterality Date     BACK SURGERY      1986 and 1987     COLONOSCOPY      7/29/05,next due 2015     COLONOSCOPY  10/26/2015    Tubular adenoma,F/U 2020     COLONOSCOPY N/A 11/16/2020    F/U 2025 tubular adenomas     CYSTOSCOPY, TRANSURETHRAL RESECTION (TUR) PROSTATE, COMBINED N/A 07/30/2019    Procedure: CYSTOSCOPY, WITH TRANSURETHRAL RESECTION (TUR) PROSTATE;  Surgeon: Art Liu MD;  Location: [facility] OR     OTHER SURGICAL HISTORY      [id number],PARTIAL THYROIDECTOMY     OTHER SURGICAL HISTORY      [id number],ANESTHESIA ALERT,Notes no prior complications from anesthesia.:     Z TUR PROSTATE BIOPSIES       Family History   Problem Relation Age of Onset     Breast Cancer Mother         Cancer-breast,and progressed to brain cancer     Other - See Comments Mother         Stroke     Other - See Comments Father         Parkinson /dementia     Heart Disease No family hx of         Heart Disease     Diabetes No family hx of         Diabetes     Malignant Hyperthermia No family hx of      Anesthesia Reaction No family hx of      Social History     Socioeconomic History     Marital status:    Tobacco Use     Smoking status: Never     Passive exposure: Never     Smokeless tobacco: Never   Vaping Use      Vaping status: Never Used   Substance and Sexual Activity     Alcohol use: Yes     Comment: Alcoholic Drinks/day: occasionally     Drug use: Never     Comment: Drug use: No     Sexual activity: Yes     Partners: Female   Other Topics Concern     Parent/sibling w/ CABG, MI or angioplasty before 65F 55M? No   Social History Narrative    Alcohol Use - yes        Works out 3 days per week.  Retired.    Preload 03/08/13     Social Drivers of Health     Financial Resource Strain: Low Risk  (4/9/2024)    Financial Resource Strain      Within the past 12 months, have you or your family members you live with been unable to get utilities (heat, electricity) when it was really needed?: No   Food Insecurity: Low Risk  (4/9/2024)    Food Insecurity      Within the past 12 months, did you worry that your food would run out before you got money to buy more?: No      Within the past 12 months, did the food you bought just not last and you didn t have money to get more?: No   Transportation Needs: Low Risk  (4/9/2024)    Transportation Needs      Within the past 12 months, has lack of transportation kept you from medical appointments, getting your medicines, non-medical meetings or appointments, work, or from getting things that you need?: No   Physical Activity: Sufficiently Active (4/9/2024)    Exercise Vital Sign      Days of Exercise per Week: 3 days      Minutes of Exercise per Session: 50 min   Stress: Stress Concern Present (4/9/2024)    [employer] - Occupational Stress Questionnaire      Feeling of Stress : To some extent   Social Connections: Unknown (4/9/2024)    Social Connection and Isolation Panel [NHANES]      Frequency of Social Gatherings with Friends and Family: Once a week   Interpersonal Safety: Low Risk  (2/26/2025)    Interpersonal Safety      Do you feel physically and emotionally safe where you currently live?: Yes      Within the past 12 months, have you been hit, slapped,  "kicked or otherwise physically hurt by someone?: No      Within the past 12 months, have you been humiliated or emotionally abused in other ways by your partner or ex-partner?: No   Housing Stability: Low Risk  (4/9/2024)    Housing Stability      Do you have housing? : Yes      Are you worried about losing your housing?: No       Allergies    No Known Allergies    Physical Exam    BP (!) 168/90 (BP Location: Right arm, Patient Position: Sitting, Cuff Size: Adult Regular)   Pulse 93   Resp 16   Ht 1.943 m (6' 4.5\")   Wt 97.5 kg (215 lb)   SpO2 98%   BMI 25.83 kg/m      General: alert, awake, not in acute distress  HEENT: Head: Normal, normocephalic, atraumatic.  Eye: Normal external eye, conjunctiva, lids cornea, DARY.  Nose: Normal external nose, mucus membranes and septum.  Pharynx: Normal buccal mucosa. Normal pharynx.  Neck / Thyroid: Supple, no masses, nodes, nodules or enlargement.  Lymphatics: No abnormally enlarged lymph nodes.  Chest: Normal chest wall and respirations. Clear to auscultation.  Heart: S1 S2 RRR, no murmur.   Abdomen: abdomen is soft without significant tenderness, masses, organomegaly or guarding  Extremities: normal strength, tone, and muscle mass  Skin: normal. no rash or abnormalities  CNS: non focal.    Lab Results    No results found for this or any previous visit (from the past week).    Imaging Results    US Biopsy Liver    Result Date: 2/26/2025  EXAM: 1. PERCUTANEOUS BIOPSY LIVER 2. ULTRASOUND GUIDANCE 3. CONSCIOUS SEDATION LOCATION: [facility] DATE: 2/26/2025 INDICATION: Infiltrative hepatic mass and smaller satellite lesions, suspected primary liver biliary malignancy. PROCEDURE: Informed consent obtained. Site marked. Prior images reviewed. Required items made available. Patient identity was confirmed verbally and with arm band. Patient reevaluated immediately before administering sedation. Universal protocol was followed. Time out performed. The " site was prepped and draped in sterile fashion. 10 mL of 1 percent lidocaine was infused into the local soft tissues. Using standard technique and under direct ultrasound guidance, a 18 gauge biopsy needle was used to make 5 core biopsies. Tissue was submitted to Pathology and adequate by preliminary review. The patient tolerated the procedure well. No complications. SEDATION: Versed 2 mg. Fentanyl 100 mcg. The procedure was performed with administration intravenous conscious sedation with appropriate preoperative, intraoperative, and postoperative evaluation. 33 minutes of supervised face to face conscious sedation time was provided by a radiology nurse under my direct supervision.     IMPRESSION: Status post US-guided biopsy of hepatic mass. Reference CPT Code: 82311, 75141, 98210, 90402    CT Abdomen Pelvis w Contrast    Result Date: 2/17/2025  EXAM: CT ABDOMEN PELVIS W CONTRAST LOCATION: [facility] DATE: 2/14/2025 INDICATION: Vague epigastric pain for the last 5 months. Hypercalcemia. COMPARISON: CT urogram 10/17/2022 TECHNIQUE: CT scan of the abdomen and pelvis was performed following injection of IV contrast. Multiplanar reformats were obtained. Dose reduction techniques were used. CONTRAST: isovue 370 90ml FINDINGS: LOWER CHEST: There are multiple, small pericardiophrenic nodes , largest measuring 78 mm (axial images 29-44). Mild cylindrical bronchiectasis. No pulmonary nodules or effusions. HEPATOBILIARY: Liver is markedly abnormal. There is a poorly defined, infiltrative heterogeneously hypodense mass in the right lobe with marked capsular contraction and numerous adjacent satellite lesions. Primary lesion measures at least 12 x 10 x 12 cm. Largest satellite lesion near the dome measures approximately 6 x 5 cm (axial image 33). There is mass effect upon the right portal vein branches with a short segment of presumed tumor thrombus in the superior portal vein branch (axial images  74-78 and coronal images 51-53). There areas of intrahepatic biliary dilatation due to the mass. In retrospect, a poorly 3 cm subcapsular lesion can be seen in the right lobe on the prior study. Numerous dependent gallstones. PANCREAS: Normal. SPLEEN: Spleen has increased in size to 14 cm. ADRENAL GLANDS: Normal. KIDNEYS/BLADDER: Right renal cyst again noted. No calculi or obstruction. BOWEL: No ascites or peritoneal tumor nodules. Quite redundant colon. No inflammatory changes. Appendix is normal. LYMPH NODES: Multiple nodes are seen in the gastrohepatic ligament, the shawn hepatis and in the upper aortocaval space. Largest node is in the shawn hepatis measuring 2 x 1.3 cm (coronal image 46, axial image 76).  VASCULATURE: The main portal, SMV and splenic veins are patent. Mild arterial calcifications. No aneurysm. PELVIC ORGANS: Prostate is quite enlarged and unchanged. MUSCULOSKELETAL: No suspicious bone or soft tissue lesions. Moderate spondylitic changes at L4-5.     IMPRESSION: 1.  Patient has findings suspicious for hepatocellular carcinoma. 2.  There is a large mass replacing the right lobe of the liver with capsular contraction, satellite lesions, intrahepatic biliary dilatation, presumed tumor thrombus in the superior branch of the right portal vein and extensive upper abdominal and pericardiophrenic adenopathy. 3.  New mild splenomegaly. 4.  Cholelithiasis. 5.  Prostate is quite enlarged. 6.  Findings discussed by the undersigned with Dr. Jose Montes  at time of  dictation evening of 2/14/25 and with Tyra nurse who works with Dr. Mckeon at 1305 on 2/17/25.         Assessment/Plan    Metastatic poorly differentiated adenocarcinoma  Is a 74-year-old white male presenting with a large liver lesion with satellite lesions and abdominal lymphadenopathy which on biopsy is consistent with a poorly differentiated adenocarcinoma.  I did review the pathology in detail with Dr. Olmedo in the pathology  department.  He is still waiting for some special testing but the staining pattern is atypical and actually argues against hepatocellular carcinoma.  Patient also does not has a history of alcoholic cirrhosis or hepatitis and on my review of his imaging with our radiology colleagues the rest of the liver parenchyma looks normal and this could be very well consistent with an intrahepatic cholangiocarcinoma.  I will wait for the additional pathology testing to come back in the meantime I will check tumor markers and check patient for alpha-fetoprotein, CA 19-9 along with an LDH CBC and a comprehensive metabolic panel.  I will also like to get whole-body PET scan as imaging of the CT chest have not been done so far I will see the patient once the above-mentioned workup is complete and the treatment plan could be formulated at that time.  I did however discuss with the family that what ever the final pathology is this does look like an incurable situation because the extent of disease and all treatment will be palliative in nature.  Another and resting and pertinent piece of information is that patient's daughter is BRCA1 positive and has dealt with breast cancer and the patient is a most likely carrier of the BRCA1 gene as his wife has been tested and is negative but he never got tested I will plan to go ahead with BRCA testing for the patient I will also send tumor tissue for an NGS panel.  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 however final additional stains on pathology are pending we will wait for them  2) plan to check tumor markers alpha-fetoprotein's and CA 19-9 Along with blood counts and comprehensive metabolic panel and hepatitis B and C serologies  3) whole-body PET scan  4) BRCA testing as patient's daughter is BRCA1 positive and his wife is negative the patient never got tested  5) patient tumor tissue to be sent for foundation 1 testing          Signed by: Aylin  "[facility] Rooming Note    March 5, 2025 2:40 PM   David Samaniego is a 74 year old male who presents for:    Chief Complaint   Patient presents with     Oncology Clinic Visit     New consult Liver bx + poorly diff carcinoma,     Initial Vitals: BP (!) 168/90 (BP Location: Right arm, Patient Position: Sitting, Cuff Size: Adult Regular)   Pulse 93   Resp 16   Ht 1.943 m (6' 4.5\")   Wt 97.5 kg (215 lb)   SpO2 98%   BMI 25.83 kg/m   Estimated body mass index is 25.83 kg/m  as calculated from the following:    Height as of this encounter: 1.943 m (6' 4.5\").    Weight as of this encounter: 97.5 kg (215 lb). Body surface area is 2.29 meters squared.  Mild Pain (3) Comment: Data Unavailable   No LMP for male patient.  Allergies reviewed: Yes  Medications reviewed: Yes    Medications: Medication refills not needed today.  Pharmacy name entered into [employer]: [facility] - [address]    Frailty Screening:   Is the patient here for a new oncology consult visit in cancer care? 1. Yes. Over the past month, have you experienced difficulty or required a caregiver to assist with:   1. Balance, walking or general mobility (including any falls)? NO  2. Completion of self-care tasks such as bathing, dressing, toileting, grooming/hygiene?  NO  3. Concentration or memory that affects your daily life?  NO     PHQ9:  Did this patient require a PHQ9?: No      Clinical concerns:  new consult       Eli Remy              Again, thank you for allowing me to participate in the care of your patient.        Sincerely,        Aylin Gaviria MD    Electronically signed"

## 2025-03-05 NOTE — PROGRESS NOTES
[facility] Hematology and Oncology Consult Note    Patient: David Samaniego  MRN: [id number]  Date of Service: Mar 5, 2025       Reason for Visit    Chief Complaint   Patient presents with    Oncology Clinic Visit     New consult Liver bx + poorly diff carcinoma,             ECOG Performance 0 - Independent        _____________________________________________________________________________    History Of Present Illness    Mr. David Samaniego is a very pleasant 74 year old male who reports that he had not been feeling well for the last many months.  He has had a feeling of bloating and some discomfort in his abdomen and a poor appetite.  He in total has lost about 10 pounds.  He presented to his primary care physician with these complaints and a CT of the abdomen and pelvis was recommended.  This was done on 14 February which showed a large mass replacing the right lobe of the liver with multiple satellite lesions and intrahepatic biliary dilatation and extensive upper abdominal and pericardiophrenic adenopathy.  Patient then underwent a biopsy of one of the liver lesions.  This is being signed out as a poorly differentiated carcinoma but the origin of origin could not be determined.  Some additional testing is pending and I did review this pathology with the pathologist on the case in detail today.  Patient is accompanied by his wife, daughter and son-in-law.  Besides the discomfort in his abdomen he has no other complaints except a decrease in appetite.  He has continued to be active he denies having any nausea or vomiting denies any fever chills he also denies any change in his bowel habits.        Review of systems.  Apart from describing in history, the remainder of comprehensive ROS was negative.    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     No Comments  Provided    Enlarged prostate without lower urinary tract symptoms (luts)     No Comments Provided    Essential (primary) hypertension     No Comments Provided    Gastro-esophageal reflux disease without esophagitis     No Comments Provided    Hypothyroidism     No Comments Provided    Other specified congenital malformations of intestine     No Comments Provided    PONV (postoperative nausea and vomiting)     with surgeries in the 80's     Past Surgical History:   Procedure Laterality Date    BACK SURGERY      1986 and 1987    COLONOSCOPY      7/29/05,next due 2015    COLONOSCOPY  10/26/2015    Tubular adenoma,F/U 2020    COLONOSCOPY N/A 11/16/2020    F/U 2025 tubular adenomas    CYSTOSCOPY, TRANSURETHRAL RESECTION (TUR) PROSTATE, COMBINED N/A 07/30/2019    Procedure: CYSTOSCOPY, WITH TRANSURETHRAL RESECTION (TUR) PROSTATE;  Surgeon: Art Liu MD;  Location: [facility] OR    OTHER SURGICAL HISTORY      [id number],PARTIAL THYROIDECTOMY    OTHER SURGICAL HISTORY      [id number],ANESTHESIA ALERT,Notes no prior complications from anesthesia.:    ZZC TUR PROSTATE BIOPSIES       Family History   Problem Relation Age of Onset    Breast Cancer Mother         Cancer-breast,and progressed to brain cancer    Other - See Comments Mother         Stroke    Other - See Comments Father         Parkinson /dementia    Heart Disease No family hx of         Heart Disease    Diabetes No family hx of         Diabetes    Malignant Hyperthermia No family hx of     Anesthesia Reaction No family hx of      Social History     Socioeconomic History    Marital status:    Tobacco Use    Smoking status: Never     Passive exposure: Never    Smokeless tobacco: Never   Vaping Use    Vaping status: Never Used   Substance and Sexual Activity    Alcohol use: Yes     Comment: Alcoholic Drinks/day: occasionally    Drug use: Never     Comment: Drug use: No    Sexual activity: Yes     Partners: Female   Other Topics Concern    Parent/sibling w/ CABG, MI or  angioplasty before 65F 55M? No   Social History Narrative    Alcohol Use - yes        Works out 3 days per week.  Retired.    Preload 03/08/13     Social Drivers of Health     Financial Resource Strain: Low Risk  (4/9/2024)    Financial Resource Strain     Within the past 12 months, have you or your family members you live with been unable to get utilities (heat, electricity) when it was really needed?: No   Food Insecurity: Low Risk  (4/9/2024)    Food Insecurity     Within the past 12 months, did you worry that your food would run out before you got money to buy more?: No     Within the past 12 months, did the food you bought just not last and you didn t have money to get more?: No   Transportation Needs: Low Risk  (4/9/2024)    Transportation Needs     Within the past 12 months, has lack of transportation kept you from medical appointments, getting your medicines, non-medical meetings or appointments, work, or from getting things that you need?: No   Physical Activity: Sufficiently Active (4/9/2024)    Exercise Vital Sign     Days of Exercise per Week: 3 days     Minutes of Exercise per Session: 50 min   Stress: Stress Concern Present (4/9/2024)    [employer] [place] [employer] - Occupational Stress Questionnaire     Feeling of Stress : To some extent   Social Connections: Unknown (4/9/2024)    Social Connection and Isolation Panel [NHANES]     Frequency of Social Gatherings with Friends and Family: Once a week   Interpersonal Safety: Low Risk  (2/26/2025)    Interpersonal Safety     Do you feel physically and emotionally safe where you currently live?: Yes     Within the past 12 months, have you been hit, slapped, kicked or otherwise physically hurt by someone?: No     Within the past 12 months, have you been humiliated or emotionally abused in other ways by your partner or ex-partner?: No   Housing Stability: Low Risk  (4/9/2024)    Housing Stability     Do you have housing? : Yes     Are you  "worried about losing your housing?: No       Allergies    No Known Allergies    Physical Exam    BP (!) 168/90 (BP Location: Right arm, Patient Position: Sitting, Cuff Size: Adult Regular)   Pulse 93   Resp 16   Ht 1.943 m (6' 4.5\")   Wt 97.5 kg (215 lb)   SpO2 98%   BMI 25.83 kg/m      General: alert, awake, not in acute distress  HEENT: Head: Normal, normocephalic, atraumatic.  Eye: Normal external eye, conjunctiva, lids cornea, DARY.  Nose: Normal external nose, mucus membranes and septum.  Pharynx: Normal buccal mucosa. Normal pharynx.  Neck / Thyroid: Supple, no masses, nodes, nodules or enlargement.  Lymphatics: No abnormally enlarged lymph nodes.  Chest: Normal chest wall and respirations. Clear to auscultation.  Heart: S1 S2 RRR, no murmur.   Abdomen: abdomen is soft without significant tenderness, masses, organomegaly or guarding  Extremities: normal strength, tone, and muscle mass  Skin: normal. no rash or abnormalities  CNS: non focal.    Lab Results    No results found for this or any previous visit (from the past week).    Imaging Results    US Biopsy Liver    Result Date: 2/26/2025  EXAM: 1. PERCUTANEOUS BIOPSY LIVER 2. ULTRASOUND GUIDANCE 3. CONSCIOUS SEDATION LOCATION: [facility] DATE: 2/26/2025 INDICATION: Infiltrative hepatic mass and smaller satellite lesions, suspected primary liver biliary malignancy. PROCEDURE: Informed consent obtained. Site marked. Prior images reviewed. Required items made available. Patient identity was confirmed verbally and with arm band. Patient reevaluated immediately before administering sedation. Universal protocol was followed. Time out performed. The site was prepped and draped in sterile fashion. 10 mL of 1 percent lidocaine was infused into the local soft tissues. Using standard technique and under direct ultrasound guidance, a 18 gauge biopsy needle was used to make 5 core biopsies. Tissue was submitted to Pathology and adequate by " preliminary review. The patient tolerated the procedure well. No complications. SEDATION: Versed 2 mg. Fentanyl 100 mcg. The procedure was performed with administration intravenous conscious sedation with appropriate preoperative, intraoperative, and postoperative evaluation. 33 minutes of supervised face to face conscious sedation time was provided by a radiology nurse under my direct supervision.     IMPRESSION: Status post US-guided biopsy of hepatic mass. Reference CPT Code: 47272, 12622, 95725, 26637    CT Abdomen Pelvis w Contrast    Result Date: 2/17/2025  EXAM: CT ABDOMEN PELVIS W CONTRAST LOCATION: [facility] DATE: 2/14/2025 INDICATION: Vague epigastric pain for the last 5 months. Hypercalcemia. COMPARISON: CT urogram 10/17/2022 TECHNIQUE: CT scan of the abdomen and pelvis was performed following injection of IV contrast. Multiplanar reformats were obtained. Dose reduction techniques were used. CONTRAST: isovue 370 90ml FINDINGS: LOWER CHEST: There are multiple, small pericardiophrenic nodes , largest measuring 78 mm (axial images 29-44). Mild cylindrical bronchiectasis. No pulmonary nodules or effusions. HEPATOBILIARY: Liver is markedly abnormal. There is a poorly defined, infiltrative heterogeneously hypodense mass in the right lobe with marked capsular contraction and numerous adjacent satellite lesions. Primary lesion measures at least 12 x 10 x 12 cm. Largest satellite lesion near the dome measures approximately 6 x 5 cm (axial image 33). There is mass effect upon the right portal vein branches with a short segment of presumed tumor thrombus in the superior portal vein branch (axial images 74-78 and coronal images 51-53). There areas of intrahepatic biliary dilatation due to the mass. In retrospect, a poorly 3 cm subcapsular lesion can be seen in the right lobe on the prior study. Numerous dependent gallstones. PANCREAS: Normal. SPLEEN: Spleen has increased in size to 14 cm.  ADRENAL GLANDS: Normal. KIDNEYS/BLADDER: Right renal cyst again noted. No calculi or obstruction. BOWEL: No ascites or peritoneal tumor nodules. Quite redundant colon. No inflammatory changes. Appendix is normal. LYMPH NODES: Multiple nodes are seen in the gastrohepatic ligament, the shawn hepatis and in the upper aortocaval space. Largest node is in the shawn hepatis measuring 2 x 1.3 cm (coronal image 46, axial image 76).  VASCULATURE: The main portal, SMV and splenic veins are patent. Mild arterial calcifications. No aneurysm. PELVIC ORGANS: Prostate is quite enlarged and unchanged. MUSCULOSKELETAL: No suspicious bone or soft tissue lesions. Moderate spondylitic changes at L4-5.     IMPRESSION: 1.  Patient has findings suspicious for hepatocellular carcinoma. 2.  There is a large mass replacing the right lobe of the liver with capsular contraction, satellite lesions, intrahepatic biliary dilatation, presumed tumor thrombus in the superior branch of the right portal vein and extensive upper abdominal and pericardiophrenic adenopathy. 3.  New mild splenomegaly. 4.  Cholelithiasis. 5.  Prostate is quite enlarged. 6.  Findings discussed by the undersigned with Dr. Jose Montes  at time of  dictation evening of 2/14/25 and with nurse Tyra who works with Dr. Mckeon at 1305 on 2/17/25.         Assessment/Plan    Metastatic poorly differentiated adenocarcinoma  Is a 74-year-old white male presenting with a large liver lesion with satellite lesions and abdominal lymphadenopathy which on biopsy is consistent with a poorly differentiated adenocarcinoma.  I did review the pathology in detail with Dr. Olmedo in the pathology department.  He is still waiting for some special testing but the staining pattern is atypical and actually argues against hepatocellular carcinoma.  Patient also does not has a history of alcoholic cirrhosis or hepatitis and on my review of his imaging with our radiology colleagues the rest of  the liver parenchyma looks normal and this could be very well consistent with an intrahepatic cholangiocarcinoma.  I will wait for the additional pathology testing to come back in the meantime I will check tumor markers and check patient for alpha-fetoprotein, CA 19-9 along with an LDH CBC and a comprehensive metabolic panel.  I will also like to get whole-body PET scan as imaging of the CT chest have not been done so far I will see the patient once the above-mentioned workup is complete and the treatment plan could be formulated at that time.  I did however discuss with the family that what ever the final pathology is this does look like an incurable situation because the extent of disease and all treatment will be palliative in nature.  Another and resting and pertinent piece of information is that patient's daughter is BRCA1 positive and has dealt with breast cancer and the patient is a most likely carrier of the BRCA1 gene as his wife has been tested and is negative but he never got tested I will plan to go ahead with BRCA testing for the patient I will also send tumor tissue for an NGS panel.  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 however final additional stains on pathology are pending we will wait for them  2) plan to check tumor markers alpha-fetoprotein's and CA 19-9 Along with blood counts and comprehensive metabolic panel and hepatitis B and C serologies  3) whole-body PET scan  4) BRCA testing as patient's daughter is BRCA1 positive and his wife is negative the patient never got tested  5) patient tumor tissue to be sent for foundation 1 testing          Signed by: Aylin Gaviria MD Oncology Rooming Note    March 5, 2025 2:40 PM   David Samaniego is a 74 year old male who presents for:    Chief Complaint   Patient presents with    Oncology Clinic Visit     New consult Liver bx + poorly diff carcinoma,     Initial Vitals: BP (!) 168/90 (BP Location: Right arm, Patient  "Position: Sitting, Cuff Size: Adult Regular)   Pulse 93   Resp 16   Ht 1.943 m (6' 4.5\")   Wt 97.5 kg (215 lb)   SpO2 98%   BMI 25.83 kg/m   Estimated body mass index is 25.83 kg/m  as calculated from the following:    Height as of this encounter: 1.943 m (6' 4.5\").    Weight as of this encounter: 97.5 kg (215 lb). Body surface area is 2.29 meters squared.  Mild Pain (3) Comment: Data Unavailable   No LMP for male patient.  Allergies reviewed: Yes  Medications reviewed: Yes    Medications: Medication refills not needed today.  Pharmacy name entered into [employer]: [facility] - [address]    Frailty Screening:   Is the patient here for a new oncology consult visit in cancer care? 1. Yes. Over the past month, have you experienced difficulty or required a caregiver to assist with:   1. Balance, walking or general mobility (including any falls)? NO  2. Completion of self-care tasks such as bathing, dressing, toileting, grooming/hygiene?  NO  3. Concentration or memory that affects your daily life?  NO     PHQ9:  Did this patient require a PHQ9?: No      Clinical concerns:  new consult       Eli Remy            "

## 2025-03-05 NOTE — PROGRESS NOTES
[facility]: Cancer Care                                                                                          Met with Anuel   when he came to clinic for consult with Dr. Gaviria, dx poorly differentiated carcinoma and further testing is in process to determine the origin. He was accompanied by his wife, Sofia daughter  Alec and son in law, Jason. Introduced self and role. They were all teary today and feeling overwhelmed with the diagnosis and uncertainty of next steps. We reviewed plan of labs being drawn today, awaiting additional testing of the pathology and PET imaging. He will be scheduled for a follow up visit with dr. Gaviria after the PET scan is completed to review the workup and treatment recommendations. They are flexible with where they will go for the PET imaging. Consent to communicate was completed today and was scanned into Epic. Contact information was provided and calls invited if any questions arise prior to the next visit.  Of note, Anuel was scheduled for first stat PET on 3/12. Schedulers will call [facility] on 3/6 to see if earlier apt can be obtained. Followup will be scheduled once this is complete.    Signature:  Patricia Mack RN

## 2025-03-06 ENCOUNTER — PATIENT OUTREACH (OUTPATIENT)
Dept: ONCOLOGY | Facility: HOSPITAL | Age: 75
End: 2025-03-06
Payer: COMMERCIAL

## 2025-03-06 ENCOUNTER — TELEPHONE (OUTPATIENT)
Dept: ONCOLOGY | Facility: HOSPITAL | Age: 75
End: 2025-03-06
Payer: COMMERCIAL

## 2025-03-06 DIAGNOSIS — C22.1 INTRAHEPATIC BILE DUCT CARCINOMA (H): Primary | ICD-10-CM

## 2025-03-06 NOTE — PROGRESS NOTES
[facility]: Cancer Care                                                                                          Called Anuel to follow up on his plan of care.   PET is scheduled on 3/12/25 and this date is acceptable to Dr. Gaviria. His daughter has been trying to call around to change the date of the scan to an earlier date but advised to keep the Pet as scheduled as we do not want there to be an insurance issue with rescheduling as his insurance does require a 48 hour time prior to auth. Rescheduling could potentially cause a delay. He stated understanding and will also explain to his daughter and they will keep as scheduled.  Shared that some of his labs and final path is still pending at this time/ Dr. Gaviria wanted  writer to relay to Anuel that no matter which direction the pathology results, he will need IV Chemotherapy which will require an implanted port. Shared about what a port is, rationale, that IR team places the port. He understands that once the port order is reviewed, the IR scheduling team will reach out to him to schedule the procedure. He understands that is ok if this is done next week as his chemo will not be until the following week. He understands that he will need a  for his port placement and declines need for emla cream.  He will be scheduled for a follow up with Dr. Gaviria on 3/13/25  check in at 1115 for 1130 follow up apt. Labs, path and PET will all be reviewed at his follow up apt and recommendations for treatment will be made and we will help facilitate next steps.  He stated understanding of all of the above. He will call writer if any questions arise. He understands that writer will be out of the office on 3/7 but available today until 1630 and again on Monday 3/10.  Addendum  Port is scheduled on 3/10/25    Signature:  Patricia Mack RN

## 2025-03-06 NOTE — TELEPHONE ENCOUNTER
Representative from [facility] calls regarding patient. She reports that patient is on the phone inquiring about upcoming PET scan. [facility] has not received anything but she is showing that a PA is not required. Per chart review PET scan was just ordered yesterday. Order will go through billing staff to see if authorization is needed. Representative will inform patient of status.     Leonila Bhagat RN

## 2025-03-07 NOTE — PROGRESS NOTES
Interventional Radiology - Pre-Procedure Chart Review:  [facility]  03/07/2025     Procedure Requested: Port placement  Requested by: Rg Gaviria MD     History and Physical Reviewed: H&P documented within 30 days (by Rg Gaviria MD on 03/05/2025). I have personally reviewed the patient's medical history and have updated the medical record as necessary.    HPI: David Samaniego is a 74 year old male with a PMH of CKD III, HTN, s/p TURP, and a new diagnosis of metastatic poorly differentiated adenocarcinoma who presents for a port placement. Treatment plan includes chemotherapy. No documented plans for radiation. Requesting port placement.    ANTICOAGULANTS/ANTIPLATELETS: None  ANTIBIOTICS: Ancef 2g    ALLERGIES:  No Known Allergies      LABS:  INR   Date Value Ref Range Status   03/05/2025 1.10 0.85 - 1.15 Final      Hemoglobin   Date Value Ref Range Status   03/05/2025 11.6 (L) 13.3 - 17.7 g/dL Final   01/13/2015 15.2 13.5 - 17.5 g/dL      Platelet Count   Date Value Ref Range Status   03/05/2025 322 150 - 450 10e3/uL Final   01/13/2015 279 140 - 440 thou/cu mm      Creatinine   Date Value Ref Range Status   03/05/2025 1.77 (H) 0.67 - 1.17 mg/dL Final   01/25/2021 1.83 (H) 0.70 - 1.30 mg/dL Final     Potassium   Date Value Ref Range Status   03/05/2025 4.0 3.4 - 5.3 mmol/L Final   02/28/2022 4.2 3.5 - 5.1 mmol/L Final   01/25/2021 4.4 3.5 - 5.1 mmol/L Final       ASSESSMENT/PLAN:   Planning for port placement in IR. Radiologist to further review procedure with patient.    EMR reviewed. No formal assessment completed.     Total time spent on the date of the encounter: 20 minutes.      CODI GARZA CNP  Interventional Radiology

## 2025-03-08 LAB — CANCER AG19-9 SERPL IA-ACNC: 87 U/ML

## 2025-03-10 ENCOUNTER — HOSPITAL ENCOUNTER (OUTPATIENT)
Dept: INTERVENTIONAL RADIOLOGY/VASCULAR | Facility: CLINIC | Age: 75
Discharge: HOME | End: 2025-03-10
Attending: INTERNAL MEDICINE | Admitting: STUDENT IN AN ORGANIZED HEALTH CARE EDUCATION/TRAINING PROGRAM
Payer: COMMERCIAL

## 2025-03-10 VITALS
OXYGEN SATURATION: 97 % | SYSTOLIC BLOOD PRESSURE: 147 MMHG | RESPIRATION RATE: 16 BRPM | HEART RATE: 74 BPM | TEMPERATURE: 97.7 F | DIASTOLIC BLOOD PRESSURE: 86 MMHG

## 2025-03-10 DIAGNOSIS — C22.1 INTRAHEPATIC BILE DUCT CARCINOMA (H): ICD-10-CM

## 2025-03-10 PROCEDURE — C1788 PORT, INDWELLING, IMP: HCPCS

## 2025-03-10 PROCEDURE — 250N000011 HC RX IP 250 OP 636

## 2025-03-10 PROCEDURE — 250N000011 HC RX IP 250 OP 636: Performed by: STUDENT IN AN ORGANIZED HEALTH CARE EDUCATION/TRAINING PROGRAM

## 2025-03-10 PROCEDURE — 99152 MOD SED SAME PHYS/QHP 5/>YRS: CPT

## 2025-03-10 PROCEDURE — 250N000009 HC RX 250: Performed by: STUDENT IN AN ORGANIZED HEALTH CARE EDUCATION/TRAINING PROGRAM

## 2025-03-10 PROCEDURE — C1769 GUIDE WIRE: HCPCS

## 2025-03-10 PROCEDURE — 272N000500 HC NEEDLE CR2

## 2025-03-10 RX ORDER — NALOXONE HYDROCHLORIDE 0.4 MG/ML
0.4 INJECTION, SOLUTION INTRAMUSCULAR; INTRAVENOUS; SUBCUTANEOUS
Status: DISCONTINUED | OUTPATIENT
Start: 2025-03-10 | End: 2025-03-11 | Stop reason: HOSPADM

## 2025-03-10 RX ORDER — HEPARIN SODIUM 200 [USP'U]/100ML
1 INJECTION, SOLUTION INTRAVENOUS EVERY 5 MIN PRN
Status: DISCONTINUED | OUTPATIENT
Start: 2025-03-10 | End: 2025-03-11 | Stop reason: HOSPADM

## 2025-03-10 RX ORDER — HEPARIN SODIUM (PORCINE) LOCK FLUSH IV SOLN 100 UNIT/ML 100 UNIT/ML
500 SOLUTION INTRAVENOUS ONCE
Status: COMPLETED | OUTPATIENT
Start: 2025-03-10 | End: 2025-03-10

## 2025-03-10 RX ORDER — ONDANSETRON 2 MG/ML
4 INJECTION INTRAMUSCULAR; INTRAVENOUS
Status: DISCONTINUED | OUTPATIENT
Start: 2025-03-10 | End: 2025-03-11 | Stop reason: HOSPADM

## 2025-03-10 RX ORDER — LIDOCAINE HYDROCHLORIDE AND EPINEPHRINE 10; 10 MG/ML; UG/ML
10 INJECTION, SOLUTION INFILTRATION; PERINEURAL ONCE
Status: COMPLETED | OUTPATIENT
Start: 2025-03-10 | End: 2025-03-10

## 2025-03-10 RX ORDER — FLUMAZENIL 0.1 MG/ML
0.2 INJECTION, SOLUTION INTRAVENOUS
Status: DISCONTINUED | OUTPATIENT
Start: 2025-03-10 | End: 2025-03-11 | Stop reason: HOSPADM

## 2025-03-10 RX ORDER — FENTANYL CITRATE 50 UG/ML
25-50 INJECTION, SOLUTION INTRAMUSCULAR; INTRAVENOUS EVERY 5 MIN PRN
Status: DISCONTINUED | OUTPATIENT
Start: 2025-03-10 | End: 2025-03-11 | Stop reason: HOSPADM

## 2025-03-10 RX ORDER — NALOXONE HYDROCHLORIDE 0.4 MG/ML
0.2 INJECTION, SOLUTION INTRAMUSCULAR; INTRAVENOUS; SUBCUTANEOUS
Status: DISCONTINUED | OUTPATIENT
Start: 2025-03-10 | End: 2025-03-11 | Stop reason: HOSPADM

## 2025-03-10 RX ORDER — LIDOCAINE 40 MG/G
CREAM TOPICAL
Status: DISCONTINUED | OUTPATIENT
Start: 2025-03-10 | End: 2025-03-11 | Stop reason: HOSPADM

## 2025-03-10 RX ORDER — CEFAZOLIN SODIUM/WATER 2 G/20 ML
2 SYRINGE (ML) INTRAVENOUS
Status: COMPLETED | OUTPATIENT
Start: 2025-03-10 | End: 2025-03-10

## 2025-03-10 RX ADMIN — FENTANYL CITRATE 50 MCG: 50 INJECTION INTRAMUSCULAR; INTRAVENOUS at 14:58

## 2025-03-10 RX ADMIN — LIDOCAINE HYDROCHLORIDE,EPINEPHRINE BITARTRATE 10 ML: 10; .01 INJECTION, SOLUTION INFILTRATION; PERINEURAL at 15:08

## 2025-03-10 RX ADMIN — MIDAZOLAM HYDROCHLORIDE 1 MG: 1 INJECTION, SOLUTION INTRAMUSCULAR; INTRAVENOUS at 14:58

## 2025-03-10 RX ADMIN — LIDOCAINE HYDROCHLORIDE 10 ML: 10 INJECTION, SOLUTION INFILTRATION; PERINEURAL at 15:04

## 2025-03-10 RX ADMIN — FENTANYL CITRATE 25 MCG: 50 INJECTION INTRAMUSCULAR; INTRAVENOUS at 15:07

## 2025-03-10 RX ADMIN — HEPARIN SODIUM (PORCINE) LOCK FLUSH IV SOLN 100 UNIT/ML 500 UNITS: 100 SOLUTION at 15:11

## 2025-03-10 RX ADMIN — MIDAZOLAM HYDROCHLORIDE 0.5 MG: 1 INJECTION, SOLUTION INTRAMUSCULAR; INTRAVENOUS at 15:07

## 2025-03-10 RX ADMIN — Medication 2 G: at 14:24

## 2025-03-10 NOTE — IR NOTE
Patient Name: David Samaniego  Medical Record Number: [id number]  Today's Date: 3/10/2025    Procedure: IR right chest port placement  Proceduralist: Dr. Ramirez    Procedure Start: 1457  Procedure end: 1512  Sedation medications administered: 1.5 mg midazolam and 75 mcg fentanyl   Sedation time: 15 minutes    Other Notes: Pt arrived to IR room 1 from Pre/post bay 2. Consent reviewed. Pt denies any questions or concerns regarding procedure. Pt positioned supine and monitored per protocol. Pt tolerated procedure without any noted complications. VSS on RA. Pt transferred back to Pre/post bay 2.    [employer] SmartPort Port placed, lot #[id number], exp. 10/31/2027.    Discharge criteria met. Discharge instructions given and reviewed with patient and spouse. No further questions or concerns. Pt brought to hospital entrance via wheelchair and d/c'd home with spouse.

## 2025-03-10 NOTE — DISCHARGE INSTRUCTIONS
Port Placement Procedure Discharge Instructions:  You had a port placed. A port is a small medical device that is placed under the skin and is connected to a vein with a catheter (thin, flexible tube). Ports can be used to administer IV medications (including chemotherapy), fluids or blood products or for blood lab draws. Please follow the below instructions after your procedure:    Care Instructions:  - If you received sedation for your procedure, do not drive or operate heavy machinery for the rest of the day.  - You may shower beginning tomorrow (post procedure day #1). Do not scrub site until well healed, pat dry gently with a towel.  - You likely have skin adhesive over your port site. Skin adhesive works like a bandage to keep the site covered and protected. Do not use antibiotic ointment or creams/lotions over adhesive as it can break it down. The skin adhesive will peel off on its own (typically in 5-14 days).  - Avoid submerging the port site under water (ex: tub baths, lakes, hot tubs and pools) for 10 days or until your site is well healed.  - You may have some discomfort, minimal swelling, redness and/or bruising at your port site/procedure site. You may take over the counter pain medication for discomfort (follow the package directions) or apply an ice pack wrapped in a towel over the site (rotating 20 minutes with ice pack on and 20 minutes with ice pack off) for comfort as needed. It can take several days for these to resolve.  - Avoid heavy lifting (greater than 10 pounds) and strenuous activities for 2-3 days following your procedure.  - If you experience significant bleeding at site, apply pressure with hands above the clavicle bone, sit upright and seek immediate medical assistance.  - Ports need to be flushed approximately every 4 weeks, if not being used more frequently. Follow up with the provider who ordered your port placement for further instructions for this.    If you experience the  following seek medical evaluation:  - Uncontrolled bleeding from port site  - Fever (greater than 100 )  - Purulent (yellow/green/foul smelling) drainage from port insertion site  - Increasing pain at port site  - Increasing redness at port site  - Increasing swelling    INTERVENTIONAL RADIOLOGY DEPARTMENT  Procedure Physician: Dr. Ramirez      Date of procedure: 3/10/25    [place] [facility] RN Line: [phone]     IF YOU ARE EXPERIENCING A MEDICAL EMERGENCY PLEASE CALL 911             * Recovery After Conscious Sedation (Adult)  We gave you medicine by vein to make you sleepy or relaxed during your procedure. This may have included both a pain medicine and sleeping medicine. Most of the effects have worn off. But you may still feel sleepy for the next 6 to 8 hours.  Home care  Follow these guidelines when you get home:  You may feel sleepy and clumsy and have poor balance for the next few hours.  A responsible adult should stay with you for the next 8 hours. This person should make sure your condition doesn t get worse.  Don't drink any alcohol for the next 24 hours.  Don't drive, operate dangerous machinery, make important business or personal decisions or sign legal documents during the next 24 hours.  You may vomit (throw up) if you eat too soon after the procedure. If this happens, drink small amounts of water, juice or clear broth. Wait to try solid food until you no longer have nausea (upset stomach).  Note: Your care team may tell you not to take any medicine by mouth for pain or sleep in the next 4 hours. These medicines may react with the medicines you had in the hospital. This could cause a much stronger response than usual.  Follow-up care  Follow up with your care team if you are not alert and back to your usual level of activity within 12 hours.  When to seek medical advice  Call your care team right away if any of these occur:  You still feel sleepy or clumsy after 12 hours, or your sleepiness gets  worse  Weakness or dizziness gets worse  Repeated vomiting  If you can't be woken up and someone is staying with you, they should call 911.  For informational purposes only. Not to replace the advice of your health care provider.  Copyright   2018 [employer]. All rights reserved.

## 2025-03-11 ENCOUNTER — PRE VISIT (OUTPATIENT)
Dept: GASTROENTEROLOGY | Facility: CLINIC | Age: 75
End: 2025-03-11

## 2025-03-11 LAB
PATH REPORT.ADDENDUM SPEC: ABNORMAL
PATH REPORT.COMMENTS IMP SPEC: ABNORMAL
PATH REPORT.COMMENTS IMP SPEC: YES
PATH REPORT.FINAL DX SPEC: ABNORMAL
PATH REPORT.GROSS SPEC: ABNORMAL
PATH REPORT.MICROSCOPIC SPEC OTHER STN: ABNORMAL
PATH REPORT.MICROSCOPIC SPEC OTHER STN: ABNORMAL
PATH REPORT.RELEVANT HX SPEC: ABNORMAL
PHOTO IMAGE: ABNORMAL

## 2025-03-12 ENCOUNTER — HOSPITAL ENCOUNTER (OUTPATIENT)
Dept: PET IMAGING | Facility: HOSPITAL | Age: 75
Discharge: HOME | End: 2025-03-12
Attending: INTERNAL MEDICINE
Payer: COMMERCIAL

## 2025-03-12 ENCOUNTER — PATIENT OUTREACH (OUTPATIENT)
Dept: ONCOLOGY | Facility: HOSPITAL | Age: 75
End: 2025-03-12
Payer: COMMERCIAL

## 2025-03-12 DIAGNOSIS — C77.2 ADENOCARCINOMA METASTATIC TO INTRA-ABDOMINAL LYMPH NODE (H): ICD-10-CM

## 2025-03-12 DIAGNOSIS — R16.0 LIVER MASS: ICD-10-CM

## 2025-03-12 DIAGNOSIS — E83.52 HYPERCALCEMIA: ICD-10-CM

## 2025-03-12 LAB
CREAT BLD-MCNC: 1.7 MG/DL (ref 0.7–1.2)
EGFRCR SERPLBLD CKD-EPI 2021: 41 ML/MIN/1.73M2
GLUCOSE BLDC GLUCOMTR-MCNC: 76 MG/DL (ref 70–99)

## 2025-03-12 PROCEDURE — 71260 CT THORAX DX C+: CPT

## 2025-03-12 PROCEDURE — 82962 GLUCOSE BLOOD TEST: CPT

## 2025-03-12 PROCEDURE — 82565 ASSAY OF CREATININE: CPT

## 2025-03-12 PROCEDURE — 343N000001 HC RX 343 MED OP 636: Performed by: INTERNAL MEDICINE

## 2025-03-12 PROCEDURE — 250N000011 HC RX IP 250 OP 636: Performed by: INTERNAL MEDICINE

## 2025-03-12 PROCEDURE — 78815 PET IMAGE W/CT SKULL-THIGH: CPT | Mod: PI

## 2025-03-12 PROCEDURE — A9552 F18 FDG: HCPCS | Performed by: INTERNAL MEDICINE

## 2025-03-12 RX ORDER — FLUDEOXYGLUCOSE F 18 200 MCI/ML
7-18 INJECTION, SOLUTION INTRAVENOUS ONCE
Status: COMPLETED | OUTPATIENT
Start: 2025-03-12 | End: 2025-03-12

## 2025-03-12 RX ORDER — IOPAMIDOL 755 MG/ML
100 INJECTION, SOLUTION INTRAVASCULAR ONCE
Status: COMPLETED | OUTPATIENT
Start: 2025-03-12 | End: 2025-03-12

## 2025-03-12 RX ADMIN — IOPAMIDOL 100 ML: 755 INJECTION, SOLUTION INTRAVENOUS at 14:24

## 2025-03-12 RX ADMIN — FLUDEOXYGLUCOSE F 18 12.9 MILLICURIE: 200 INJECTION, SOLUTION INTRAVENOUS at 13:22

## 2025-03-12 NOTE — PROGRESS NOTES
[facility]: Cancer Care                                                                                          Called Anuel, per the request of Dr. Gaviria to relay update regarding pathology. Shared that the pathology has been sent to [facility] for additional testing and will not be back until next Tuesday, Our pathologist at  has asked for an expedited read on the pathology. Dr. Gaviria recommends that we reschedule his follow up to next week so that we have the final path available for review and to make treatment recommendations. He should keep his apt for his PET scan today.  Of note, [facility] testing will be submitted as well.    Signature:  Patricia Mack RN

## 2025-03-13 ENCOUNTER — PATIENT OUTREACH (OUTPATIENT)
Dept: ONCOLOGY | Facility: HOSPITAL | Age: 75
End: 2025-03-13
Payer: COMMERCIAL

## 2025-03-13 NOTE — PROGRESS NOTES
[facility]: Cancer Care                                                                                          0830 Per the request of Dr. Gaviria, [facility] CDX was submitted for Specimen [id number] . Order confirmed with order number , [id number].  Will fax final path report from [place] when finalized.  Medical Necessity form was completed and signed by MD, faxed to [facility] on 3.13, fax received per fax right at 1020 on 3/13/25    Signature:  Patricia Mack RN   Troponin on admission 0.071. EKG only notable for prior infarct. No chest pain or SOB. Likely demand ischemia.    Plan:  Trops peaked at 0.096 then downtrended. Resolved.

## 2025-03-17 ENCOUNTER — PATIENT OUTREACH (OUTPATIENT)
Dept: ONCOLOGY | Facility: HOSPITAL | Age: 75
End: 2025-03-17
Payer: COMMERCIAL

## 2025-03-17 NOTE — PROGRESS NOTES
[facility]: Cancer Care                                                                                          1507 Called Anuel to follow up with update on pathology. Spoke with our local pathologist at  and he has communicated with the pathology team at [place]. It is a complex case and they are in the midst of performing more stains on the pathology. The results will not be available by his apt tomorrow and likely not for a few more days. We will need to reschedule his apt to early next week so that the pathology is available to review at his follow up as treatment planning is dependent on the results. He stated understanding. Apt was rescheduled to 3/24, check in at 100. Writer will call if there are any further delays.  1514   Alec, daughter of Anuel called writer to  inquire about the delay so she could better understand. Reviewed the above. She stated understanding. Support and encouragement provided as waiting for results can be difficult.    Signature:  Patricia Mack RN

## 2025-03-20 LAB
PATH REPORT.ADDENDUM SPEC: ABNORMAL
PATH REPORT.ADDENDUM SPEC: ABNORMAL
PATH REPORT.COMMENTS IMP SPEC: ABNORMAL
PATH REPORT.COMMENTS IMP SPEC: YES
PATH REPORT.FINAL DX SPEC: ABNORMAL
PATH REPORT.GROSS SPEC: ABNORMAL
PATH REPORT.MICROSCOPIC SPEC OTHER STN: ABNORMAL
PATH REPORT.MICROSCOPIC SPEC OTHER STN: ABNORMAL
PATH REPORT.RELEVANT HX SPEC: ABNORMAL
PHOTO IMAGE: ABNORMAL

## 2025-03-24 ENCOUNTER — ONCOLOGY VISIT (OUTPATIENT)
Dept: ONCOLOGY | Facility: HOSPITAL | Age: 75
End: 2025-03-24
Attending: INTERNAL MEDICINE
Payer: COMMERCIAL

## 2025-03-24 ENCOUNTER — PATIENT OUTREACH (OUTPATIENT)
Dept: ONCOLOGY | Facility: HOSPITAL | Age: 75
End: 2025-03-24

## 2025-03-24 ENCOUNTER — LAB (OUTPATIENT)
Dept: INFUSION THERAPY | Facility: HOSPITAL | Age: 75
End: 2025-03-24
Attending: INTERNAL MEDICINE
Payer: COMMERCIAL

## 2025-03-24 VITALS
HEIGHT: 76 IN | WEIGHT: 210 LBS | HEART RATE: 79 BPM | TEMPERATURE: 96.8 F | BODY MASS INDEX: 25.57 KG/M2 | OXYGEN SATURATION: 100 % | DIASTOLIC BLOOD PRESSURE: 80 MMHG | SYSTOLIC BLOOD PRESSURE: 130 MMHG | RESPIRATION RATE: 16 BRPM

## 2025-03-24 DIAGNOSIS — C22.0 HCC (HEPATOCELLULAR CARCINOMA) (H): ICD-10-CM

## 2025-03-24 DIAGNOSIS — Z91.89 ENCOUNTER FOR HCV SCREENING TEST FOR HIGH RISK PATIENT: ICD-10-CM

## 2025-03-24 DIAGNOSIS — C22.1 CHOLANGIOCARCINOMA (H): Primary | ICD-10-CM

## 2025-03-24 DIAGNOSIS — Z11.59 NEED FOR HEPATITIS B SCREENING TEST: ICD-10-CM

## 2025-03-24 DIAGNOSIS — C22.1 CHOLANGIOCARCINOMA (H): ICD-10-CM

## 2025-03-24 DIAGNOSIS — Z11.59 ENCOUNTER FOR HCV SCREENING TEST FOR HIGH RISK PATIENT: ICD-10-CM

## 2025-03-24 LAB
ALBUMIN SERPL BCG-MCNC: 3.5 G/DL (ref 3.5–5.2)
ALP SERPL-CCNC: 514 U/L (ref 40–150)
ALT SERPL W P-5'-P-CCNC: 28 U/L (ref 0–70)
ANION GAP SERPL CALCULATED.3IONS-SCNC: 11 MMOL/L (ref 7–15)
AST SERPL W P-5'-P-CCNC: 44 U/L (ref 0–45)
BASOPHILS # BLD AUTO: 0 10E3/UL (ref 0–0.2)
BASOPHILS NFR BLD AUTO: 0 %
BILIRUB SERPL-MCNC: 0.6 MG/DL
BUN SERPL-MCNC: 26.4 MG/DL (ref 8–23)
CALCIUM SERPL-MCNC: 12.2 MG/DL (ref 8.8–10.4)
CHLORIDE SERPL-SCNC: 103 MMOL/L (ref 98–107)
CREAT SERPL-MCNC: 1.64 MG/DL (ref 0.67–1.17)
EGFRCR SERPLBLD CKD-EPI 2021: 43 ML/MIN/1.73M2
EOSINOPHIL # BLD AUTO: 0.1 10E3/UL (ref 0–0.7)
EOSINOPHIL NFR BLD AUTO: 1 %
ERYTHROCYTE [DISTWIDTH] IN BLOOD BY AUTOMATED COUNT: 13.5 % (ref 10–15)
GLUCOSE SERPL-MCNC: 100 MG/DL (ref 70–99)
HCO3 SERPL-SCNC: 24 MMOL/L (ref 22–29)
HCT VFR BLD AUTO: 33.2 % (ref 40–53)
HGB BLD-MCNC: 11 G/DL (ref 13.3–17.7)
IMM GRANULOCYTES # BLD: 0.1 10E3/UL
IMM GRANULOCYTES NFR BLD: 1 %
LDH SERPL L TO P-CCNC: 215 U/L (ref 0–250)
LYMPHOCYTES # BLD AUTO: 0.7 10E3/UL (ref 0.8–5.3)
LYMPHOCYTES NFR BLD AUTO: 6 %
MCH RBC QN AUTO: 28.7 PG (ref 26.5–33)
MCHC RBC AUTO-ENTMCNC: 33.1 G/DL (ref 31.5–36.5)
MCV RBC AUTO: 87 FL (ref 78–100)
MONOCYTES # BLD AUTO: 0.8 10E3/UL (ref 0–1.3)
MONOCYTES NFR BLD AUTO: 8 %
NEUTROPHILS # BLD AUTO: 9 10E3/UL (ref 1.6–8.3)
NEUTROPHILS NFR BLD AUTO: 84 %
NRBC # BLD AUTO: 0 10E3/UL
NRBC BLD AUTO-RTO: 0 /100
PLATELET # BLD AUTO: 345 10E3/UL (ref 150–450)
POTASSIUM SERPL-SCNC: 4.1 MMOL/L (ref 3.4–5.3)
PROT SERPL-MCNC: 7.7 G/DL (ref 6.4–8.3)
RBC # BLD AUTO: 3.83 10E6/UL (ref 4.4–5.9)
SODIUM SERPL-SCNC: 138 MMOL/L (ref 135–145)
WBC # BLD AUTO: 10.8 10E3/UL (ref 4–11)

## 2025-03-24 PROCEDURE — G0463 HOSPITAL OUTPT CLINIC VISIT: HCPCS | Performed by: INTERNAL MEDICINE

## 2025-03-24 PROCEDURE — 86704 HEP B CORE ANTIBODY TOTAL: CPT

## 2025-03-24 PROCEDURE — 86301 IMMUNOASSAY TUMOR CA 19-9: CPT

## 2025-03-24 PROCEDURE — G2211 COMPLEX E/M VISIT ADD ON: HCPCS | Performed by: INTERNAL MEDICINE

## 2025-03-24 PROCEDURE — 87340 HEPATITIS B SURFACE AG IA: CPT

## 2025-03-24 PROCEDURE — 99215 OFFICE O/P EST HI 40 MIN: CPT | Performed by: INTERNAL MEDICINE

## 2025-03-24 PROCEDURE — 85025 COMPLETE CBC W/AUTO DIFF WBC: CPT

## 2025-03-24 PROCEDURE — 86803 HEPATITIS C AB TEST: CPT

## 2025-03-24 PROCEDURE — 36415 COLL VENOUS BLD VENIPUNCTURE: CPT

## 2025-03-24 PROCEDURE — 80053 COMPREHEN METABOLIC PANEL: CPT

## 2025-03-24 PROCEDURE — 83615 LACTATE (LD) (LDH) ENZYME: CPT

## 2025-03-24 RX ORDER — PROCHLORPERAZINE MALEATE 10 MG
10 TABLET ORAL EVERY 6 HOURS PRN
Qty: 30 TABLET | Refills: 2 | Status: SHIPPED | OUTPATIENT
Start: 2025-03-24 | End: 2025-03-24

## 2025-03-24 RX ORDER — DEXAMETHASONE 4 MG/1
8 TABLET ORAL DAILY
Qty: 12 TABLET | Refills: 7 | Status: SHIPPED | OUTPATIENT
Start: 2025-03-24

## 2025-03-24 RX ORDER — DEXAMETHASONE 4 MG/1
8 TABLET ORAL DAILY
Qty: 12 TABLET | Refills: 7 | Status: SHIPPED | OUTPATIENT
Start: 2025-03-24 | End: 2025-03-24

## 2025-03-24 RX ORDER — PROCHLORPERAZINE MALEATE 10 MG
10 TABLET ORAL EVERY 6 HOURS PRN
Qty: 30 TABLET | Refills: 2 | Status: SHIPPED | OUTPATIENT
Start: 2025-03-24

## 2025-03-24 RX ORDER — ONDANSETRON 8 MG/1
8 TABLET, FILM COATED ORAL EVERY 8 HOURS PRN
Qty: 30 TABLET | Refills: 1 | Status: SHIPPED | OUTPATIENT
Start: 2025-03-24

## 2025-03-24 RX ORDER — ONDANSETRON 8 MG/1
8 TABLET, FILM COATED ORAL EVERY 8 HOURS PRN
Qty: 30 TABLET | Refills: 2 | Status: SHIPPED | OUTPATIENT
Start: 2025-03-26 | End: 2025-03-24

## 2025-03-24 ASSESSMENT — PAIN SCALES - GENERAL: PAINLEVEL_OUTOF10: SEVERE PAIN (8)

## 2025-03-24 NOTE — PROGRESS NOTES
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        History of Present Illness    Patient is here in follow-up.  He is here to discuss his final pathology report and decide on a treatment plan.  His pathology was referred for second opinion to [facility] as the cell of origin was not completely clear.  It was consistent with a poorly differentiated carcinoma  with an immunophenotype that was nonspecific however the predominant morphologic and immunophenotypic features are more suggestive of a cholangiocarcinoma.  Patient reports that he is starting to have symptoms.  He has lost his appetite and is losing weight he also complains of feelings of nausea.  He denies any significant pain but does complains of weakness.  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     No Comments Provided    Enlarged prostate without lower urinary tract symptoms (luts)     No Comments Provided    Essential (primary) hypertension     No Comments Provided    Gastro-esophageal reflux disease without esophagitis     No Comments Provided    Hypothyroidism     No Comments Provided    Other specified congenital malformations of intestine     No Comments  "Provided    PONV (postoperative nausea and vomiting)     with surgeries in the 80's       Past Surgical History:   Procedure Laterality Date    BACK SURGERY      1986 and 1987    COLONOSCOPY      7/29/05,next due 2015    COLONOSCOPY  10/26/2015    Tubular adenoma,F/U 2020    COLONOSCOPY N/A 11/16/2020    F/U 2025 tubular adenomas    CYSTOSCOPY, TRANSURETHRAL RESECTION (TUR) PROSTATE, COMBINED N/A 07/30/2019    Procedure: CYSTOSCOPY, WITH TRANSURETHRAL RESECTION (TUR) PROSTATE;  Surgeon: Art Liu MD;  Location: [facility] OR    IR CHEST PORT PLACEMENT > 5 YRS OF AGE  3/10/2025    OTHER SURGICAL HISTORY      [id number],PARTIAL THYROIDECTOMY    OTHER SURGICAL HISTORY      [id number],ANESTHESIA ALERT,Notes no prior complications from anesthesia.:    [facility] TUR PROSTATE BIOPSIES         Physical Exam    /80 (BP Location: Left arm, Patient Position: Sitting, Cuff Size: Adult Regular)   Pulse 79   Temp 96.8  F (36  C) (Tympanic)   Resp 16   Ht 1.943 m (6' 4.5\")   Wt 95.3 kg (210 lb)   SpO2 100%   BMI 25.23 kg/m      General: alert, awake, not in acute distress  HEENT: Head: Normal, normocephalic, atraumatic.  Eye: Normal external eye, conjunctiva, lids cornea, DARY.  Nose: Normal external nose, mucus membranes and septum.  Pharynx: Normal buccal mucosa. Normal pharynx.  Neck / Thyroid: Supple, no masses, nodes, nodules or enlargement.  Lymphatics: No abnormally enlarged lymph nodes.  Chest: Normal chest wall and respirations. Clear to auscultation.  No crackles or rhonchi's  Heart: S1 S2 RRR, no murmur.   Abdomen: abdomen is soft without significant tenderness, masses, organomegaly or guarding  Extremities: normal strength, tone, and muscle mass  Skin: normal. no rash or abnormalities  CNS: non focal.    Lab Results    No results found for this or any previous visit (from the past 240 hours).      Imaging    PET Oncology (Eyes to Thighs)    Result Date: 3/12/2025  EXAM: PET ONCOLOGY (EYES TO THIGHS), CT CHEST W CONTRAST " LOCATION: [facility] DATE: 3/12/2025 INDICATION: Abnormal findings on diagnostic imaging of liver and biliary tract. Carcinoma of unknown origin liver biopsy. COMPARISON: CT abdomen pelvis 2/14/2025. CONTRAST: 100 mL Isovue-370, IV. TECHNIQUE: Serum glucose level 76 mg/dL. One hour post intravenous administration of 12.9mCi F18DG, PET imaging was performed from the skull vertex to mid thighs, utilizing attenuation correction with concurrent axial CT and PET/CT image fusion. Separate  diagnostic CT of the chest was performed. Dose reduction techniques were used. PET/CT FINDINGS: Large heterogeneous FDG avid liver metastases which measures 13.1 x 12.9 x 23.9 cm (SUV max 45.1) with several smaller FDG avid satellite lesions throughout the remaining liver parenchyma, suspicious for neoplasm. Associated FDG avid upper abdominal lymph nodes including gastrohepatic (SUV max 9.0), portacaval (SUV max 17.4) and retroperitoneal (SUV max 3.3), suspicious for zulema metastatic disease. FDG avid osseous lesion in the right iliac crest measuring 3.5 x 2.5 cm (SUV max 31.8) and subcentimeter lesion in the medial left clavicle (SUV max 3.5), suspicious for sites of osseous metastatic disease. CT FINDINGS: Mild senescent intracranial changes. Right chest wall port with tip in mid SVC. Mild to moderate coronary artery calcification. Few strands of scar/atelectasis in the lungs. There are few tiny pulmonary nodules (for example the right upper lobe measuring  3 mm series 8 image 31, periphery of the right upper lobe measuring 3 mm series 8 image 53, right lower lobe nodule measuring 6 mm series 8 image 72, and right lower lobe measuring 4 mm series 8 image 77), which are too small to characterize by PET/CT. Previously described cardiophrenic lymph nodes are not FDG avid above background levels. Intrahepatic biliary dilatation and narrowing of the portal vein adjacent to the dominant liver lesion. Right renal  cyst. Additional scattered atherosclerotic calcifications. Small volume ascites. Enlarged prostate. Pelvic phleboliths. Multilevel degenerative changes of the spine.     IMPRESSION: 1.  FDG avid large dominant liver mass, additional satellite nodules in the liver, upper abdominal and retroperitoneal lymph nodes, and osseous lesions in the right iliac crest and left clavicle, suspicious for active neoplasm.. 2.  There are few tiny pulmonary nodules which are too small to characterize by PET/CT. Recommend continued attention on follow-up.     CT Chest w Contrast    Result Date: 3/12/2025  EXAM: PET ONCOLOGY (EYES TO THIGHS), CT CHEST W CONTRAST LOCATION: [facility] DATE: 3/12/2025 INDICATION: Abnormal findings on diagnostic imaging of liver and biliary tract. Carcinoma of unknown origin liver biopsy. COMPARISON: CT abdomen pelvis 2/14/2025. CONTRAST: 100 mL Isovue-370, IV. TECHNIQUE: Serum glucose level 76 mg/dL. One hour post intravenous administration of 12.9mCi F18DG, PET imaging was performed from the skull vertex to mid thighs, utilizing attenuation correction with concurrent axial CT and PET/CT image fusion. Separate  diagnostic CT of the chest was performed. Dose reduction techniques were used. PET/CT FINDINGS: Large heterogeneous FDG avid liver metastases which measures 13.1 x 12.9 x 23.9 cm (SUV max 45.1) with several smaller FDG avid satellite lesions throughout the remaining liver parenchyma, suspicious for neoplasm. Associated FDG avid upper abdominal lymph nodes including gastrohepatic (SUV max 9.0), portacaval (SUV max 17.4) and retroperitoneal (SUV max 3.3), suspicious for zulema metastatic disease. FDG avid osseous lesion in the right iliac crest measuring 3.5 x 2.5 cm (SUV max 31.8) and subcentimeter lesion in the medial left clavicle (SUV max 3.5), suspicious for sites of osseous metastatic disease. CT FINDINGS: Mild senescent intracranial changes. Right chest wall port with  tip in mid SVC. Mild to moderate coronary artery calcification. Few strands of scar/atelectasis in the lungs. There are few tiny pulmonary nodules (for example the right upper lobe measuring  3 mm series 8 image 31, periphery of the right upper lobe measuring 3 mm series 8 image 53, right lower lobe nodule measuring 6 mm series 8 image 72, and right lower lobe measuring 4 mm series 8 image 77), which are too small to characterize by PET/CT. Previously described cardiophrenic lymph nodes are not FDG avid above background levels. Intrahepatic biliary dilatation and narrowing of the portal vein adjacent to the dominant liver lesion. Right renal cyst. Additional scattered atherosclerotic calcifications. Small volume ascites. Enlarged prostate. Pelvic phleboliths. Multilevel degenerative changes of the spine.     IMPRESSION: 1.  FDG avid large dominant liver mass, additional satellite nodules in the liver, upper abdominal and retroperitoneal lymph nodes, and osseous lesions in the right iliac crest and left clavicle, suspicious for active neoplasm.. 2.  There are few tiny pulmonary nodules which are too small to characterize by PET/CT. Recommend continued attention on follow-up.     IR Chest Port Placement > 5 Yrs of Age    Result Date: 3/10/2025  [place] RADIOLOGY LOCATION: [facility] DATE: 3/10/2025 PROCEDURE: IMPLANTABLE VENOUS CHEST PORT PLACEMENT (POWER INJECTABLE) INTERVENTIONAL RADIOLOGIST: Harinder Ramirez MD. INDICATION: Bile duct carcinoma. Chest port placement for initiation of therapy. CONSENT: The risks, benefits and alternatives of implantable venous chest port placement were discussed with the patient in detail. All questions were answered. Informed consent was given to proceed with the procedure. MODERATE SEDATION: Versed 1.5 mg IV; Fentanyl 75 mcg IV.  During the timeout, immediately prior to the administration of medications, the patient was reassessed for adequacy to receive  conscious sedation. Under physician supervision, Versed and fentanyl were administered for moderate sedation. Pulse oximetry, heart rate and blood pressure were continuously monitored by an independent trained observer. The physician spent 15 minutes of face-to-face sedation time with the patient. CONTRAST: None. ANTIBIOTICS: Ancef 2 g IV. ADDITIONAL MEDICATIONS: None. FLUOROSCOPIC TIME: 0.9 minutes. RADIATION DOSE: Air Kerma: 6 mGy. COMPLICATIONS: No immediate complications. STERILE BARRIER TECHNIQUE: Maximum sterile barrier technique was used. Cutaneous antisepsis was performed at the operative site with application of 2% chlorhexidine and large sterile drape. Prior to the procedure, the  and assistant performed hand hygiene and wore hat, mask, sterile gown, and sterile gloves during the entire procedure. PROCEDURE:  Using real-time ultrasound guidance the right internal jugular vein was accessed. A subcutaneous pocket was created and irrigated with sterile normal saline. The catheter tubing was tunneled in an antegrade fashion from the port pocket to the dermatotomy site. Over a guidewire, and under direct fluoroscopic visualization a peel-away sheath was advanced over the wire. Through the peel-away sheath, the catheter tubing was advanced until the tip was at the cavoatrial junction. The catheter tubing was cut to length and attached firmly to the port. The port was placed within the subcutaneous pocket and tested. The port pocket incision was closed with layered absorbable suture and surgical glue. The dermatotomy site was closed with surgical glue. FINDINGS: Ultrasound demonstrates an anechoic and compressible jugular vein. A permanent image was stored. At the completion of the study the port tip lies near the cavoatrial junction.     IMPRESSION:  1.  Successful implantable venous chest port placement as detailed above. 2.  The implantable venous chest port was placed under fluoroscopic guidance and  is ready for use immediately.     US Biopsy Liver    Result Date: 2/26/2025  EXAM: 1. PERCUTANEOUS BIOPSY LIVER 2. ULTRASOUND GUIDANCE 3. CONSCIOUS SEDATION LOCATION: [facility] DATE: 2/26/2025 INDICATION: Infiltrative hepatic mass and smaller satellite lesions, suspected primary liver biliary malignancy. PROCEDURE: Informed consent obtained. Site marked. Prior images reviewed. Required items made available. Patient identity was confirmed verbally and with arm band. Patient reevaluated immediately before administering sedation. Universal protocol was followed. Time out performed. The site was prepped and draped in sterile fashion. 10 mL of 1 percent lidocaine was infused into the local soft tissues. Using standard technique and under direct ultrasound guidance, a 18 gauge biopsy needle was used to make 5 core biopsies. Tissue was submitted to Pathology and adequate by preliminary review. The patient tolerated the procedure well. No complications. SEDATION: Versed 2 mg. Fentanyl 100 mcg. The procedure was performed with administration intravenous conscious sedation with appropriate preoperative, intraoperative, and postoperative evaluation. 33 minutes of supervised face to face conscious sedation time was provided by a radiology nurse under my direct supervision.     IMPRESSION: Status post US-guided biopsy of hepatic mass. Reference CPT Code: 72595, 71842, 93201, 03156         Assessment and Plan    Metastatic intrahepatic cholangiocarcinoma    Patient is here in follow-up and is accompanied by his wife Since I last saw him he had a PET scan done and his pathology report has been finalized.  It took longer than 6 and 2 of his kids.  It was sent for [facility] and they took more than expected time.  After doing an extensive immunophenotypic workup is still not definitive but the immunophenotypic profile and the morphologic features are more in line with a cholangiocarcinoma.  The radiologic  features are also more consistent with that as I had discussed and reviewed his films in detail with our radiologist.  This was explained to the patient and the family and explained to them that this is incurable in nature and all treatment will be difficult.  I will like to treat him with a combination of gemcitabine cisplatin and durvalumab.  His kidney function however is borderline and I will repeat the kidney function and make a final decision on the regimen based on that.  Once we start him on treatment I would like to restage after 2-3 cycles.    Patient's creatinine today was 1.64 with a GFR of 43.  This was discussed with pharmacy based on his borderline kidney function I would decrease the dose of cisplatin by 25% as recommended.  I do not want to substituted with carboplatin as efficacy data with carboplatin is not as robust in this particular pathology.  We will closely follow kidney function.    Anorexia and nausea  Patient is having a hard time eating and is losing weight.  He also complains of nausea.  I will plan to give him Zofran to take up to 3 times a day as needed to control the nausea.  I will also recommend using high-calorie shakes but he gets nauseous with them.  I will have one of our dietitian reach out to the patient so we can help him come up with a diet plan to maximize his caloric intake    Signed by: Aylin Gaviria MD        CC: Derrick Mckeon MD

## 2025-03-24 NOTE — PROGRESS NOTES
"Oncology Rooming Note    March 24, 2025 1:13 PM   David Samaniego is a 74 year old male who presents for:    Chief Complaint   Patient presents with    Oncology Clinic Visit     Cholangiocarcinoma      Initial Vitals: /80 (BP Location: Left arm, Patient Position: Sitting, Cuff Size: Adult Regular)   Pulse 79   Temp 96.8  F (36  C) (Tympanic)   Resp 16   Ht 1.943 m (6' 4.5\")   Wt 95.3 kg (210 lb)   SpO2 100%   BMI 25.23 kg/m   Estimated body mass index is 25.23 kg/m  as calculated from the following:    Height as of this encounter: 1.943 m (6' 4.5\").    Weight as of this encounter: 95.3 kg (210 lb). Body surface area is 2.27 meters squared.  Severe Pain (8) Comment: Data Unavailable   No LMP for male patient.  Allergies reviewed: Yes  Medications reviewed: Yes    Medications: Medication refills not needed today.  Pharm[facility] into [facility] [address]    Frailty Screening:   Is the patient here for a new oncology consult visit in cancer care? 2. No    PHQ9:  Did this patient require a PHQ9?: No      Clinical concerns: Review imaging      Manda Caceres MA            "

## 2025-03-24 NOTE — LETTER
"3/24/2025      David Samaniego  [id number] [place] [id number]      Dear Colleague,    Thank you for referring your patient, David Samaniego, to the [facility] [place]. Please see a copy of my visit note below.    Oncology Rooming Note    March 24, 2025 1:13 PM   David Samaniego is a 74 year old male who presents for:    Chief Complaint   Patient presents with     Oncology Clinic Visit     Cholangiocarcinoma      Initial Vitals: /80 (BP Location: Left arm, Patient Position: Sitting, Cuff Size: Adult Regular)   Pulse 79   Temp 96.8  F (36  C) (Tympanic)   Resp 16   Ht 1.943 m (6' 4.5\")   Wt 95.3 kg (210 lb)   SpO2 100%   BMI 25.23 kg/m   Estimated body mass index is 25.23 kg/m  as calculated from the following:    Height as of this encounter: 1.943 m (6' 4.5\").    Weight as of this encounter: 95.3 kg (210 lb). Body surface area is 2.27 meters squared.  Severe Pain (8) Comment: Data Unavailable   No LMP for male patient.  Allergies reviewed: Yes  Medications reviewed: Yes    Medications: Medication refills not needed today.  Pharmacy name entered into [employer]: [facility] - [address]    Frailty Screening:   Is the patient here for a new oncology consult visit in cancer care? 2. 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        History of Present Illness    Patient " is here in follow-up.  He is here to discuss his final pathology report and decide on a treatment plan.  His pathology was referred for second opinion to [facility] as the cell of origin was not completely clear.  It was consistent with a poorly differentiated carcinoma  with an immunophenotype that was nonspecific however the predominant morphologic and immunophenotypic features are more suggestive of a cholangiocarcinoma.  Patient reports that he is starting to have symptoms.  He has lost his appetite and is losing weight he also complains of feelings of nausea.  He denies any significant pain but does complains of weakness.  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     No Comments Provided     Enlarged prostate without lower urinary tract symptoms (luts)     No Comments Provided     Essential (primary) hypertension     No Comments Provided     Gastro-esophageal reflux disease without esophagitis     No Comments Provided     Hypothyroidism     No Comments Provided     Other specified congenital malformations of intestine     No Comments Provided     PONV (postoperative nausea and vomiting)     with surgeries in the 80's       Past Surgical History:   Procedure Laterality Date     BACK SURGERY      1986 and 1987     COLONOSCOPY      7/29/05,next due 2015     COLONOSCOPY  10/26/2015    Tubular adenoma,F/U 2020     COLONOSCOPY N/A 11/16/2020    F/U 2025 tubular adenomas     CYSTOSCOPY, TRANSURETHRAL RESECTION (TUR) PROSTATE, COMBINED N/A 07/30/2019    Procedure: CYSTOSCOPY, WITH TRANSURETHRAL RESECTION (TUR) PROSTATE;  Surgeon: Art Liu MD;  Location: [facility] OR     IR CHEST  "PORT PLACEMENT > 5 YRS OF AGE  3/10/2025     OTHER SURGICAL HISTORY      ADU113,PARTIAL THYROIDECTOMY     OTHER SURGICAL HISTORY      [id number],ANESTHESIA ALERT,Notes no prior complications from anesthesia.:     [facility] TUR PROSTATE BIOPSIES         Physical Exam    /80 (BP Location: Left arm, Patient Position: Sitting, Cuff Size: Adult Regular)   Pulse 79   Temp 96.8  F (36  C) (Tympanic)   Resp 16   Ht 1.943 m (6' 4.5\")   Wt 95.3 kg (210 lb)   SpO2 100%   BMI 25.23 kg/m      General: alert, awake, not in acute distress  HEENT: Head: Normal, normocephalic, atraumatic.  Eye: Normal external eye, conjunctiva, lids cornea, DARY.  Nose: Normal external nose, mucus membranes and septum.  Pharynx: Normal buccal mucosa. Normal pharynx.  Neck / Thyroid: Supple, no masses, nodes, nodules or enlargement.  Lymphatics: No abnormally enlarged lymph nodes.  Chest: Normal chest wall and respirations. Clear to auscultation.  No crackles or rhonchi's  Heart: S1 S2 RRR, no murmur.   Abdomen: abdomen is soft without significant tenderness, masses, organomegaly or guarding  Extremities: normal strength, tone, and muscle mass  Skin: normal. no rash or abnormalities  CNS: non focal.    Lab Results    No results found for this or any previous visit (from the past 240 hours).      Imaging    PET Oncology (Eyes to Thighs)    Result Date: 3/12/2025  EXAM: PET ONCOLOGY (EYES TO THIGHS), CT CHEST W CONTRAST LOCATION: [facility] DATE: 3/12/2025 INDICATION: Abnormal findings on diagnostic imaging of liver and biliary tract. Carcinoma of unknown origin liver biopsy. COMPARISON: CT abdomen pelvis 2/14/2025. CONTRAST: 100 mL Isovue-370, IV. TECHNIQUE: Serum glucose level 76 mg/dL. One hour post intravenous administration of 12.9mCi F18DG, PET imaging was performed from the skull vertex to mid thighs, utilizing attenuation correction with concurrent axial CT and PET/CT image fusion. Separate  diagnostic CT of the " chest was performed. Dose reduction techniques were used. PET/CT FINDINGS: Large heterogeneous FDG avid liver metastases which measures 13.1 x 12.9 x 23.9 cm (SUV max 45.1) with several smaller FDG avid satellite lesions throughout the remaining liver parenchyma, suspicious for neoplasm. Associated FDG avid upper abdominal lymph nodes including gastrohepatic (SUV max 9.0), portacaval (SUV max 17.4) and retroperitoneal (SUV max 3.3), suspicious for zulema metastatic disease. FDG avid osseous lesion in the right iliac crest measuring 3.5 x 2.5 cm (SUV max 31.8) and subcentimeter lesion in the medial left clavicle (SUV max 3.5), suspicious for sites of osseous metastatic disease. CT FINDINGS: Mild senescent intracranial changes. Right chest wall port with tip in mid SVC. Mild to moderate coronary artery calcification. Few strands of scar/atelectasis in the lungs. There are few tiny pulmonary nodules (for example the right upper lobe measuring  3 mm series 8 image 31, periphery of the right upper lobe measuring 3 mm series 8 image 53, right lower lobe nodule measuring 6 mm series 8 image 72, and right lower lobe measuring 4 mm series 8 image 77), which are too small to characterize by PET/CT. Previously described cardiophrenic lymph nodes are not FDG avid above background levels. Intrahepatic biliary dilatation and narrowing of the portal vein adjacent to the dominant liver lesion. Right renal cyst. Additional scattered atherosclerotic calcifications. Small volume ascites. Enlarged prostate. Pelvic phleboliths. Multilevel degenerative changes of the spine.     IMPRESSION: 1.  FDG avid large dominant liver mass, additional satellite nodules in the liver, upper abdominal and retroperitoneal lymph nodes, and osseous lesions in the right iliac crest and left clavicle, suspicious for active neoplasm.. 2.  There are few tiny pulmonary nodules which are too small to characterize by PET/CT. Recommend continued attention on  follow-up.     CT Chest w Contrast    Result Date: 3/12/2025  EXAM: PET ONCOLOGY (EYES TO THIGHS), CT CHEST W CONTRAST LOCATION: [facility] DATE: 3/12/2025 INDICATION: Abnormal findings on diagnostic imaging of liver and biliary tract. Carcinoma of unknown origin liver biopsy. COMPARISON: CT abdomen pelvis 2/14/2025. CONTRAST: 100 mL Isovue-370, IV. TECHNIQUE: Serum glucose level 76 mg/dL. One hour post intravenous administration of 12.9mCi F18DG, PET imaging was performed from the skull vertex to mid thighs, utilizing attenuation correction with concurrent axial CT and PET/CT image fusion. Separate  diagnostic CT of the chest was performed. Dose reduction techniques were used. PET/CT FINDINGS: Large heterogeneous FDG avid liver metastases which measures 13.1 x 12.9 x 23.9 cm (SUV max 45.1) with several smaller FDG avid satellite lesions throughout the remaining liver parenchyma, suspicious for neoplasm. Associated FDG avid upper abdominal lymph nodes including gastrohepatic (SUV max 9.0), portacaval (SUV max 17.4) and retroperitoneal (SUV max 3.3), suspicious for zulema metastatic disease. FDG avid osseous lesion in the right iliac crest measuring 3.5 x 2.5 cm (SUV max 31.8) and subcentimeter lesion in the medial left clavicle (SUV max 3.5), suspicious for sites of osseous metastatic disease. CT FINDINGS: Mild senescent intracranial changes. Right chest wall port with tip in mid SVC. Mild to moderate coronary artery calcification. Few strands of scar/atelectasis in the lungs. There are few tiny pulmonary nodules (for example the right upper lobe measuring  3 mm series 8 image 31, periphery of the right upper lobe measuring 3 mm series 8 image 53, right lower lobe nodule measuring 6 mm series 8 image 72, and right lower lobe measuring 4 mm series 8 image 77), which are too small to characterize by PET/CT. Previously described cardiophrenic lymph nodes are not FDG avid above background levels.  Intrahepatic biliary dilatation and narrowing of the portal vein adjacent to the dominant liver lesion. Right renal cyst. Additional scattered atherosclerotic calcifications. Small volume ascites. Enlarged prostate. Pelvic phleboliths. Multilevel degenerative changes of the spine.     IMPRESSION: 1.  FDG avid large dominant liver mass, additional satellite nodules in the liver, upper abdominal and retroperitoneal lymph nodes, and osseous lesions in the right iliac crest and left clavicle, suspicious for active neoplasm.. 2.  There are few tiny pulmonary nodules which are too small to characterize by PET/CT. Recommend continued attention on follow-up.     IR Chest Port Placement > 5 Yrs of Age    Result Date: 3/10/2025  [place] [facility] LOCATION: [facility] DATE: 3/10/2025 PROCEDURE: IMPLANTABLE VENOUS CHEST PORT PLACEMENT (POWER INJECTABLE) INTERVENTIONAL RADIOLOGIST: Harinder Ramirez MD. INDICATION: Bile duct carcinoma. Chest port placement for initiation of therapy. CONSENT: The risks, benefits and alternatives of implantable venous chest port placement were discussed with the patient in detail. All questions were answered. Informed consent was given to proceed with the procedure. MODERATE SEDATION: Versed 1.5 mg IV; Fentanyl 75 mcg IV.  During the timeout, immediately prior to the administration of medications, the patient was reassessed for adequacy to receive conscious sedation. Under physician supervision, Versed and fentanyl were administered for moderate sedation. Pulse oximetry, heart rate and blood pressure were continuously monitored by an independent trained observer. The physician spent 15 minutes of face-to-face sedation time with the patient. CONTRAST: None. ANTIBIOTICS: Ancef 2 g IV. ADDITIONAL MEDICATIONS: None. FLUOROSCOPIC TIME: 0.9 minutes. RADIATION DOSE: Air Kerma: 6 mGy. COMPLICATIONS: No immediate complications. STERILE BARRIER TECHNIQUE: Maximum sterile barrier  technique was used. Cutaneous antisepsis was performed at the operative site with application of 2% chlorhexidine and large sterile drape. Prior to the procedure, the  and assistant performed hand hygiene and wore hat, mask, sterile gown, and sterile gloves during the entire procedure. PROCEDURE:  Using real-time ultrasound guidance the right internal jugular vein was accessed. A subcutaneous pocket was created and irrigated with sterile normal saline. The catheter tubing was tunneled in an antegrade fashion from the port pocket to the dermatotomy site. Over a guidewire, and under direct fluoroscopic visualization a peel-away sheath was advanced over the wire. Through the peel-away sheath, the catheter tubing was advanced until the tip was at the cavoatrial junction. The catheter tubing was cut to length and attached firmly to the port. The port was placed within the subcutaneous pocket and tested. The port pocket incision was closed with layered absorbable suture and surgical glue. The dermatotomy site was closed with surgical glue. FINDINGS: Ultrasound demonstrates an anechoic and compressible jugular vein. A permanent image was stored. At the completion of the study the port tip lies near the cavoatrial junction.     IMPRESSION:  1.  Successful implantable venous chest port placement as detailed above. 2.  The implantable venous chest port was placed under fluoroscopic guidance and is ready for use immediately.     US Biopsy Liver    Result Date: 2/26/2025  EXAM: 1. PERCUTANEOUS BIOPSY LIVER 2. ULTRASOUND GUIDANCE 3. CONSCIOUS SEDATION LOCATION: [facility] DATE: 2/26/2025 INDICATION: Infiltrative hepatic mass and smaller satellite lesions, suspected primary liver biliary malignancy. PROCEDURE: Informed consent obtained. Site marked. Prior images reviewed. Required items made available. Patient identity was confirmed verbally and with arm band. Patient reevaluated immediately  before administering sedation. Universal protocol was followed. Time out performed. The site was prepped and draped in sterile fashion. 10 mL of 1 percent lidocaine was infused into the local soft tissues. Using standard technique and under direct ultrasound guidance, a 18 gauge biopsy needle was used to make 5 core biopsies. Tissue was submitted to Pathology and adequate by preliminary review. The patient tolerated the procedure well. No complications. SEDATION: Versed 2 mg. Fentanyl 100 mcg. The procedure was performed with administration intravenous conscious sedation with appropriate preoperative, intraoperative, and postoperative evaluation. 33 minutes of supervised face to face conscious sedation time was provided by a radiology nurse under my direct supervision.     IMPRESSION: Status post US-guided biopsy of hepatic mass. Reference CPT Code: 23268, 34996, 40688, 72810         Assessment and Plan    Metastatic intrahepatic cholangiocarcinoma    Patient is here in follow-up and is accompanied by his wife Since I last saw him he had a PET scan done and his pathology report has been finalized.  It took longer than 6 and 2 of his kids.  It was sent for [facility] and they took more than expected time.  After doing an extensive immunophenotypic workup is still not definitive but the immunophenotypic profile and the morphologic features are more in line with a cholangiocarcinoma.  The radiologic features are also more consistent with that as I had discussed and reviewed his films in detail with our radiologist.  This was explained to the patient and the family and explained to them that this is incurable in nature and all treatment will be difficult.  I will like to treat him with a combination of gemcitabine cisplatin and durvalumab.  His kidney function however is borderline and I will repeat the kidney function and make a final decision on the regimen based on that.  Once we start him on treatment I would like  to restage after 2-3 cycles.    Patient's creatinine today was 1.64 with a GFR of 43.  This was discussed with pharmacy based on his borderline kidney function I would decrease the dose of cisplatin by 25% as recommended.  I do not want to substituted with carboplatin as efficacy data with carboplatin is not as robust in this particular pathology.  We will closely follow kidney function.    Anorexia and nausea  Patient is having a hard time eating and is losing weight.  He also complains of nausea.  I will plan to give him Zofran to take up to 3 times a day as needed to control the nausea.  I will also recommend using high-calorie shakes but he gets nauseous with them.  I will have one of our dietitian reach out to the patient so we can help him come up with a diet plan to maximize his caloric intake    Signed by: Aylin Gaviria MD        CC: Derrick Mckeon MD       Again, thank you for allowing me to participate in the care of your patient.        Sincerely,        Aylin Gaviria MD    Electronically signed

## 2025-03-25 LAB
HBV CORE AB SERPL QL IA: NONREACTIVE
HBV SURFACE AG SERPL QL IA: NONREACTIVE
HCV AB SERPL QL IA: NONREACTIVE

## 2025-03-25 NOTE — PROGRESS NOTES
[facility]: Cancer Care Plan of Care Education Note                                    Discussion with Patient:                                                      New chemo regimen of  Durvalumab / CISplatin / Gemcitabine  was ordered and scheduled to begin on 3/27/24, 21 day cycle  -Day 1 Durvalumab / CISplatin / Gemcitabine    -Day 8 CISplatin / Gemcitabine    Chemo will be infused through implanted port that was placed on 3/10, declined need for emla cream at this time.    He understands that he will have labs, provider visit and infusion on Day 1 of each of his cycles.  Shared anticipated length of time for his chemo and asked them to allow extra time as there may be a delay with getting chemo from pharmacy, waiting for labs to result, etc.  Recommended to have a  for his chemo apt.  Educated about what to bring to his infusion visit, light lunch or snacks that are peanut free, any special drinks that he prefers. Shared that snacks, coffee, tea  juice and soda are available in the infusion area.  Also suggested that he may wish to bring light reading material, ear- phones, phone, computer and/or charging cords. Warm blankets are provide    Reviewed take home medications, how and when to take. Meds were released from the treatment plan and sent to his confirmed pharmacy in [facility]    ondansetron (ZOFRAN) 8 MG tablet jenna 1 tablet (8 mg) by mouth every 8 hours as needed for nausea.   prochlorperazine (COMPAZINE) 10 MG tablet Take 1 tablet (10 mg) by mouth every 6 hours as needed for nausea or vomiting.      dexAMETHasone (DECADRON) 4 MG tablet Take 2 tablets (8 mg) by mouth daily. Take for 3 days, starting the day after chemo     Reviewed that he will be given premedications in his IV prior to his chemo drugs to help minimize side effects from chemo:on day 2   and 9.     - Dr. Gaviria reviewed Chemo drugs and side effects in detail with patient during clinic visit today. Writer provided handouts  from via  "oncology on all drugs in the treatment plan and  briefly reviewed side effects. Chemo folder provided and reviewed.   Reviewed \"home Instructions after chemo and when to call your care team\" handout  in detail. Confirmed that he does have a thermometer at home that works well. Encouraged to call with any symptoms or concerns he experiences  so that we can best  help  him manage any side effects from treatment.  Chemo folder included handouts of:  Home instructions post chemo, Getting Ready for Chemo Comparing Chemotherapy, immunotherapy and Targeted Therapy, Understanding Clinical Trials, Your Cancer Care Team and My Chart, Integrative Therapies during Chemo, Oncology Supportive Care Services\", Honoring Choices       Contact information provided for nurse triage number at [facility] and after hour contact information    Will call a couple days post chemo to check in on how he is tolerating treatment    Assessment:                                                      Assessment completed with:: Patient, Family    Plan of Care Education   Yearly learning assessment completed?: Yes (see Education tab)  Diagnosis:: Biliary Tract Cancer  Tx plan/regimen:: Durvalumab / CISplatin / Gemcitabine  Does patient understand treatment plan/regimen?: Yes  Preparing for treatment:: Reviewed treatment preparation information with patient (vascular access, day of chemo, visitor policy, what to bring, etc.)  Vascular access education provided for:: Port (already placed)  Side effect education:: Diarrhea/Constipation, Endocrine effects, Fatigue, Hair loss, Immune-mediated effects, Infection, Neuropathy, Nausea/Vomiting, Mylosuppression, Mouth sores, Lab value monitoring (anemia, neutropenia, thrombocytopenia), Urinary, Skin changes  Supportive services education provided for:: Nutrition, Oncology behavioral health, Social work  Safety/self care at home reviewed with patient:: Yes  Coping - concerns/fears reviewed with patient:: Yes  Plan of " Care:: DANIELLE follow-up appointment, Lab appointment, MD follow-up appointment, Treatment schedule, Imaging  When to call provider:: Bleeding, Increased shortness of breath, New/worsening pain, Shaking chills, Temperature >100.4F, Uncontrolled diarrhea/constipation, Uncontrolled nausea/vomiting  Reasons for deferring treatment reviewed with patient:: Yes    Evaluation of Learning  Patient Education Provided: Yes  Readiness:: Eager  Method:: Literature, Explanation  Response:: Verbalizes understanding        Intervention/Education provided during outreach:                                                        See above    Confirmed patient has clinic and triage numbers    Signature:  Patricia Mack RN

## 2025-03-26 LAB — CANCER AG19-9 SERPL IA-ACNC: 83 U/ML

## 2025-03-27 ENCOUNTER — INFUSION THERAPY VISIT (OUTPATIENT)
Dept: INFUSION THERAPY | Facility: HOSPITAL | Age: 75
End: 2025-03-27
Attending: INTERNAL MEDICINE
Payer: COMMERCIAL

## 2025-03-27 ENCOUNTER — ONCOLOGY VISIT (OUTPATIENT)
Dept: ONCOLOGY | Facility: HOSPITAL | Age: 75
End: 2025-03-27
Attending: INTERNAL MEDICINE
Payer: COMMERCIAL

## 2025-03-27 VITALS
RESPIRATION RATE: 16 BRPM | WEIGHT: 217.7 LBS | HEART RATE: 78 BPM | TEMPERATURE: 97.8 F | SYSTOLIC BLOOD PRESSURE: 155 MMHG | BODY MASS INDEX: 25.71 KG/M2 | HEIGHT: 77 IN | DIASTOLIC BLOOD PRESSURE: 82 MMHG | OXYGEN SATURATION: 97 %

## 2025-03-27 DIAGNOSIS — R16.0 LIVER MASS: ICD-10-CM

## 2025-03-27 DIAGNOSIS — C22.1 CHOLANGIOCARCINOMA (H): Primary | ICD-10-CM

## 2025-03-27 DIAGNOSIS — E83.52 HYPERCALCEMIA: ICD-10-CM

## 2025-03-27 LAB
ALBUMIN SERPL BCG-MCNC: 3.2 G/DL (ref 3.5–5.2)
ALP SERPL-CCNC: 514 U/L (ref 40–150)
ALT SERPL W P-5'-P-CCNC: 34 U/L (ref 0–70)
ANION GAP SERPL CALCULATED.3IONS-SCNC: 10 MMOL/L (ref 7–15)
AST SERPL W P-5'-P-CCNC: 55 U/L (ref 0–45)
BASOPHILS # BLD AUTO: 0 10E3/UL (ref 0–0.2)
BASOPHILS NFR BLD AUTO: 0 %
BILIRUB SERPL-MCNC: 0.4 MG/DL
BUN SERPL-MCNC: 26.8 MG/DL (ref 8–23)
CALCIUM SERPL-MCNC: 11.6 MG/DL (ref 8.8–10.4)
CHLORIDE SERPL-SCNC: 107 MMOL/L (ref 98–107)
CREAT SERPL-MCNC: 1.71 MG/DL (ref 0.67–1.17)
EGFRCR SERPLBLD CKD-EPI 2021: 41 ML/MIN/1.73M2
EOSINOPHIL # BLD AUTO: 0.1 10E3/UL (ref 0–0.7)
EOSINOPHIL NFR BLD AUTO: 1 %
ERYTHROCYTE [DISTWIDTH] IN BLOOD BY AUTOMATED COUNT: 13.6 % (ref 10–15)
GLUCOSE SERPL-MCNC: 128 MG/DL (ref 70–99)
HCO3 SERPL-SCNC: 22 MMOL/L (ref 22–29)
HCT VFR BLD AUTO: 29.3 % (ref 40–53)
HGB BLD-MCNC: 9.5 G/DL (ref 13.3–17.7)
IMM GRANULOCYTES # BLD: 0.1 10E3/UL
IMM GRANULOCYTES NFR BLD: 1 %
LYMPHOCYTES # BLD AUTO: 0.6 10E3/UL (ref 0.8–5.3)
LYMPHOCYTES NFR BLD AUTO: 7 %
MAGNESIUM SERPL-MCNC: 1.8 MG/DL (ref 1.7–2.3)
MCH RBC QN AUTO: 28.8 PG (ref 26.5–33)
MCHC RBC AUTO-ENTMCNC: 32.4 G/DL (ref 31.5–36.5)
MCV RBC AUTO: 89 FL (ref 78–100)
MONOCYTES # BLD AUTO: 0.8 10E3/UL (ref 0–1.3)
MONOCYTES NFR BLD AUTO: 8 %
NEUTROPHILS # BLD AUTO: 7.9 10E3/UL (ref 1.6–8.3)
NEUTROPHILS NFR BLD AUTO: 83 %
NRBC # BLD AUTO: 0 10E3/UL
NRBC BLD AUTO-RTO: 0 /100
PLATELET # BLD AUTO: 279 10E3/UL (ref 150–450)
POTASSIUM SERPL-SCNC: 3.9 MMOL/L (ref 3.4–5.3)
PROT SERPL-MCNC: 7 G/DL (ref 6.4–8.3)
RBC # BLD AUTO: 3.3 10E6/UL (ref 4.4–5.9)
SODIUM SERPL-SCNC: 139 MMOL/L (ref 135–145)
TSH SERPL DL<=0.005 MIU/L-ACNC: 2.86 UIU/ML (ref 0.3–4.2)
WBC # BLD AUTO: 9.6 10E3/UL (ref 4–11)

## 2025-03-27 PROCEDURE — 83735 ASSAY OF MAGNESIUM: CPT | Performed by: INTERNAL MEDICINE

## 2025-03-27 PROCEDURE — 36591 DRAW BLOOD OFF VENOUS DEVICE: CPT | Performed by: INTERNAL MEDICINE

## 2025-03-27 PROCEDURE — 250N000011 HC RX IP 250 OP 636: Performed by: INTERNAL MEDICINE

## 2025-03-27 PROCEDURE — 84443 ASSAY THYROID STIM HORMONE: CPT | Performed by: INTERNAL MEDICINE

## 2025-03-27 PROCEDURE — 82040 ASSAY OF SERUM ALBUMIN: CPT | Performed by: INTERNAL MEDICINE

## 2025-03-27 PROCEDURE — 258N000003 HC RX IP 258 OP 636: Performed by: INTERNAL MEDICINE

## 2025-03-27 PROCEDURE — G0463 HOSPITAL OUTPT CLINIC VISIT: HCPCS | Performed by: INTERNAL MEDICINE

## 2025-03-27 PROCEDURE — 85004 AUTOMATED DIFF WBC COUNT: CPT | Performed by: INTERNAL MEDICINE

## 2025-03-27 RX ORDER — ALBUTEROL SULFATE 90 UG/1
1-2 INHALANT RESPIRATORY (INHALATION)
Start: 2025-04-03

## 2025-03-27 RX ORDER — ALBUTEROL SULFATE 0.83 MG/ML
2.5 SOLUTION RESPIRATORY (INHALATION)
Status: CANCELLED | OUTPATIENT
Start: 2025-03-27

## 2025-03-27 RX ORDER — METHYLPREDNISOLONE SODIUM SUCCINATE 40 MG/ML
40 INJECTION INTRAMUSCULAR; INTRAVENOUS
Start: 2025-04-03

## 2025-03-27 RX ORDER — DIPHENHYDRAMINE HYDROCHLORIDE 50 MG/ML
50 INJECTION, SOLUTION INTRAMUSCULAR; INTRAVENOUS
Status: CANCELLED
Start: 2025-03-27

## 2025-03-27 RX ORDER — LORAZEPAM 2 MG/ML
0.5 INJECTION INTRAMUSCULAR EVERY 4 HOURS PRN
OUTPATIENT
Start: 2025-04-03

## 2025-03-27 RX ORDER — DIPHENHYDRAMINE HYDROCHLORIDE 50 MG/ML
25 INJECTION, SOLUTION INTRAMUSCULAR; INTRAVENOUS
Status: DISCONTINUED | OUTPATIENT
Start: 2025-03-27 | End: 2025-03-27 | Stop reason: HOSPADM

## 2025-03-27 RX ORDER — HEPARIN SODIUM,PORCINE 10 UNIT/ML
5-20 VIAL (ML) INTRAVENOUS DAILY PRN
Status: DISCONTINUED | OUTPATIENT
Start: 2025-03-27 | End: 2025-03-27 | Stop reason: HOSPADM

## 2025-03-27 RX ORDER — EPINEPHRINE 1 MG/ML
0.3 INJECTION, SOLUTION INTRAMUSCULAR; SUBCUTANEOUS EVERY 5 MIN PRN
OUTPATIENT
Start: 2025-04-03

## 2025-03-27 RX ORDER — PALONOSETRON 0.05 MG/ML
0.25 INJECTION, SOLUTION INTRAVENOUS ONCE
Status: COMPLETED | OUTPATIENT
Start: 2025-03-27 | End: 2025-03-27

## 2025-03-27 RX ORDER — DIPHENHYDRAMINE HYDROCHLORIDE 50 MG/ML
25 INJECTION, SOLUTION INTRAMUSCULAR; INTRAVENOUS
Status: CANCELLED
Start: 2025-03-27

## 2025-03-27 RX ORDER — DIPHENHYDRAMINE HYDROCHLORIDE 50 MG/ML
25 INJECTION, SOLUTION INTRAMUSCULAR; INTRAVENOUS
Start: 2025-04-03

## 2025-03-27 RX ORDER — HEPARIN SODIUM,PORCINE 10 UNIT/ML
5-20 VIAL (ML) INTRAVENOUS DAILY PRN
Status: CANCELLED | OUTPATIENT
Start: 2025-03-27

## 2025-03-27 RX ORDER — HEPARIN SODIUM (PORCINE) LOCK FLUSH IV SOLN 100 UNIT/ML 100 UNIT/ML
5 SOLUTION INTRAVENOUS
Status: CANCELLED | OUTPATIENT
Start: 2025-03-27

## 2025-03-27 RX ORDER — HEPARIN SODIUM,PORCINE 10 UNIT/ML
5-20 VIAL (ML) INTRAVENOUS DAILY PRN
OUTPATIENT
Start: 2025-04-03

## 2025-03-27 RX ORDER — PALONOSETRON 0.05 MG/ML
0.25 INJECTION, SOLUTION INTRAVENOUS ONCE
Status: CANCELLED | OUTPATIENT
Start: 2025-03-27

## 2025-03-27 RX ORDER — ALBUTEROL SULFATE 0.83 MG/ML
2.5 SOLUTION RESPIRATORY (INHALATION)
OUTPATIENT
Start: 2025-04-03

## 2025-03-27 RX ORDER — EPINEPHRINE 1 MG/ML
0.3 INJECTION, SOLUTION INTRAMUSCULAR; SUBCUTANEOUS EVERY 5 MIN PRN
Status: CANCELLED | OUTPATIENT
Start: 2025-03-27

## 2025-03-27 RX ORDER — DIPHENHYDRAMINE HYDROCHLORIDE 50 MG/ML
50 INJECTION, SOLUTION INTRAMUSCULAR; INTRAVENOUS
Status: DISCONTINUED | OUTPATIENT
Start: 2025-03-27 | End: 2025-03-27 | Stop reason: HOSPADM

## 2025-03-27 RX ORDER — MEPERIDINE HYDROCHLORIDE 25 MG/ML
25 INJECTION INTRAMUSCULAR; INTRAVENOUS; SUBCUTANEOUS
Status: CANCELLED | OUTPATIENT
Start: 2025-03-27

## 2025-03-27 RX ORDER — DIPHENHYDRAMINE HYDROCHLORIDE 50 MG/ML
50 INJECTION, SOLUTION INTRAMUSCULAR; INTRAVENOUS
Start: 2025-04-03

## 2025-03-27 RX ORDER — PALONOSETRON 0.05 MG/ML
0.25 INJECTION, SOLUTION INTRAVENOUS ONCE
OUTPATIENT
Start: 2025-04-03

## 2025-03-27 RX ORDER — MEPERIDINE HYDROCHLORIDE 25 MG/ML
25 INJECTION INTRAMUSCULAR; INTRAVENOUS; SUBCUTANEOUS
OUTPATIENT
Start: 2025-04-03

## 2025-03-27 RX ORDER — ALBUTEROL SULFATE 0.83 MG/ML
2.5 SOLUTION RESPIRATORY (INHALATION)
Status: DISCONTINUED | OUTPATIENT
Start: 2025-03-27 | End: 2025-03-27 | Stop reason: HOSPADM

## 2025-03-27 RX ORDER — METHYLPREDNISOLONE SODIUM SUCCINATE 40 MG/ML
40 INJECTION INTRAMUSCULAR; INTRAVENOUS
Status: CANCELLED
Start: 2025-03-27

## 2025-03-27 RX ORDER — HEPARIN SODIUM (PORCINE) LOCK FLUSH IV SOLN 100 UNIT/ML 100 UNIT/ML
5 SOLUTION INTRAVENOUS
Status: DISCONTINUED | OUTPATIENT
Start: 2025-03-27 | End: 2025-03-27 | Stop reason: HOSPADM

## 2025-03-27 RX ORDER — LORAZEPAM 2 MG/ML
0.5 INJECTION INTRAMUSCULAR EVERY 4 HOURS PRN
Status: CANCELLED | OUTPATIENT
Start: 2025-03-27

## 2025-03-27 RX ORDER — MEPERIDINE HYDROCHLORIDE 25 MG/ML
25 INJECTION INTRAMUSCULAR; INTRAVENOUS; SUBCUTANEOUS
Status: DISCONTINUED | OUTPATIENT
Start: 2025-03-27 | End: 2025-03-27 | Stop reason: HOSPADM

## 2025-03-27 RX ORDER — ALBUTEROL SULFATE 90 UG/1
1-2 INHALANT RESPIRATORY (INHALATION)
Status: CANCELLED
Start: 2025-03-27

## 2025-03-27 RX ORDER — METHYLPREDNISOLONE SODIUM SUCCINATE 40 MG/ML
40 INJECTION INTRAMUSCULAR; INTRAVENOUS
Status: DISCONTINUED | OUTPATIENT
Start: 2025-03-27 | End: 2025-03-27 | Stop reason: HOSPADM

## 2025-03-27 RX ORDER — ALBUTEROL SULFATE 90 UG/1
1-2 INHALANT RESPIRATORY (INHALATION)
Status: DISCONTINUED | OUTPATIENT
Start: 2025-03-27 | End: 2025-03-27 | Stop reason: HOSPADM

## 2025-03-27 RX ORDER — EPINEPHRINE 1 MG/ML
0.3 INJECTION, SOLUTION INTRAMUSCULAR; SUBCUTANEOUS EVERY 5 MIN PRN
Status: DISCONTINUED | OUTPATIENT
Start: 2025-03-27 | End: 2025-03-27 | Stop reason: HOSPADM

## 2025-03-27 RX ORDER — HEPARIN SODIUM (PORCINE) LOCK FLUSH IV SOLN 100 UNIT/ML 100 UNIT/ML
5 SOLUTION INTRAVENOUS
OUTPATIENT
Start: 2025-04-03

## 2025-03-27 RX ADMIN — DURVALUMAB 1500 MG: 120 INJECTION, SOLUTION INTRAVENOUS at 10:18

## 2025-03-27 RX ADMIN — SODIUM CHLORIDE 1000 ML: 0.9 INJECTION, SOLUTION INTRAVENOUS at 09:08

## 2025-03-27 RX ADMIN — GEMCITABINE 2200 MG: 38 INJECTION, SOLUTION INTRAVENOUS at 11:20

## 2025-03-27 RX ADMIN — SODIUM CHLORIDE 250 ML: 0.9 INJECTION, SOLUTION INTRAVENOUS at 11:09

## 2025-03-27 RX ADMIN — CISPLATIN 43 MG: 1 INJECTION, SOLUTION INTRAVENOUS at 11:54

## 2025-03-27 RX ADMIN — HEPARIN 5 ML: 100 SYRINGE at 12:57

## 2025-03-27 RX ADMIN — DEXAMETHASONE SODIUM PHOSPHATE: 10 INJECTION, SOLUTION INTRAMUSCULAR; INTRAVENOUS at 09:43

## 2025-03-27 RX ADMIN — PALONOSETRON 0.25 MG: 0.05 INJECTION, SOLUTION INTRAVENOUS at 09:13

## 2025-03-27 ASSESSMENT — PAIN SCALES - GENERAL: PAINLEVEL_OUTOF10: NO PAIN (0)

## 2025-03-27 NOTE — PROGRESS NOTES
Infusion Nursing Note:  David TABATHA Samaniego presents today for D1C1 Imfinzi,Gemzar, Cisplatin.    Patient seen by provider today: Yes: Dr Gaviria   present during visit today: Not Applicable.    Note: pt cisplatin reduced by 25% per Dr Gaviria, pt received 1 liter NS before cisplatin given. Went over each med with pt and potential reaction side effects. Talked with pt about double flushing for 48 hours after chemo. When to call triage nurses and verified pt had the number. Pt given teaching on constipation issues and nausea issues, call or go to ER with temp over 100.5. .    Premeds Given: aloxi, dexamethasone IV, and emend    Intravenous Access:  Implanted Port.    Treatment Conditions:  Lab Results   Component Value Date    HGB 9.5 (L) 03/27/2025    WBC 9.6 03/27/2025    ANEUTAUTO 7.9 03/27/2025     03/27/2025        Lab Results   Component Value Date     03/27/2025    POTASSIUM 3.9 03/27/2025    MAG 1.8 03/27/2025    CR 1.71 (H) 03/27/2025    BENITO 11.6 (H) 03/27/2025    BILITOTAL 0.4 03/27/2025    ALBUMIN 3.2 (L) 03/27/2025    ALT 34 03/27/2025    AST 55 (H) 03/27/2025       Results reviewed, labs MET treatment parameters, ok to proceed with treatment.      Post Infusion Assessment:  Patient tolerated infusion without incident.  Blood return noted pre and post infusion.  Site patent and intact, free from redness, edema or discomfort.  Access discontinued per protocol.       Discharge Plan:   Patient and/or family verbalized understanding of discharge instructions and all questions answered.      Concetta Stacy RN

## 2025-03-27 NOTE — PROGRESS NOTES
"Oncology Rooming Note    March 27, 2025 8:29 AM   David Samaniego is a 74 year old male who presents for:    Chief Complaint   Patient presents with    Oncology Clinic Visit     Cholangiocarcinoma     Initial Vitals: BP (!) 155/82 (BP Location: Left arm, Patient Position: Sitting, Cuff Size: Adult Regular)   Pulse 78   Temp 97.8  F (36.6  C) (Tympanic)   Resp 16   Ht 1.943 m (6' 4.5\")   Wt 98.7 kg (217 lb 11.2 oz)   SpO2 97%   BMI 26.15 kg/m   Estimated body mass index is 26.15 kg/m  as calculated from the following:    Height as of this encounter: 1.943 m (6' 4.5\").    Weight as of this encounter: 98.7 kg (217 lb 11.2 oz). Body surface area is 2.31 meters squared.  No Pain (0) Comment: Data Unavailable   No LMP for male patient.  Allergies reviewed: Yes  Medications reviewed: Yes    Medications: Medication refills not needed today.  Pharmacy name entered into [facility]:    [facility] - [place], [place] - [address] [place] [address] DRUG STORE #[id number] [place] [id number] [facility] AT [facility] [address]    Frailty Screening:   Is the patient here for a new oncology consult visit in cancer care? 2. No    PHQ9:  Did this patient require a PHQ9?: No      Clinical concerns:  start treatment       Eli Remy            "

## 2025-03-27 NOTE — PROGRESS NOTES
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    Treatment  Patient started on gemcitabine cisplatin durvalumab on 3/27/2025.  Dose of cisplatin decreased by 25% due to renal function        History of Present Illness  Patient is here in follow-up.  He is here to start chemotherapy.  He feels fine.  He has been taking nausea medication and his nausea has been better and he has been eating more than before.  He denies any pain he continues to have some bloating.  His energy level continues to be low.    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     No Comments Provided    Enlarged prostate without lower urinary tract symptoms (luts)     No Comments Provided    Essential (primary) hypertension     No Comments Provided    Gastro-esophageal reflux disease without esophagitis     No Comments Provided    Hypothyroidism     No Comments Provided    Other specified congenital malformations of intestine     No Comments Provided    PONV (postoperative nausea and vomiting)     with surgeries in the 80's       Past Surgical History:   Procedure Laterality Date    BACK SURGERY      1986 and 1987    COLONOSCOPY      7/29/05,next due 2015    COLONOSCOPY  10/26/2015    Tubular  "adenoma,F/U 2020    COLONOSCOPY N/A 11/16/2020    F/U 2025 tubular adenomas    CYSTOSCOPY, TRANSURETHRAL RESECTION (TUR) PROSTATE, COMBINED N/A 07/30/2019    Procedure: CYSTOSCOPY, WITH TRANSURETHRAL RESECTION (TUR) PROSTATE;  Surgeon: Art Liu MD;  Location: [facility] OR    IR CHEST PORT PLACEMENT > 5 YRS OF AGE  3/10/2025    OTHER SURGICAL HISTORY      [id number],PARTIAL THYROIDECTOMY    OTHER SURGICAL HISTORY      [id number],ANESTHESIA ALERT,Notes no prior complications from anesthesia.:    [facility] TUR PROSTATE BIOPSIES         Physical Exam    BP (!) 155/82 (BP Location: Left arm, Patient Position: Sitting, Cuff Size: Adult Regular)   Pulse 78   Temp 97.8  F (36.6  C) (Tympanic)   Resp 16   Ht 1.943 m (6' 4.5\")   Wt 98.7 kg (217 lb 11.2 oz)   SpO2 97%   BMI 26.15 kg/m      General: alert, awake, not in acute distress  HEENT: Head: Normal, normocephalic, atraumatic.  Eye: Normal external eye, conjunctiva, lids cornea, DARY.  Nose: Normal external nose, mucus membranes and septum.  Pharynx: Normal buccal mucosa. Normal pharynx.  Neck / Thyroid: Supple, no masses, nodes, nodules or enlargement.  Lymphatics: No abnormally enlarged lymph nodes.  Chest: Normal chest wall and respirations. Clear to auscultation.  No crackles or rhonchi's  Heart: S1 S2 RRR, no murmur.   Abdomen: abdomen is soft without significant tenderness, masses, organomegaly or guarding  Extremities: normal strength, tone, and muscle mass  Skin: normal. no rash or abnormalities  CNS: non focal.    Lab Results    Recent Results (from the past 240 hours)   Hepatitis C Screen Reflex to HCV RNA Quant and Genotype    Collection Time: 03/24/25  2:42 PM   Result Value Ref Range    Hepatitis C Antibody Nonreactive Nonreactive   Hepatitis B core antibody    Collection Time: 03/24/25  2:42 PM   Result Value Ref Range    Hepatitis B Core Antibody Total Nonreactive Nonreactive   Hepatitis B surface antigen    Collection Time: 03/24/25  2:42 PM   Result Value Ref " Range    Hepatitis B Surface Antigen Nonreactive Nonreactive   Comprehensive metabolic panel    Collection Time: 03/24/25  2:42 PM   Result Value Ref Range    Sodium 138 135 - 145 mmol/L    Potassium 4.1 3.4 - 5.3 mmol/L    Carbon Dioxide (CO2) 24 22 - 29 mmol/L    Anion Gap 11 7 - 15 mmol/L    Urea Nitrogen 26.4 (H) 8.0 - 23.0 mg/dL    Creatinine 1.64 (H) 0.67 - 1.17 mg/dL    GFR Estimate 43 (L) >60 mL/min/1.73m2    Calcium 12.2 (H) 8.8 - 10.4 mg/dL    Chloride 103 98 - 107 mmol/L    Glucose 100 (H) 70 - 99 mg/dL    Alkaline Phosphatase 514 (H) 40 - 150 U/L    AST 44 0 - 45 U/L    ALT 28 0 - 70 U/L    Protein Total 7.7 6.4 - 8.3 g/dL    Albumin 3.5 3.5 - 5.2 g/dL    Bilirubin Total 0.6 <=1.2 mg/dL   Lactate Dehydrogenase    Collection Time: 03/24/25  2:42 PM   Result Value Ref Range    Lactate Dehydrogenase 215 0 - 250 U/L   Cancer antigen 19-9    Collection Time: 03/24/25  2:42 PM   Result Value Ref Range    Cancer Antigen 19-9 83 (H) <=35 U/mL   CBC with platelets and differential    Collection Time: 03/24/25  2:42 PM   Result Value Ref Range    WBC Count 10.8 4.0 - 11.0 10e3/uL    RBC Count 3.83 (L) 4.40 - 5.90 10e6/uL    Hemoglobin 11.0 (L) 13.3 - 17.7 g/dL    Hematocrit 33.2 (L) 40.0 - 53.0 %    MCV 87 78 - 100 fL    MCH 28.7 26.5 - 33.0 pg    MCHC 33.1 31.5 - 36.5 g/dL    RDW 13.5 10.0 - 15.0 %    Platelet Count 345 150 - 450 10e3/uL    % Neutrophils 84 %    % Lymphocytes 6 %    % Monocytes 8 %    % Eosinophils 1 %    % Basophils 0 %    % Immature Granulocytes 1 %    NRBCs per 100 WBC 0 <1 /100    Absolute Neutrophils 9.0 (H) 1.6 - 8.3 10e3/uL    Absolute Lymphocytes 0.7 (L) 0.8 - 5.3 10e3/uL    Absolute Monocytes 0.8 0.0 - 1.3 10e3/uL    Absolute Eosinophils 0.1 0.0 - 0.7 10e3/uL    Absolute Basophils 0.0 0.0 - 0.2 10e3/uL    Absolute Immature Granulocytes 0.1 <=0.4 10e3/uL    Absolute NRBCs 0.0 10e3/uL   Comprehensive metabolic panel    Collection Time: 03/27/25  8:01 AM   Result Value Ref Range    Sodium  139 135 - 145 mmol/L    Potassium 3.9 3.4 - 5.3 mmol/L    Carbon Dioxide (CO2) 22 22 - 29 mmol/L    Anion Gap 10 7 - 15 mmol/L    Urea Nitrogen 26.8 (H) 8.0 - 23.0 mg/dL    Creatinine 1.71 (H) 0.67 - 1.17 mg/dL    GFR Estimate 41 (L) >60 mL/min/1.73m2    Calcium 11.6 (H) 8.8 - 10.4 mg/dL    Chloride 107 98 - 107 mmol/L    Glucose 128 (H) 70 - 99 mg/dL    Alkaline Phosphatase 514 (H) 40 - 150 U/L    AST 55 (H) 0 - 45 U/L    ALT 34 0 - 70 U/L    Protein Total 7.0 6.4 - 8.3 g/dL    Albumin 3.2 (L) 3.5 - 5.2 g/dL    Bilirubin Total 0.4 <=1.2 mg/dL   Magnesium    Collection Time: 03/27/25  8:01 AM   Result Value Ref Range    Magnesium 1.8 1.7 - 2.3 mg/dL   TSH with free T4 reflex    Collection Time: 03/27/25  8:01 AM   Result Value Ref Range    TSH 2.86 0.30 - 4.20 uIU/mL   CBC with platelets and differential    Collection Time: 03/27/25  8:01 AM   Result Value Ref Range    WBC Count 9.6 4.0 - 11.0 10e3/uL    RBC Count 3.30 (L) 4.40 - 5.90 10e6/uL    Hemoglobin 9.5 (L) 13.3 - 17.7 g/dL    Hematocrit 29.3 (L) 40.0 - 53.0 %    MCV 89 78 - 100 fL    MCH 28.8 26.5 - 33.0 pg    MCHC 32.4 31.5 - 36.5 g/dL    RDW 13.6 10.0 - 15.0 %    Platelet Count 279 150 - 450 10e3/uL    % Neutrophils 83 %    % Lymphocytes 7 %    % Monocytes 8 %    % Eosinophils 1 %    % Basophils 0 %    % Immature Granulocytes 1 %    NRBCs per 100 WBC 0 <1 /100    Absolute Neutrophils 7.9 1.6 - 8.3 10e3/uL    Absolute Lymphocytes 0.6 (L) 0.8 - 5.3 10e3/uL    Absolute Monocytes 0.8 0.0 - 1.3 10e3/uL    Absolute Eosinophils 0.1 0.0 - 0.7 10e3/uL    Absolute Basophils 0.0 0.0 - 0.2 10e3/uL    Absolute Immature Granulocytes 0.1 <=0.4 10e3/uL    Absolute NRBCs 0.0 10e3/uL         Imaging    PET Oncology (Eyes to Thighs)    Result Date: 3/12/2025  EXAM: PET ONCOLOGY (EYES TO THIGHS), CT CHEST W CONTRAST LOCATION: [facility] DATE: 3/12/2025 INDICATION: Abnormal findings on diagnostic imaging of liver and biliary tract. Carcinoma of unknown  origin liver biopsy. COMPARISON: CT abdomen pelvis 2/14/2025. CONTRAST: 100 mL Isovue-370, IV. TECHNIQUE: Serum glucose level 76 mg/dL. One hour post intravenous administration of 12.9mCi F18DG, PET imaging was performed from the skull vertex to mid thighs, utilizing attenuation correction with concurrent axial CT and PET/CT image fusion. Separate  diagnostic CT of the chest was performed. Dose reduction techniques were used. PET/CT FINDINGS: Large heterogeneous FDG avid liver metastases which measures 13.1 x 12.9 x 23.9 cm (SUV max 45.1) with several smaller FDG avid satellite lesions throughout the remaining liver parenchyma, suspicious for neoplasm. Associated FDG avid upper abdominal lymph nodes including gastrohepatic (SUV max 9.0), portacaval (SUV max 17.4) and retroperitoneal (SUV max 3.3), suspicious for zulema metastatic disease. FDG avid osseous lesion in the right iliac crest measuring 3.5 x 2.5 cm (SUV max 31.8) and subcentimeter lesion in the medial left clavicle (SUV max 3.5), suspicious for sites of osseous metastatic disease. CT FINDINGS: Mild senescent intracranial changes. Right chest wall port with tip in mid SVC. Mild to moderate coronary artery calcification. Few strands of scar/atelectasis in the lungs. There are few tiny pulmonary nodules (for example the right upper lobe measuring  3 mm series 8 image 31, periphery of the right upper lobe measuring 3 mm series 8 image 53, right lower lobe nodule measuring 6 mm series 8 image 72, and right lower lobe measuring 4 mm series 8 image 77), which are too small to characterize by PET/CT. Previously described cardiophrenic lymph nodes are not FDG avid above background levels. Intrahepatic biliary dilatation and narrowing of the portal vein adjacent to the dominant liver lesion. Right renal cyst. Additional scattered atherosclerotic calcifications. Small volume ascites. Enlarged prostate. Pelvic phleboliths. Multilevel degenerative changes of the spine.      IMPRESSION: 1.  FDG avid large dominant liver mass, additional satellite nodules in the liver, upper abdominal and retroperitoneal lymph nodes, and osseous lesions in the right iliac crest and left clavicle, suspicious for active neoplasm.. 2.  There are few tiny pulmonary nodules which are too small to characterize by PET/CT. Recommend continued attention on follow-up.     CT Chest w Contrast    Result Date: 3/12/2025  EXAM: PET ONCOLOGY (EYES TO THIGHS), CT CHEST W CONTRAST LOCATION: [facility] DATE: 3/12/2025 INDICATION: Abnormal findings on diagnostic imaging of liver and biliary tract. Carcinoma of unknown origin liver biopsy. COMPARISON: CT abdomen pelvis 2/14/2025. CONTRAST: 100 mL Isovue-370, IV. TECHNIQUE: Serum glucose level 76 mg/dL. One hour post intravenous administration of 12.9mCi F18DG, PET imaging was performed from the skull vertex to mid thighs, utilizing attenuation correction with concurrent axial CT and PET/CT image fusion. Separate  diagnostic CT of the chest was performed. Dose reduction techniques were used. PET/CT FINDINGS: Large heterogeneous FDG avid liver metastases which measures 13.1 x 12.9 x 23.9 cm (SUV max 45.1) with several smaller FDG avid satellite lesions throughout the remaining liver parenchyma, suspicious for neoplasm. Associated FDG avid upper abdominal lymph nodes including gastrohepatic (SUV max 9.0), portacaval (SUV max 17.4) and retroperitoneal (SUV max 3.3), suspicious for zulema metastatic disease. FDG avid osseous lesion in the right iliac crest measuring 3.5 x 2.5 cm (SUV max 31.8) and subcentimeter lesion in the medial left clavicle (SUV max 3.5), suspicious for sites of osseous metastatic disease. CT FINDINGS: Mild senescent intracranial changes. Right chest wall port with tip in mid SVC. Mild to moderate coronary artery calcification. Few strands of scar/atelectasis in the lungs. There are few tiny pulmonary nodules (for example the  right upper lobe measuring  3 mm series 8 image 31, periphery of the right upper lobe measuring 3 mm series 8 image 53, right lower lobe nodule measuring 6 mm series 8 image 72, and right lower lobe measuring 4 mm series 8 image 77), which are too small to characterize by PET/CT. Previously described cardiophrenic lymph nodes are not FDG avid above background levels. Intrahepatic biliary dilatation and narrowing of the portal vein adjacent to the dominant liver lesion. Right renal cyst. Additional scattered atherosclerotic calcifications. Small volume ascites. Enlarged prostate. Pelvic phleboliths. Multilevel degenerative changes of the spine.     IMPRESSION: 1.  FDG avid large dominant liver mass, additional satellite nodules in the liver, upper abdominal and retroperitoneal lymph nodes, and osseous lesions in the right iliac crest and left clavicle, suspicious for active neoplasm.. 2.  There are few tiny pulmonary nodules which are too small to characterize by PET/CT. Recommend continued attention on follow-up.     IR Chest Port Placement > 5 Yrs of Age    Result Date: 3/10/2025  [facility] LOCATION: [facility] DATE: 3/10/2025 PROCEDURE: IMPLANTABLE VENOUS CHEST PORT PLACEMENT (POWER INJECTABLE) INTERVENTIONAL RADIOLOGIST: Harinder Ramirez MD. INDICATION: Bile duct carcinoma. Chest port placement for initiation of therapy. CONSENT: The risks, benefits and alternatives of implantable venous chest port placement were discussed with the patient in detail. All questions were answered. Informed consent was given to proceed with the procedure. MODERATE SEDATION: Versed 1.5 mg IV; Fentanyl 75 mcg IV.  During the timeout, immediately prior to the administration of medications, the patient was reassessed for adequacy to receive conscious sedation. Under physician supervision, Versed and fentanyl were administered for moderate sedation. Pulse oximetry, heart rate and blood pressure were  continuously monitored by an independent trained observer. The physician spent 15 minutes of face-to-face sedation time with the patient. CONTRAST: None. ANTIBIOTICS: Ancef 2 g IV. ADDITIONAL MEDICATIONS: None. FLUOROSCOPIC TIME: 0.9 minutes. RADIATION DOSE: Air Kerma: 6 mGy. COMPLICATIONS: No immediate complications. STERILE BARRIER TECHNIQUE: Maximum sterile barrier technique was used. Cutaneous antisepsis was performed at the operative site with application of 2% chlorhexidine and large sterile drape. Prior to the procedure, the  and assistant performed hand hygiene and wore hat, mask, sterile gown, and sterile gloves during the entire procedure. PROCEDURE:  Using real-time ultrasound guidance the right internal jugular vein was accessed. A subcutaneous pocket was created and irrigated with sterile normal saline. The catheter tubing was tunneled in an antegrade fashion from the port pocket to the dermatotomy site. Over a guidewire, and under direct fluoroscopic visualization a peel-away sheath was advanced over the wire. Through the peel-away sheath, the catheter tubing was advanced until the tip was at the cavoatrial junction. The catheter tubing was cut to length and attached firmly to the port. The port was placed within the subcutaneous pocket and tested. The port pocket incision was closed with layered absorbable suture and surgical glue. The dermatotomy site was closed with surgical glue. FINDINGS: Ultrasound demonstrates an anechoic and compressible jugular vein. A permanent image was stored. At the completion of the study the port tip lies near the cavoatrial junction.     IMPRESSION:  1.  Successful implantable venous chest port placement as detailed above. 2.  The implantable venous chest port was placed under fluoroscopic guidance and is ready for use immediately.     US Biopsy Liver    Result Date: 2/26/2025  EXAM: 1. PERCUTANEOUS BIOPSY LIVER 2. ULTRASOUND GUIDANCE 3. CONSCIOUS SEDATION  LOCATION: [facility] DATE: 2/26/2025 INDICATION: Infiltrative hepatic mass and smaller satellite lesions, suspected primary liver biliary malignancy. PROCEDURE: Informed consent obtained. Site marked. Prior images reviewed. Required items made available. Patient identity was confirmed verbally and with arm band. Patient reevaluated immediately before administering sedation. Universal protocol was followed. Time out performed. The site was prepped and draped in sterile fashion. 10 mL of 1 percent lidocaine was infused into the local soft tissues. Using standard technique and under direct ultrasound guidance, a 18 gauge biopsy needle was used to make 5 core biopsies. Tissue was submitted to Pathology and adequate by preliminary review. The patient tolerated the procedure well. No complications. SEDATION: Versed 2 mg. Fentanyl 100 mcg. The procedure was performed with administration intravenous conscious sedation with appropriate preoperative, intraoperative, and postoperative evaluation. 33 minutes of supervised face to face conscious sedation time was provided by a radiology nurse under my direct supervision.     IMPRESSION: Status post US-guided biopsy of hepatic mass. Reference CPT Code: 32724, 63994, 58276, 40233         Assessment and Plan    Metastatic intrahepatic cholangiocarcinoma    Patient is here in follow-up.  He is here to start treatment.  We plan to treat him with some cisplatin gemcitabine and durvalumab.  Because of his kidney function his cisplatin dose will be decreased by 25%.  We will plan to follow kidney function weekly in the first cycle and keep a close eye on it.  The side effects of chemotherapy including but not limited to tiredness fatigue nausea vomiting diarrhea mucositis ototoxicity nephrotoxicity neutropenia neutropenic fever and sepsis were all explained to the patient in detail.  The side effects of immunotherapy were also discussed in detail.  She will get  his first dose of chemo today.  He will be back for day 8 chemotherapy and see our nurse practitioner for a symptom check.  I will see him in 3 weeks however he will have labs on day 15 also  So we can continue to monitor his kidney function and his blood counts.    Anorexia and nausea  Patient was complaining of anorexia and nausea he was started on ondansetron.  His nausea has been better and he has been eating more.    Signed by: Aylin Gaviria MD        CC: Derrick Mckeon MD

## 2025-03-30 ENCOUNTER — DOCUMENTATION ONLY (OUTPATIENT)
Dept: MULTI SPECIALTY CLINIC | Facility: CLINIC | Age: 75
End: 2025-03-30
Payer: COMMERCIAL

## 2025-03-30 DIAGNOSIS — K59.03 DRUG-INDUCED CONSTIPATION: Primary | ICD-10-CM

## 2025-03-30 RX ORDER — LACTULOSE 10 G/10G
20 SOLUTION ORAL 3 TIMES DAILY
Qty: 20 PACKET | Refills: 0 | Status: SHIPPED | OUTPATIENT
Start: 2025-03-30

## 2025-03-30 NOTE — PROGRESS NOTES
Call received from patient regarding significant constipation after starting Zofran.  He is taking senna twice daily and also using MiraLAX as needed.  Despite this has not had any bowel movement for a few days now.  I recommended him to stop taking Zofran and switch to Compazine for nausea.  Continue senna twice daily.  Due to his chronic kidney disease and reduced GFR, magnesium citrate is not generally recommended.  So I sent a prescription for lactulose 20 g, 3 times a day.

## 2025-03-31 ENCOUNTER — APPOINTMENT (OUTPATIENT)
Dept: CT IMAGING | Facility: CLINIC | Age: 75
End: 2025-03-31
Attending: EMERGENCY MEDICINE
Payer: COMMERCIAL

## 2025-03-31 ENCOUNTER — PATIENT OUTREACH (OUTPATIENT)
Dept: ONCOLOGY | Facility: HOSPITAL | Age: 75
End: 2025-03-31

## 2025-03-31 ENCOUNTER — HOSPITAL ENCOUNTER (EMERGENCY)
Facility: CLINIC | Age: 75
Discharge: HOME | End: 2025-03-31
Attending: EMERGENCY MEDICINE | Admitting: EMERGENCY MEDICINE
Payer: COMMERCIAL

## 2025-03-31 VITALS
HEART RATE: 92 BPM | SYSTOLIC BLOOD PRESSURE: 155 MMHG | OXYGEN SATURATION: 97 % | BODY MASS INDEX: 25.74 KG/M2 | TEMPERATURE: 98 F | RESPIRATION RATE: 18 BRPM | HEIGHT: 77 IN | WEIGHT: 218 LBS | DIASTOLIC BLOOD PRESSURE: 75 MMHG

## 2025-03-31 DIAGNOSIS — R10.84 GENERALIZED ABDOMINAL PAIN: ICD-10-CM

## 2025-03-31 DIAGNOSIS — K59.03 DRUG-INDUCED CONSTIPATION: ICD-10-CM

## 2025-03-31 DIAGNOSIS — C22.1 CHOLANGIOCARCINOMA (H): ICD-10-CM

## 2025-03-31 LAB
ALBUMIN SERPL BCG-MCNC: 3.1 G/DL (ref 3.5–5.2)
ALBUMIN UR-MCNC: 20 MG/DL
ALP SERPL-CCNC: 423 U/L (ref 40–150)
ALT SERPL W P-5'-P-CCNC: 97 U/L (ref 0–70)
ANION GAP SERPL CALCULATED.3IONS-SCNC: 10 MMOL/L (ref 7–15)
APPEARANCE UR: CLEAR
AST SERPL W P-5'-P-CCNC: 133 U/L (ref 0–45)
BASOPHILS # BLD AUTO: 0 10E3/UL (ref 0–0.2)
BASOPHILS NFR BLD AUTO: 0 %
BILIRUB SERPL-MCNC: 0.7 MG/DL
BILIRUB UR QL STRIP: NEGATIVE
BUN SERPL-MCNC: 38.8 MG/DL (ref 8–23)
CALCIUM SERPL-MCNC: 11.5 MG/DL (ref 8.8–10.4)
CHLORIDE SERPL-SCNC: 103 MMOL/L (ref 98–107)
COLOR UR AUTO: ABNORMAL
CREAT SERPL-MCNC: 1.69 MG/DL (ref 0.67–1.17)
EGFRCR SERPLBLD CKD-EPI 2021: 42 ML/MIN/1.73M2
EOSINOPHIL # BLD AUTO: 0 10E3/UL (ref 0–0.7)
EOSINOPHIL NFR BLD AUTO: 0 %
ERYTHROCYTE [DISTWIDTH] IN BLOOD BY AUTOMATED COUNT: 13.3 % (ref 10–15)
GLUCOSE SERPL-MCNC: 90 MG/DL (ref 70–99)
GLUCOSE UR STRIP-MCNC: NEGATIVE MG/DL
HCO3 SERPL-SCNC: 23 MMOL/L (ref 22–29)
HCT VFR BLD AUTO: 27.7 % (ref 40–53)
HGB BLD-MCNC: 9.3 G/DL (ref 13.3–17.7)
HGB UR QL STRIP: NEGATIVE
HOLD SPECIMEN: NORMAL
IMM GRANULOCYTES # BLD: 0.2 10E3/UL
IMM GRANULOCYTES NFR BLD: 2 %
KETONES UR STRIP-MCNC: NEGATIVE MG/DL
LEUKOCYTE ESTERASE UR QL STRIP: NEGATIVE
LYMPHOCYTES # BLD AUTO: 0.4 10E3/UL (ref 0.8–5.3)
LYMPHOCYTES NFR BLD AUTO: 3 %
MCH RBC QN AUTO: 29 PG (ref 26.5–33)
MCHC RBC AUTO-ENTMCNC: 33.6 G/DL (ref 31.5–36.5)
MCV RBC AUTO: 86 FL (ref 78–100)
MONOCYTES # BLD AUTO: 0.1 10E3/UL (ref 0–1.3)
MONOCYTES NFR BLD AUTO: 0 %
MUCOUS THREADS #/AREA URNS LPF: PRESENT /LPF
NEUTROPHILS # BLD AUTO: 12 10E3/UL (ref 1.6–8.3)
NEUTROPHILS NFR BLD AUTO: 94 %
NITRATE UR QL: NEGATIVE
NRBC # BLD AUTO: 0 10E3/UL
NRBC BLD AUTO-RTO: 0 /100
PH UR STRIP: 5 [PH] (ref 5–7)
PLATELET # BLD AUTO: 280 10E3/UL (ref 150–450)
POTASSIUM SERPL-SCNC: 4.1 MMOL/L (ref 3.4–5.3)
PROT SERPL-MCNC: 6.5 G/DL (ref 6.4–8.3)
RBC # BLD AUTO: 3.21 10E6/UL (ref 4.4–5.9)
RBC URINE: 1 /HPF
SODIUM SERPL-SCNC: 136 MMOL/L (ref 135–145)
SP GR UR STRIP: 1.02 (ref 1–1.03)
SQUAMOUS EPITHELIAL: <1 /HPF
UROBILINOGEN UR STRIP-MCNC: NORMAL MG/DL
WBC # BLD AUTO: 12.7 10E3/UL (ref 4–11)
WBC URINE: 1 /HPF

## 2025-03-31 PROCEDURE — 74176 CT ABD & PELVIS W/O CONTRAST: CPT

## 2025-03-31 PROCEDURE — 81003 URINALYSIS AUTO W/O SCOPE: CPT | Performed by: EMERGENCY MEDICINE

## 2025-03-31 PROCEDURE — 36415 COLL VENOUS BLD VENIPUNCTURE: CPT | Performed by: EMERGENCY MEDICINE

## 2025-03-31 PROCEDURE — 96360 HYDRATION IV INFUSION INIT: CPT

## 2025-03-31 PROCEDURE — 99284 EMERGENCY DEPT VISIT MOD MDM: CPT | Mod: 25

## 2025-03-31 PROCEDURE — 87086 URINE CULTURE/COLONY COUNT: CPT | Performed by: EMERGENCY MEDICINE

## 2025-03-31 PROCEDURE — 85018 HEMOGLOBIN: CPT | Performed by: EMERGENCY MEDICINE

## 2025-03-31 PROCEDURE — 85004 AUTOMATED DIFF WBC COUNT: CPT | Performed by: EMERGENCY MEDICINE

## 2025-03-31 PROCEDURE — 258N000003 HC RX IP 258 OP 636: Performed by: EMERGENCY MEDICINE

## 2025-03-31 PROCEDURE — 82040 ASSAY OF SERUM ALBUMIN: CPT | Performed by: EMERGENCY MEDICINE

## 2025-03-31 RX ORDER — ONDANSETRON 2 MG/ML
4 INJECTION INTRAMUSCULAR; INTRAVENOUS EVERY 30 MIN PRN
Status: DISCONTINUED | OUTPATIENT
Start: 2025-03-31 | End: 2025-03-31 | Stop reason: HOSPADM

## 2025-03-31 RX ADMIN — SODIUM CHLORIDE 1000 ML: 9 INJECTION, SOLUTION INTRAVENOUS at 05:56

## 2025-03-31 ASSESSMENT — ACTIVITIES OF DAILY LIVING (ADL)
ADLS_ACUITY_SCORE: 46
ADLS_ACUITY_SCORE: 46

## 2025-03-31 ASSESSMENT — COLUMBIA-SUICIDE SEVERITY RATING SCALE - C-SSRS
2. HAVE YOU ACTUALLY HAD ANY THOUGHTS OF KILLING YOURSELF IN THE PAST MONTH?: NO
6. HAVE YOU EVER DONE ANYTHING, STARTED TO DO ANYTHING, OR PREPARED TO DO ANYTHING TO END YOUR LIFE?: NO
1. IN THE PAST MONTH, HAVE YOU WISHED YOU WERE DEAD OR WISHED YOU COULD GO TO SLEEP AND NOT WAKE UP?: NO

## 2025-03-31 NOTE — ED NOTES
AIDET performed, white board updated for rounding. Patient updated on plan of care. Patient's pain assessed. Call light within reach, bed in low position, side rails up. Wife at bedside: _

## 2025-03-31 NOTE — ED TRIAGE NOTES
Patient presents to the ED complaining of being constipated for the last six days.  He has been trying multiple medications recommended per his doctor with no relief.  He also reports this morning he noticed blood in his urine.  He states he was walking well last night but this morning felt so weak from the pain that he lowered himself to the ground.  First chemotherapy session this past Thursday.       Triage Assessment (Adult)       Row Name 03/31/25 0525          Triage Assessment    Airway WDL WDL        Respiratory WDL    Respiratory WDL WDL        Skin Circulation/Temperature WDL    Skin Circulation/Temperature WDL WDL        Cardiac WDL    Cardiac WDL WDL        Peripheral/Neurovascular WDL    Peripheral Neurovascular WDL WDL        Cognitive/Neuro/Behavioral WDL    Cognitive/Neuro/Behavioral WDL WDL

## 2025-03-31 NOTE — ED PROVIDER NOTES
EMERGENCY DEPARTMENT ENCOUNTER      NAME: David Samaniego  AGE: 74 year old male  YOB: 1950  MRN: [id number]  EVALUATION DATE & TIME: 3/31/2025  5:24 AM    PCP: Derrick Mckeon    ED PROVIDER: Derrick Alex M.D.      Chief Complaint   Patient presents with    Constipation    Hematuria         FINAL IMPRESSION:  1. Drug-induced constipation    2. Generalized abdominal pain    3. Cholangiocarcinoma (H)          ED COURSE & MEDICAL DECISION MAKING:    Pertinent Labs & Imaging studies reviewed. (See chart for details)  74 year old male presents to the Emergency Department for evaluation of abdominal pain.  Patient has not had a bowel movement for about 6 days.  Recently started chemo for cholangiocarcinoma.  Did do a CT scan.  No signs of obstruction.  There is moderate stool burden.  I suspect this is the cause.  No change in his hepatic mass.  He does have elevated LFTs which are at baseline.  Creatinine is 1.69 which is actually slightly improved.  His white count is 12.7 and hemoglobin is 9.3.  No active signs of infection.  No indication for antibiotics.  Given he is undergoing active chemo I would not do enemas at this time.  Will have him try MiraLAX twice a day.  He will call his oncologist to discuss further treatments.  He will return for any worsening symptoms.    5:31 AM I met with the patient to gather history and to perform my initial exam. I discussed the plan for care while in the Emergency Department.       At the conclusion of the encounter I discussed the results of all of the tests and the disposition. The questions were answered. The patient or family acknowledged understanding and was agreeable with the care plan.     Medical Decision Making    Discharge. No recommendations on prescription strength medication(s). See documentation for any additional details.    MIPS (CTPE, Dental pain, Garcia, Sinusitis, Asthma/COPD, Head Trauma): Not Applicable    SEPSIS: None              MEDICATIONS GIVEN IN THE EMERGENCY:  Medications   ondansetron (ZOFRAN) injection 4 mg (has no administration in time range)   sodium chloride 0.9% BOLUS 1,000 mL (0 mLs Intravenous Stopped 3/31/25 0638)       NEW PRESCRIPTIONS STARTED AT TODAY'S ER VISIT  New Prescriptions    No medications on file          =================================================================    HPI    Patient information was obtained from: Patient and wife.     David Samaniego is a 74 year old male with a pertinent history of cholangiocarcinoma just starting chemo, prostate cancer status post TURP, hypertension, hypothyroid who presents to this ED for evaluation of abdominal pain.  Feels like he is constipated.  Has not had a bowel movement for 6 days.  Had his first dose of chemo on Thursday.  Feeling more weak.  No fevers.  Some nausea but no vomiting.  No fever.  Did have some hematuria tonight.  Has had some dysuria and frequency.        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     No Comments Provided    Enlarged prostate without lower urinary tract symptoms (luts)     No Comments Provided    Essential (primary) hypertension     No Comments Provided    Gastro-esophageal reflux disease without esophagitis     No Comments Provided    Hypothyroidism     No Comments Provided    Other specified congenital malformations of intestine     No Comments Provided    PONV (postoperative nausea and vomiting)     with surgeries in the 80's       PAST SURGICAL HISTORY:  Past Surgical History:   Procedure Laterality Date    BACK SURGERY      1986 and 1987    COLONOSCOPY      7/29/05,next due 2015    COLONOSCOPY  10/26/2015    Tubular adenoma,F/U 2020    COLONOSCOPY N/A 11/16/2020    F/U 2025 tubular adenomas    CYSTOSCOPY, TRANSURETHRAL RESECTION (TUR) PROSTATE, COMBINED N/A 07/30/2019     Procedure: CYSTOSCOPY, WITH TRANSURETHRAL RESECTION (TUR) PROSTATE;  Surgeon: Art Liu MD;  Location: [facility] OR    IR CHEST PORT PLACEMENT > 5 YRS OF AGE  3/10/2025    OTHER SURGICAL HISTORY      [id number],PARTIAL THYROIDECTOMY    OTHER SURGICAL HISTORY      [id number],ANESTHESIA ALERT,Notes no prior complications from anesthesia.:    [facility] TUR PROSTATE BIOPSIES             CURRENT MEDICATIONS:    Current Facility-Administered Medications   Medication Dose Route Frequency Provider Last Rate Last Admin    ondansetron (ZOFRAN) injection 4 mg  4 mg Intravenous Q30 Min PRN Derrick Alex MD         Current Outpatient Medications   Medication Sig Dispense Refill    acyclovir (ZOVIRAX) 400 MG tablet Take 1 tablet (400 mg) by mouth daily. 90 tablet 3    amLODIPine (NORVASC) 10 MG tablet Take 1 tablet (10 mg) by mouth daily. 90 tablet 3    chlorthalidone (HYGROTON) 25 MG tablet Take 1 tablet (25 mg) by mouth daily. 90 tablet 3    cholecalciferol (VITAMIN D3) 5000 units TABS tablet Take by mouth daily      dexAMETHasone (DECADRON) 4 MG tablet Take 2 tablets (8 mg) by mouth daily. Take for 3 days, starting the day after chemo on day 2 and 9. Take with food. If no nausea and vomiting with first cisplatin doses, may stop dexamethasone. 12 tablet 7    lactulose (CEPHULAC) 20 GM packet Take 1 packet (20 g) by mouth 3 times daily. 20 packet 0    levothyroxine (SYNTHROID/LEVOTHROID) 150 MCG tablet Take 1 tablet (150 mcg) by mouth daily (with breakfast). 90 tablet 3    losartan (COZAAR) 50 MG tablet Take 1 tablet (50 mg) by mouth daily. 90 tablet 3    omeprazole (PRILOSEC) 20 MG DR capsule Take 1 capsule (20 mg) by mouth 2 times daily. 180 capsule 3    ondansetron (ZOFRAN) 8 MG tablet Take 1 tablet (8 mg) by mouth every 8 hours as needed for nausea. 30 tablet 1    prochlorperazine (COMPAZINE) 10 MG tablet Take 1 tablet (10 mg) by mouth every 6 hours as needed for nausea or vomiting. 30 tablet 2         ALLERGIES:  No Known  Allergies    FAMILY HISTORY:  Family History   Problem Relation Age of Onset    Breast Cancer Mother         Cancer-breast,and progressed to brain cancer    Other - See Comments Mother         Stroke    Other - See Comments Father         Parkinson /dementia    Heart Disease No family hx of         Heart Disease    Diabetes No family hx of         Diabetes    Malignant Hyperthermia No family hx of     Anesthesia Reaction No family hx of        SOCIAL HISTORY:   Social History     Socioeconomic History    Marital status:    Tobacco Use    Smoking status: Never     Passive exposure: Never    Smokeless tobacco: Never   Vaping Use    Vaping status: Never Used   Substance and Sexual Activity    Alcohol use: Yes     Comment: Alcoholic Drinks/day: occasionally    Drug use: Never     Comment: Drug use: No    Sexual activity: Yes     Partners: Female   Other Topics Concern    Parent/sibling w/ CABG, MI or angioplasty before 65F 55M? No   Social History Narrative    Alcohol Use - yes        Works out 3 days per week.  Retired.    Preload 03/08/13     Social Drivers of Health     Financial Resource Strain: Low Risk  (4/9/2024)    Financial Resource Strain     Within the past 12 months, have you or your family members you live with been unable to get utilities (heat, electricity) when it was really needed?: No   Food Insecurity: Low Risk  (4/9/2024)    Food Insecurity     Within the past 12 months, did you worry that your food would run out before you got money to buy more?: No     Within the past 12 months, did the food you bought just not last and you didn t have money to get more?: No   Transportation Needs: Low Risk  (4/9/2024)    Transportation Needs     Within the past 12 months, has lack of transportation kept you from medical appointments, getting your medicines, non-medical meetings or appointments, work, or from getting things that you need?: No   Physical Activity: Sufficiently Active (4/9/2024)     "Exercise Vital Sign     Days of Exercise per Week: 3 days     Minutes of Exercise per Session: 50 min   Stress: Stress Concern Present (4/9/2024)    [employer] - Occupational Stress Questionnaire     Feeling of Stress : To some extent   Social Connections: Unknown (4/9/2024)    Social Connection and Isolation Panel [NHANES]     Frequency of Social Gatherings with Friends and Family: Once a week   Interpersonal Safety: Low Risk  (3/10/2025)    Interpersonal Safety     Do you feel physically and emotionally safe where you currently live?: Yes     Within the past 12 months, have you been hit, slapped, kicked or otherwise physically hurt by someone?: No     Within the past 12 months, have you been humiliated or emotionally abused in other ways by your partner or ex-partner?: No   Housing Stability: Low Risk  (4/9/2024)    Housing Stability     Do you have housing? : Yes     Are you worried about losing your housing?: No       VITALS:  BP (!) 160/74   Pulse 101   Temp 98.3  F (36.8  C) (Oral)   Resp 24   Ht 1.956 m (6' 5\")   Wt 98.9 kg (218 lb)   SpO2 95%   BMI 25.85 kg/m      PHYSICAL EXAM    Physical Exam  Vitals and nursing note reviewed.   Constitutional:       General: He is not in acute distress.     Appearance: He is not diaphoretic.   HENT:      Head: Atraumatic.   Eyes:      General: No scleral icterus.     Pupils: Pupils are equal, round, and reactive to light.   Cardiovascular:      Rate and Rhythm: Normal rate and regular rhythm.      Heart sounds: Normal heart sounds.   Pulmonary:      Effort: No respiratory distress.      Breath sounds: Normal breath sounds.   Abdominal:      General: There is distension.      Palpations: Abdomen is soft.      Tenderness: There is abdominal tenderness.   Musculoskeletal:         General: No tenderness.   Lymphadenopathy:      Cervical: No cervical adenopathy.   Skin:     General: Skin is warm.      Findings: No rash.           LAB:  All " pertinent labs reviewed and interpreted.  Labs Ordered and Resulted from Time of ED Arrival to Time of ED Departure   COMPREHENSIVE METABOLIC PANEL - Abnormal       Result Value    Sodium 136      Potassium 4.1      Carbon Dioxide (CO2) 23      Anion Gap 10      Urea Nitrogen 38.8 (*)     Creatinine 1.69 (*)     GFR Estimate 42 (*)     Calcium 11.5 (*)     Chloride 103      Glucose 90      Alkaline Phosphatase 423 (*)      (*)     ALT 97 (*)     Protein Total 6.5      Albumin 3.1 (*)     Bilirubin Total 0.7     ROUTINE UA WITH MICROSCOPIC REFLEX TO CULTURE - Abnormal    Color Urine Light Yellow      Appearance Urine Clear      Glucose Urine Negative      Bilirubin Urine Negative      Ketones Urine Negative      Specific Gravity Urine 1.018      Blood Urine Negative      pH Urine 5.0      Protein Albumin Urine 20 (*)     Urobilinogen Urine Normal      Nitrite Urine Negative      Leukocyte Esterase Urine Negative      Mucus Urine Present (*)     RBC Urine 1      WBC Urine 1      Squamous Epithelials Urine <1     CBC WITH PLATELETS AND DIFFERENTIAL - Abnormal    WBC Count 12.7 (*)     RBC Count 3.21 (*)     Hemoglobin 9.3 (*)     Hematocrit 27.7 (*)     MCV 86      MCH 29.0      MCHC 33.6      RDW 13.3      Platelet Count 280      % Neutrophils 94      % Lymphocytes 3      % Monocytes 0      % Eosinophils 0      % Basophils 0      % Immature Granulocytes 2      NRBCs per 100 WBC 0      Absolute Neutrophils 12.0 (*)     Absolute Lymphocytes 0.4 (*)     Absolute Monocytes 0.1      Absolute Eosinophils 0.0      Absolute Basophils 0.0      Absolute Immature Granulocytes 0.2      Absolute NRBCs 0.0     URINE CULTURE       RADIOLOGY:  Reviewed all pertinent imaging. Please see official radiology report.  CT Abdomen Pelvis w/o Contrast   Final Result   IMPRESSION:    1.  Large confluent central hepatic hypodense mass, interval confluence of multiple rounded lesions seen previously. The liver is enlarged, length of the  right lobe measures 28 cm. Foci of intrahepatic biliary ectasia attributable to confluent central    mass. Small amount of ascites adjacent to the liver, stable. Cholelithiasis. Moderate splenomegaly, more apparent on current study.      2.  Moderate amount of formed stool material within normal caliber colon. No mechanical obstruction or free gas.      3.  Right renal cortical cysts, these require no specific follow-up. No urinary tract calculi, hydronephrosis or hydroureter. Prostatomegaly bulging into the bladder base.      4.  Normal cardiac size. Coronary artery calcifications. Hypodensity of the cardiac ventricles suggests anemia. Atherosclerotic and mildly ectatic abdominal aorta. No aneurysm.      5.  Mild diffuse body wall edema. Degenerative changes involving the spine and joints of the pelvis.               Derrick Alex M.D.  Emergency Medicine  [facility] EMERGENCY ROOM  [address] [id number] [phone]  Dept: [phone]       Derrick Alex MD  03/31/25 0715

## 2025-03-31 NOTE — PROGRESS NOTES
[facility]: Cancer Care                                                                                          Cycle 1 Chemotherapy Follow-up Call     Date of First Chemotherapy Infusion: 3/27/25  Chemotherapy Drug/Regimen: Durvalumab / CISplatin / Gemcitabine then Maintenance Durvalumab       Initial Call (11:06 am):Call to Anuel to see how he is doing after his first chemotherapy infusion. Reviewed note from on-call oncologist, Dr. Glover and ED visit this morning (3/31/25).    Spoke to Anuel's wife, Sofia, who reports that Anuel is currently sleeping. Consent to communicate is on file. Call from Anuel to Dr. Glover on 3/30/25 regarding constipation. Dr. Glover ordered Lactulose TID. Sofia reports that last night Anuel was not been feeling well. Temperature was 99.5 and Anuel had shaking chills and weakness, had to lower himself to the ground. Presented to ED for evaluation of those symptoms and the constipation.     Anuel was discharged from the ED and was instructed to take double dose of miralax until Lactulose prescription is picked up. Per Sofia, [facility] had to order the lactulose and they are hoping to pick it up today.     Informed Sofia that this writer will return call later to see if Anuel is awake and able to talk. Confirmed with Sofia that they have clinic contact information for questions or concerns.           Update (2:11 pm): Call to Anuel to see how he is feeling post-chemotherapy infusion D1C1. Sofia, Anuel's wife reports that Anuel is taking another nap. Sofia reports she is going to [facility] to  Lactulose and they are pushing fluids. Asked Sofia to have Anuel clinic to touch base when/if he is available to talk. Also, encouraged Sofia to return call to clinic if they have any questions or concerns, or if Anuel develops worsening or changing symptoms. Sofia is agreeable to this plan.       Nhung Encarnacion RN  03/31/25  2:19 PM

## 2025-03-31 NOTE — ED NOTES
"BP (!) 155/75   Pulse 92   Temp 98  F (36.7  C)   Resp 18   Ht 1.956 m (6' 5\")   Wt 98.9 kg (218 lb)   SpO2 97%   BMI 25.85 kg/m      Education done regarding constipation and symptom reviewed. IV discharge family discharge summary give.     SUBJECTIVE:    "

## 2025-04-01 ENCOUNTER — PATIENT OUTREACH (OUTPATIENT)
Dept: ONCOLOGY | Facility: HOSPITAL | Age: 75
End: 2025-04-01
Payer: COMMERCIAL

## 2025-04-01 LAB — BACTERIA UR CULT: NO GROWTH

## 2025-04-01 NOTE — PROGRESS NOTES
"[facility]: Cancer Care                                                                                          Called Anuel to follow up to see how he is doing, he was in ER on 3/31  for constipation and lower abdominal pain. He had spoke with On Call Dr. Glover  on 3/30/25 regarding constipation that occurred after taking zofran . Per Dr. Glover's note, \" recommended him to stop taking Zofran and switch to Compazine for nausea.  Continue senna twice daily.  Due to his chronic kidney disease and reduced GFR, magnesium citrate is not generally recommended.  So I sent a prescription for lactulose 20 g, 3 times a day. \" Anuel  went to ER due to no bowel movement in 6 days, lactulose had not  arrived  at his pharmacy (they had to order it in).CT imaging done and showed moderate stool burden, no obstruction.  He has now taken 3 doses of lactulose and had had good results with this. He is taking compazine for persistent nausea and it is managing his symptoms fairly well. He has not had a good appetite with his constipation and has had low abdominal pain. He may still be constipated and will continue with bowel regimen. He notes that he has had increased leg weakness over the past 2-3 weeks that occurs more overnight hours. or after he has laid down for a couple of hours. In the past few days it has been  worse overnight He notes that his legs  can barely hold him up, and he  needs to hold onto things around him or have assistance from his wife. One night  he did fall one night onto his knees. During the day, once he is up an moving he has not had difficulty with  his weakness. Inquired if he is taking a new sleep aide and he  has taken 50 mg of benadryl overnight this week, in the past he has only taken 25 mg. Shared that this may be a contributor to his increased weakness overnight this week and encouraged backing off to see if this improves. He is not noticing any changes with his sensation or differences between " "his limbs. He has had back surgery in the past and has a history of not being able to lift his left leg as well, no changes with his baseline to LE. He has also noticed weakness in his upper extremities but not at the same level as his legs. He has not had a good appetite this week, and everything tastes \"burned\". Encouraged to try a wide variety of foods and flavors to find something that he is able to tolerate. Reminded him that food is also medicine and he needs to get his calories in. Encouraged adequate hydration as well, especially in light of recent constipation.    He has follow up  and C1D8 infusion  on 4/3 and will keep his apt as scheduled. He has our contact information and will call us if other concerns prior to that time.    Signature:  Patricia Mack RN  "

## 2025-04-02 ENCOUNTER — PATIENT OUTREACH (OUTPATIENT)
Dept: ONCOLOGY | Facility: HOSPITAL | Age: 75
End: 2025-04-02
Payer: COMMERCIAL

## 2025-04-02 DIAGNOSIS — C22.1 CHOLANGIOCARCINOMA (H): ICD-10-CM

## 2025-04-02 DIAGNOSIS — G95.20 CORD COMPRESSION SYNDROME (H): Primary | ICD-10-CM

## 2025-04-02 NOTE — PROGRESS NOTES
[facility] Hematology and Oncology Outpatient Progress Note    Patient: David Samaniego  MRN: [id number]  Date of Service: Apr 3, 2025          Reason for Visit    Chief Complaint   Patient presents with    Oncology Clinic Visit     Follow up -   Cholangiocarcinoma        Primary Hematologist/Oncologist: Dr. Aylin Gaviria      Assessment/Plan  #.  Metastatic intrahepatic cholangiocarcinoma  #.  Altered Renal Function  Started treatment for metastatic cholangiocarcinoma last week. He struggled with constipation, nausea and weakness. He was seen in the ED on 3/31 for constipation and abdominal pain. Since then he has been feeling better in terms of constipation and nausea. He has persistent leg weakness, and numbness to the bottom of his feet. This seems to be worsening. He will be evaluated with MRI tomorrow. No red flag symptoms, but discussed with patient what to watch for. Eating is lessened though working on small frequent meals. Drinking plenty of fluids. He is here for day 8 infusion. We reviewed labs today noting CBC showing stable Hgb of 9.0, platelet count of 148 K, and normal WBC. CMP shows stable renal function, noting creatinine of 1.66. Mg is 1.9, normal. Overall plan is to proceed with treatment and assess response following 2-3 cycles. Pt and family have questions today about what happens if he doesn't tolerate the treatment, and would like to keep the discussion open regarding treatment continuation. He does feel comfortable to proceed with treatment today.   Recommend to proceed with treatment today, C1d8 gemcitabine and cisplatin.   Cisplatin dose is decreased by 25% due to renal function.  We will plan to follow kidney function weekly in the first cycle and keep a close eye on it.    Labs on D15 to monitor kidney function.   Plan for restaging imaging after 2-3 cycles.  Follow-up in 2 weeks with Dr. Gaviria + labs for consideration of C2 infusions.     #.  Anorexia and nausea  #.   "Constipation  Patient was complaining of anorexia and nausea he was started on ondansetron but with constipation complications, this was stopped, and he has been using compazine. Nausea has been stable, as he switched back to Zofran after constipation was relieved. Food is still tasting bad. Constipation has been managed with lactulose and senna.   Continue Compazine for nausea  Continue Lactulose     #. Weakness  He has decreased benadryl use. His symptoms are persistent, and he thinks possibly more intense. Lumbar MRI ordered per Dr. Gaviria to rule out cord compression, which is planned for 4/4/25, tomorrow. Spouse requests wheelchair. Rx provided.     ______________________________________________________________________________    History of Present Illness/ Interval History    Mr. David Samaniego  is a 74 year old patient who is seen today for assessment and consideration of C1D8 infusion. He started treatment with Gemcitabine, Cisplatin, and Durvalumab on 3/27/25 with a decreased dose of cisplatin by 25% due to renal function.     He reports that the infusion went well. Since getting treatment last week, he has noticed some side effects of constipation, as well as increased nausea and weakness.     On 3/31 he was seen in the ER for constipation and lower abdominal pain. He was advised to stop Zofran, switch to Compazine, continuing Senna, and prescribed Lactulose (which was delayed at the pharmacy). ER did a CT that showed moderate stool burden, no obstruction. Anuel has since been taking Lactulose, 1 dose daily with senna, with good results. Zofran was better for nausea, so when constipation improved, he switched back to that. Both nausea and constipation are better managed. Less abdominal pain. Nausea is managed with Compazine, but he has low appetite and food tastes \"burned.\"     He also reports increased bilateral leg weakness over the past 2-3 weeks. One night, he fell onto his knees due to weakness. He " recently increased Benadryl (50 mg at night), which may be contributing. Pt was encouraged to reduce Benadryl, stay hydrated, maintain a bowel regimen, and trying different foods for better appetite. He did have a fall prior to ED visit and notes that he lacked the strength to help sit up. Overall this seems to be persistent and somewhat worse - with more intense weakness. He did have some incontinence on Monday. This was due to being unable to get to the bathroom quickly.  Feet feel numb and weak. No tingling or shooting pain. Planning for MRI tomorrow. Today he is feeling ongoing weakness. He is requesting a wheelchair.     He has had poor oral intake, as his appetite is low and food tastes burnt. He tolerates some foods, like eggs and jackie juice. He reports a slight cough that has been present for some time. It worsens after drinking, but he denies any dysphagia or trouble with food going down. Cough is dry and non productive. No choking.     ECOG performance status   3- Limited self care, <50% of the day up and about         Distress Screening (within last 30 days)    1. How concerned are you about your ability to eat? : 0  2. How concerned are you about unintended weight loss or your current weight? : 0  3. How concerned are you about feeling depressed or very sad? : 0  4. How concerned are you about feeling anxious or very scared? : 0  5. Do you struggle with the loss of meaning and chris in your life? : Not at all  6. How concerned are you about work and home life issues that may be affected by your cancer? : 0  7. How concerned are you about knowing what resources are available to help you? : 0  8. Do you currently have what you would describe as Islam or spiritual struggles?: Not at all      Pain  Pain Score: Severe Pain (8)  Pain Loc: Lower Leg    ROS  A 14 point review of systems was obtained.  Positive findings noted in the history.  The remainder of the review of system is otherwise  "negative.      Oncology History/Treatment  Oncologic history:  Intrahepatic cholangiocarcinoma stage IV with multifocal liver lesions, retroperitoneal and abdominal lymphadenopathy and osseous metastases.    Treatment:  Patient started on gemcitabine cisplatin durvalumab on 3/27/2025.  Dose of cisplatin decreased by 25% due to renal function      Physical Exam    /87 (BP Location: Right arm, Patient Position: Sitting, Cuff Size: Adult Regular)   Pulse 82   Temp 97.3  F (36.3  C) (Tympanic)   Resp 18   Ht 1.943 m (6' 4.5\")   Wt 95.9 kg (211 lb 8 oz)   SpO2 97%   BMI 25.41 kg/m      General: Well-appearing male in no acute distress. Cooperative in conversation.  Eyes: EOMI, PERRL. No scleral icterus.  ENT: Oral mucosa is moist without lesions or thrush. No ulcerations or mucositis noted.  Lymphatic: Neck is supple without cervical, axillary, or supraclavicular lymphadenopathy.   Cardiovascular: RRR No murmurs, gallops, or rubs. No peripheral edema.  Respiratory: CTA bilaterally. No wheezes or crackles.  Gastrointestinal: BS +. Abdomen soft, non-tender. No palpable hepatosplenomegaly or masses.   Neurologic: Alert and oriented x3. Cranial nerves II through XII are grossly intact.  Skin: No rashes, petechiae, or bruising noted. Warm, dry, intact.      Lab Results    Recent Results (from the past week)   Comprehensive metabolic panel   Result Value Ref Range    Sodium 136 135 - 145 mmol/L    Potassium 4.1 3.4 - 5.3 mmol/L    Carbon Dioxide (CO2) 23 22 - 29 mmol/L    Anion Gap 10 7 - 15 mmol/L    Urea Nitrogen 38.8 (H) 8.0 - 23.0 mg/dL    Creatinine 1.69 (H) 0.67 - 1.17 mg/dL    GFR Estimate 42 (L) >60 mL/min/1.73m2    Calcium 11.5 (H) 8.8 - 10.4 mg/dL    Chloride 103 98 - 107 mmol/L    Glucose 90 70 - 99 mg/dL    Alkaline Phosphatase 423 (H) 40 - 150 U/L     (H) 0 - 45 U/L    ALT 97 (H) 0 - 70 U/L    Protein Total 6.5 6.4 - 8.3 g/dL    Albumin 3.1 (L) 3.5 - 5.2 g/dL    Bilirubin Total 0.7 <=1.2 mg/dL "   CBC with platelets and differential   Result Value Ref Range    WBC Count 12.7 (H) 4.0 - 11.0 10e3/uL    RBC Count 3.21 (L) 4.40 - 5.90 10e6/uL    Hemoglobin 9.3 (L) 13.3 - 17.7 g/dL    Hematocrit 27.7 (L) 40.0 - 53.0 %    MCV 86 78 - 100 fL    MCH 29.0 26.5 - 33.0 pg    MCHC 33.6 31.5 - 36.5 g/dL    RDW 13.3 10.0 - 15.0 %    Platelet Count 280 150 - 450 10e3/uL    % Neutrophils 94 %    % Lymphocytes 3 %    % Monocytes 0 %    % Eosinophils 0 %    % Basophils 0 %    % Immature Granulocytes 2 %    NRBCs per 100 WBC 0 <1 /100    Absolute Neutrophils 12.0 (H) 1.6 - 8.3 10e3/uL    Absolute Lymphocytes 0.4 (L) 0.8 - 5.3 10e3/uL    Absolute Monocytes 0.1 0.0 - 1.3 10e3/uL    Absolute Eosinophils 0.0 0.0 - 0.7 10e3/uL    Absolute Basophils 0.0 0.0 - 0.2 10e3/uL    Absolute Immature Granulocytes 0.2 <=0.4 10e3/uL    Absolute NRBCs 0.0 10e3/uL   Extra Blood Culture Bottle   Result Value Ref Range    Hold Specimen JI    UA with Microscopic reflex to Culture    Specimen: Urine, Midstream   Result Value Ref Range    Color Urine Light Yellow Colorless, Straw, Light Yellow, Yellow    Appearance Urine Clear Clear    Glucose Urine Negative Negative mg/dL    Bilirubin Urine Negative Negative    Ketones Urine Negative Negative mg/dL    Specific Gravity Urine 1.018 1.001 - 1.030    Blood Urine Negative Negative    pH Urine 5.0 5.0 - 7.0    Protein Albumin Urine 20 (A) Negative mg/dL    Urobilinogen Urine Normal Normal mg/dL    Nitrite Urine Negative Negative    Leukocyte Esterase Urine Negative Negative    Mucus Urine Present (A) None Seen /LPF    RBC Urine 1 <=2 /HPF    WBC Urine 1 <=5 /HPF    Squamous Epithelials Urine <1 <=1 /HPF   Urine Culture    Specimen: Urine, Midstream   Result Value Ref Range    Culture No Growth    CBC with platelets and differential   Result Value Ref Range    WBC Count 10.5 4.0 - 11.0 10e3/uL    RBC Count 3.16 (L) 4.40 - 5.90 10e6/uL    Hemoglobin 9.0 (L) 13.3 - 17.7 g/dL    Hematocrit 27.0 (L) 40.0 -  53.0 %    MCV 85 78 - 100 fL    MCH 28.5 26.5 - 33.0 pg    MCHC 33.3 31.5 - 36.5 g/dL    RDW 13.0 10.0 - 15.0 %    Platelet Count 148 (L) 150 - 450 10e3/uL    % Neutrophils 89 %    % Lymphocytes 5 %    % Monocytes 4 %    % Eosinophils 0 %    % Basophils 0 %    % Immature Granulocytes 2 %    NRBCs per 100 WBC 0 <1 /100    Absolute Neutrophils 9.4 (H) 1.6 - 8.3 10e3/uL    Absolute Lymphocytes 0.5 (L) 0.8 - 5.3 10e3/uL    Absolute Monocytes 0.4 0.0 - 1.3 10e3/uL    Absolute Eosinophils 0.0 0.0 - 0.7 10e3/uL    Absolute Basophils 0.0 0.0 - 0.2 10e3/uL    Absolute Immature Granulocytes 0.2 <=0.4 10e3/uL    Absolute NRBCs 0.0 10e3/uL     I reviewed the above labs today.  Imaging    CT Abdomen Pelvis w/o Contrast    Result Date: 3/31/2025  EXAM: CT ABDOMEN PELVIS W/O CONTRAST LOCATION: [facility] DATE: 3/31/2025 INDICATION: Abdominal pain. History of cholangiocarcinoma. No bowel movement for 5 days. Small bowel obstruction. COMPARISON: 1. Whole-body PET/CT 3/12/2025. 2. CT abdomen and pelvis with IV contrast 2/14/2025. TECHNIQUE: CT scan of the abdomen and pelvis was performed without IV contrast. Multiplanar reformats were obtained. Dose reduction techniques were used. CONTRAST: None. FINDINGS: LOWER CHEST: A few strands of platelike atelectasis have developed in the lower lungs. No pleural effusion on either side. Normal cardiac size. Hypodensity of the cardiac ventricles suggests anemia. Coronary artery calcifications. Trace pericardial effusion. HEPATOBILIARY: Large confluent central hepatic hypodense mass, interval confluence of multiple rounded lesions seen on prior studies measuring a conglomerate 14.5 cm AP x 10 cm transversely x 12.7 cm CC dimension (image 70, series 3, image 51, series 5).  Intrahepatic segmental biliary ectasia due to central masses. The liver is enlarged, length of the right lobe measures 28 cm (image 45, series 5), previously 26 cm. Dependent calcified gallstones  without adjacent inflammatory changes. Small amount of ascites adjacent to the liver, unchanged. PANCREAS: Normal. SPLEEN: Enlarged without discrete lesion, AP length measures 17.6 cm (image 28, series 2), previously 14.3 cm. ADRENAL GLANDS: Normal. KIDNEYS/BLADDER: Right renal cortical cysts, no specific follow-up required. No urinary tract calculi. Both kidneys are negative for hydronephrosis or hydroureter. Partially distended urinary bladder. Prostatomegaly bulging into the bladder base. BOWEL: Moderate amount of formed stool material within normal caliber redundant colon, without mechanical obstruction or free gas. Small amount of ascites. LYMPH NODES: Shotty subcentimeter retroperitoneal nodes. VASCULATURE: Atherosclerotic and ectatic distal abdominal aorta measuring 2.4 x 2.4 cm (image 56, series 2). Normal caliber IVC. PELVIC ORGANS: Prostatomegaly. Small amount of ascites. Atherosclerotic changes. MUSCULOSKELETAL: Mild diffuse body wall edema. Degenerative changes involving the spine and joints of the pelvis.     IMPRESSION: 1.  Large confluent central hepatic hypodense mass, interval confluence of multiple rounded lesions seen previously. The liver is enlarged, length of the right lobe measures 28 cm. Foci of intrahepatic biliary ectasia attributable to confluent central mass. Small amount of ascites adjacent to the liver, stable. Cholelithiasis. Moderate splenomegaly, more apparent on current study. 2.  Moderate amount of formed stool material within normal caliber colon. No mechanical obstruction or free gas. 3.  Right renal cortical cysts, these require no specific follow-up. No urinary tract calculi, hydronephrosis or hydroureter. Prostatomegaly bulging into the bladder base. 4.  Normal cardiac size. Coronary artery calcifications. Hypodensity of the cardiac ventricles suggests anemia. Atherosclerotic and mildly ectatic abdominal aorta. No aneurysm. 5.  Mild diffuse body wall edema. Degenerative  changes involving the spine and joints of the pelvis.     PET Oncology (Eyes to Thighs)    Result Date: 3/12/2025  EXAM: PET ONCOLOGY (EYES TO THIGHS), CT CHEST W CONTRAST LOCATION: [facility] DATE: 3/12/2025 INDICATION: Abnormal findings on diagnostic imaging of liver and biliary tract. Carcinoma of unknown origin liver biopsy. COMPARISON: CT abdomen pelvis 2/14/2025. CONTRAST: 100 mL Isovue-370, IV. TECHNIQUE: Serum glucose level 76 mg/dL. One hour post intravenous administration of 12.9mCi F18DG, PET imaging was performed from the skull vertex to mid thighs, utilizing attenuation correction with concurrent axial CT and PET/CT image fusion. Separate  diagnostic CT of the chest was performed. Dose reduction techniques were used. PET/CT FINDINGS: Large heterogeneous FDG avid liver metastases which measures 13.1 x 12.9 x 23.9 cm (SUV max 45.1) with several smaller FDG avid satellite lesions throughout the remaining liver parenchyma, suspicious for neoplasm. Associated FDG avid upper abdominal lymph nodes including gastrohepatic (SUV max 9.0), portacaval (SUV max 17.4) and retroperitoneal (SUV max 3.3), suspicious for zulema metastatic disease. FDG avid osseous lesion in the right iliac crest measuring 3.5 x 2.5 cm (SUV max 31.8) and subcentimeter lesion in the medial left clavicle (SUV max 3.5), suspicious for sites of osseous metastatic disease. CT FINDINGS: Mild senescent intracranial changes. Right chest wall port with tip in mid SVC. Mild to moderate coronary artery calcification. Few strands of scar/atelectasis in the lungs. There are few tiny pulmonary nodules (for example the right upper lobe measuring  3 mm series 8 image 31, periphery of the right upper lobe measuring 3 mm series 8 image 53, right lower lobe nodule measuring 6 mm series 8 image 72, and right lower lobe measuring 4 mm series 8 image 77), which are too small to characterize by PET/CT. Previously described cardiophrenic  lymph nodes are not FDG avid above background levels. Intrahepatic biliary dilatation and narrowing of the portal vein adjacent to the dominant liver lesion. Right renal cyst. Additional scattered atherosclerotic calcifications. Small volume ascites. Enlarged prostate. Pelvic phleboliths. Multilevel degenerative changes of the spine.     IMPRESSION: 1.  FDG avid large dominant liver mass, additional satellite nodules in the liver, upper abdominal and retroperitoneal lymph nodes, and osseous lesions in the right iliac crest and left clavicle, suspicious for active neoplasm.. 2.  There are few tiny pulmonary nodules which are too small to characterize by PET/CT. Recommend continued attention on follow-up.     CT Chest w Contrast    Result Date: 3/12/2025  EXAM: PET ONCOLOGY (EYES TO THIGHS), CT CHEST W CONTRAST LOCATION: [facility] DATE: 3/12/2025 INDICATION: Abnormal findings on diagnostic imaging of liver and biliary tract. Carcinoma of unknown origin liver biopsy. COMPARISON: CT abdomen pelvis 2/14/2025. CONTRAST: 100 mL Isovue-370, IV. TECHNIQUE: Serum glucose level 76 mg/dL. One hour post intravenous administration of 12.9mCi F18DG, PET imaging was performed from the skull vertex to mid thighs, utilizing attenuation correction with concurrent axial CT and PET/CT image fusion. Separate  diagnostic CT of the chest was performed. Dose reduction techniques were used. PET/CT FINDINGS: Large heterogeneous FDG avid liver metastases which measures 13.1 x 12.9 x 23.9 cm (SUV max 45.1) with several smaller FDG avid satellite lesions throughout the remaining liver parenchyma, suspicious for neoplasm. Associated FDG avid upper abdominal lymph nodes including gastrohepatic (SUV max 9.0), portacaval (SUV max 17.4) and retroperitoneal (SUV max 3.3), suspicious for zulema metastatic disease. FDG avid osseous lesion in the right iliac crest measuring 3.5 x 2.5 cm (SUV max 31.8) and subcentimeter lesion in  the medial left clavicle (SUV max 3.5), suspicious for sites of osseous metastatic disease. CT FINDINGS: Mild senescent intracranial changes. Right chest wall port with tip in mid SVC. Mild to moderate coronary artery calcification. Few strands of scar/atelectasis in the lungs. There are few tiny pulmonary nodules (for example the right upper lobe measuring  3 mm series 8 image 31, periphery of the right upper lobe measuring 3 mm series 8 image 53, right lower lobe nodule measuring 6 mm series 8 image 72, and right lower lobe measuring 4 mm series 8 image 77), which are too small to characterize by PET/CT. Previously described cardiophrenic lymph nodes are not FDG avid above background levels. Intrahepatic biliary dilatation and narrowing of the portal vein adjacent to the dominant liver lesion. Right renal cyst. Additional scattered atherosclerotic calcifications. Small volume ascites. Enlarged prostate. Pelvic phleboliths. Multilevel degenerative changes of the spine.     IMPRESSION: 1.  FDG avid large dominant liver mass, additional satellite nodules in the liver, upper abdominal and retroperitoneal lymph nodes, and osseous lesions in the right iliac crest and left clavicle, suspicious for active neoplasm.. 2.  There are few tiny pulmonary nodules which are too small to characterize by PET/CT. Recommend continued attention on follow-up.     IR Chest Port Placement > 5 Yrs of Age    Result Date: 3/10/2025  [facility] LOCATION: [facility] DATE: 3/10/2025 PROCEDURE: IMPLANTABLE VENOUS CHEST PORT PLACEMENT (POWER INJECTABLE) INTERVENTIONAL RADIOLOGIST: Harinder Ramirez MD. INDICATION: Bile duct carcinoma. Chest port placement for initiation of therapy. CONSENT: The risks, benefits and alternatives of implantable venous chest port placement were discussed with the patient in detail. All questions were answered. Informed consent was given to proceed with the procedure. MODERATE SEDATION:  Versed 1.5 mg IV; Fentanyl 75 mcg IV.  During the timeout, immediately prior to the administration of medications, the patient was reassessed for adequacy to receive conscious sedation. Under physician supervision, Versed and fentanyl were administered for moderate sedation. Pulse oximetry, heart rate and blood pressure were continuously monitored by an independent trained observer. The physician spent 15 minutes of face-to-face sedation time with the patient. CONTRAST: None. ANTIBIOTICS: Ancef 2 g IV. ADDITIONAL MEDICATIONS: None. FLUOROSCOPIC TIME: 0.9 minutes. RADIATION DOSE: Air Kerma: 6 mGy. COMPLICATIONS: No immediate complications. STERILE BARRIER TECHNIQUE: Maximum sterile barrier technique was used. Cutaneous antisepsis was performed at the operative site with application of 2% chlorhexidine and large sterile drape. Prior to the procedure, the  and assistant performed hand hygiene and wore hat, mask, sterile gown, and sterile gloves during the entire procedure. PROCEDURE:  Using real-time ultrasound guidance the right internal jugular vein was accessed. A subcutaneous pocket was created and irrigated with sterile normal saline. The catheter tubing was tunneled in an antegrade fashion from the port pocket to the dermatotomy site. Over a guidewire, and under direct fluoroscopic visualization a peel-away sheath was advanced over the wire. Through the peel-away sheath, the catheter tubing was advanced until the tip was at the cavoatrial junction. The catheter tubing was cut to length and attached firmly to the port. The port was placed within the subcutaneous pocket and tested. The port pocket incision was closed with layered absorbable suture and surgical glue. The dermatotomy site was closed with surgical glue. FINDINGS: Ultrasound demonstrates an anechoic and compressible jugular vein. A permanent image was stored. At the completion of the study the port tip lies near the cavoatrial junction.      IMPRESSION:  1.  Successful implantable venous chest port placement as detailed above. 2.  The implantable venous chest port was placed under fluoroscopic guidance and is ready for use immediately.        Billing  Total time 65 minutes, to include face to face visit, review of EMR, ordering, documentation and coordination of care on date of service. The longitudinal plan of care for the diagnosis(es)/condition(s) as documented were addressed during this visit. Due to the added complexity in care, I will continue to support Anuel in the subsequent management and with ongoing continuity of care.    Signed by: CODI Sharma CNP        Chart documentation with Dragon Voice recognition Software. Although reviewed after completion, some words and grammatical errors may remain.

## 2025-04-02 NOTE — PROGRESS NOTES
[facility]: Cancer Care                                                                                          Called Anuel to follow up on the conversation that we had on 4/1/25 about his symptoms. With his lower extremity weakness, Dr. Gaviria ordered a lumbar MRI to rule out cord compression, explained what that was. He is flexible with what site he would be able to go to for his imaging. He was warm transferred to central scheduling to set up  his MRI. Writer will monitor for scheduling and future results. He has clinic visit/infusion on 4/3 and keep his apt as scheduled.  MRI is scheduled on 4/4/25 0730  He is aware that if he has loss of bladder of bowel control, severe, increasing numbness between the legs, inner thighs, and back of the legs or Severe pain and weakness that is spreading into one or both legs he should seek immediate care at ER . He states understanding.    Signature:  Patricia Mack RN

## 2025-04-03 ENCOUNTER — INFUSION THERAPY VISIT (OUTPATIENT)
Dept: INFUSION THERAPY | Facility: HOSPITAL | Age: 75
End: 2025-04-03
Attending: INTERNAL MEDICINE
Payer: COMMERCIAL

## 2025-04-03 ENCOUNTER — OFFICE VISIT (OUTPATIENT)
Dept: ONCOLOGY | Facility: HOSPITAL | Age: 75
End: 2025-04-03
Attending: INTERNAL MEDICINE
Payer: COMMERCIAL

## 2025-04-03 VITALS
TEMPERATURE: 97.3 F | BODY MASS INDEX: 24.97 KG/M2 | HEIGHT: 77 IN | OXYGEN SATURATION: 97 % | DIASTOLIC BLOOD PRESSURE: 87 MMHG | SYSTOLIC BLOOD PRESSURE: 128 MMHG | HEART RATE: 82 BPM | WEIGHT: 211.5 LBS | RESPIRATION RATE: 18 BRPM

## 2025-04-03 VITALS
RESPIRATION RATE: 18 BRPM | DIASTOLIC BLOOD PRESSURE: 87 MMHG | OXYGEN SATURATION: 97 % | SYSTOLIC BLOOD PRESSURE: 128 MMHG | TEMPERATURE: 97.3 F | HEART RATE: 82 BPM

## 2025-04-03 DIAGNOSIS — C22.1 CHOLANGIOCARCINOMA (H): Primary | ICD-10-CM

## 2025-04-03 DIAGNOSIS — C77.2 ADENOCARCINOMA METASTATIC TO INTRA-ABDOMINAL LYMPH NODE (H): ICD-10-CM

## 2025-04-03 DIAGNOSIS — T45.1X5A CHEMOTHERAPY INDUCED NAUSEA AND VOMITING: ICD-10-CM

## 2025-04-03 DIAGNOSIS — C22.1 CHOLANGIOCARCINOMA (H): ICD-10-CM

## 2025-04-03 DIAGNOSIS — K59.03 DRUG-INDUCED CONSTIPATION: ICD-10-CM

## 2025-04-03 DIAGNOSIS — M62.81 GENERALIZED MUSCLE WEAKNESS: ICD-10-CM

## 2025-04-03 DIAGNOSIS — R11.2 CHEMOTHERAPY INDUCED NAUSEA AND VOMITING: ICD-10-CM

## 2025-04-03 DIAGNOSIS — R63.0 ANOREXIA: ICD-10-CM

## 2025-04-03 DIAGNOSIS — M62.81 MUSCLE WEAKNESS (GENERALIZED): ICD-10-CM

## 2025-04-03 LAB
ALBUMIN SERPL BCG-MCNC: 3 G/DL (ref 3.5–5.2)
ALP SERPL-CCNC: 468 U/L (ref 40–150)
ALT SERPL W P-5'-P-CCNC: 41 U/L (ref 0–70)
ANION GAP SERPL CALCULATED.3IONS-SCNC: 12 MMOL/L (ref 7–15)
AST SERPL W P-5'-P-CCNC: 41 U/L (ref 0–45)
BASOPHILS # BLD AUTO: 0 10E3/UL (ref 0–0.2)
BASOPHILS NFR BLD AUTO: 0 %
BILIRUB SERPL-MCNC: 0.6 MG/DL
BUN SERPL-MCNC: 33.2 MG/DL (ref 8–23)
CALCIUM SERPL-MCNC: 12.2 MG/DL (ref 8.8–10.4)
CHLORIDE SERPL-SCNC: 102 MMOL/L (ref 98–107)
CREAT SERPL-MCNC: 1.66 MG/DL (ref 0.67–1.17)
EGFRCR SERPLBLD CKD-EPI 2021: 42 ML/MIN/1.73M2
EOSINOPHIL # BLD AUTO: 0 10E3/UL (ref 0–0.7)
EOSINOPHIL NFR BLD AUTO: 0 %
ERYTHROCYTE [DISTWIDTH] IN BLOOD BY AUTOMATED COUNT: 13 % (ref 10–15)
GLUCOSE SERPL-MCNC: 101 MG/DL (ref 70–99)
HCO3 SERPL-SCNC: 22 MMOL/L (ref 22–29)
HCT VFR BLD AUTO: 27 % (ref 40–53)
HGB BLD-MCNC: 9 G/DL (ref 13.3–17.7)
IMM GRANULOCYTES # BLD: 0.2 10E3/UL
IMM GRANULOCYTES NFR BLD: 2 %
LYMPHOCYTES # BLD AUTO: 0.5 10E3/UL (ref 0.8–5.3)
LYMPHOCYTES NFR BLD AUTO: 5 %
MAGNESIUM SERPL-MCNC: 1.9 MG/DL (ref 1.7–2.3)
MCH RBC QN AUTO: 28.5 PG (ref 26.5–33)
MCHC RBC AUTO-ENTMCNC: 33.3 G/DL (ref 31.5–36.5)
MCV RBC AUTO: 85 FL (ref 78–100)
MONOCYTES # BLD AUTO: 0.4 10E3/UL (ref 0–1.3)
MONOCYTES NFR BLD AUTO: 4 %
NEUTROPHILS # BLD AUTO: 9.4 10E3/UL (ref 1.6–8.3)
NEUTROPHILS NFR BLD AUTO: 89 %
NRBC # BLD AUTO: 0 10E3/UL
NRBC BLD AUTO-RTO: 0 /100
PLATELET # BLD AUTO: 148 10E3/UL (ref 150–450)
POTASSIUM SERPL-SCNC: 3.9 MMOL/L (ref 3.4–5.3)
PROT SERPL-MCNC: 6.5 G/DL (ref 6.4–8.3)
RBC # BLD AUTO: 3.16 10E6/UL (ref 4.4–5.9)
SODIUM SERPL-SCNC: 136 MMOL/L (ref 135–145)
WBC # BLD AUTO: 10.5 10E3/UL (ref 4–11)

## 2025-04-03 PROCEDURE — G0463 HOSPITAL OUTPT CLINIC VISIT: HCPCS

## 2025-04-03 PROCEDURE — 36591 DRAW BLOOD OFF VENOUS DEVICE: CPT | Performed by: INTERNAL MEDICINE

## 2025-04-03 PROCEDURE — 258N000003 HC RX IP 258 OP 636: Performed by: INTERNAL MEDICINE

## 2025-04-03 PROCEDURE — 250N000011 HC RX IP 250 OP 636: Mod: JZ | Performed by: INTERNAL MEDICINE

## 2025-04-03 PROCEDURE — 97802 MEDICAL NUTRITION INDIV IN: CPT | Performed by: DIETITIAN, REGISTERED

## 2025-04-03 PROCEDURE — 85004 AUTOMATED DIFF WBC COUNT: CPT | Performed by: INTERNAL MEDICINE

## 2025-04-03 PROCEDURE — 85018 HEMOGLOBIN: CPT | Performed by: INTERNAL MEDICINE

## 2025-04-03 PROCEDURE — 83735 ASSAY OF MAGNESIUM: CPT | Performed by: INTERNAL MEDICINE

## 2025-04-03 PROCEDURE — 80053 COMPREHEN METABOLIC PANEL: CPT | Performed by: INTERNAL MEDICINE

## 2025-04-03 RX ORDER — METHYLPREDNISOLONE SODIUM SUCCINATE 40 MG/ML
40 INJECTION INTRAMUSCULAR; INTRAVENOUS
Status: DISCONTINUED | OUTPATIENT
Start: 2025-04-03 | End: 2025-04-03 | Stop reason: HOSPADM

## 2025-04-03 RX ORDER — ALBUTEROL SULFATE 0.83 MG/ML
2.5 SOLUTION RESPIRATORY (INHALATION)
Status: DISCONTINUED | OUTPATIENT
Start: 2025-04-03 | End: 2025-04-03 | Stop reason: HOSPADM

## 2025-04-03 RX ORDER — ALBUTEROL SULFATE 90 UG/1
1-2 INHALANT RESPIRATORY (INHALATION)
Status: DISCONTINUED | OUTPATIENT
Start: 2025-04-03 | End: 2025-04-03 | Stop reason: HOSPADM

## 2025-04-03 RX ORDER — PALONOSETRON 0.05 MG/ML
0.25 INJECTION, SOLUTION INTRAVENOUS ONCE
Status: COMPLETED | OUTPATIENT
Start: 2025-04-03 | End: 2025-04-03

## 2025-04-03 RX ORDER — DIPHENHYDRAMINE HYDROCHLORIDE 50 MG/ML
50 INJECTION, SOLUTION INTRAMUSCULAR; INTRAVENOUS
Status: DISCONTINUED | OUTPATIENT
Start: 2025-04-03 | End: 2025-04-03 | Stop reason: HOSPADM

## 2025-04-03 RX ORDER — MEPERIDINE HYDROCHLORIDE 25 MG/ML
25 INJECTION INTRAMUSCULAR; INTRAVENOUS; SUBCUTANEOUS
Status: DISCONTINUED | OUTPATIENT
Start: 2025-04-03 | End: 2025-04-03 | Stop reason: HOSPADM

## 2025-04-03 RX ORDER — DIPHENHYDRAMINE HYDROCHLORIDE 50 MG/ML
25 INJECTION, SOLUTION INTRAMUSCULAR; INTRAVENOUS
Status: DISCONTINUED | OUTPATIENT
Start: 2025-04-03 | End: 2025-04-03 | Stop reason: HOSPADM

## 2025-04-03 RX ORDER — EPINEPHRINE 1 MG/ML
0.3 INJECTION, SOLUTION INTRAMUSCULAR; SUBCUTANEOUS EVERY 5 MIN PRN
Status: DISCONTINUED | OUTPATIENT
Start: 2025-04-03 | End: 2025-04-03 | Stop reason: HOSPADM

## 2025-04-03 RX ORDER — HEPARIN SODIUM (PORCINE) LOCK FLUSH IV SOLN 100 UNIT/ML 100 UNIT/ML
5 SOLUTION INTRAVENOUS
Status: DISCONTINUED | OUTPATIENT
Start: 2025-04-03 | End: 2025-04-03 | Stop reason: HOSPADM

## 2025-04-03 RX ADMIN — GEMCITABINE 2200 MG: 38 INJECTION, SOLUTION INTRAVENOUS at 13:07

## 2025-04-03 RX ADMIN — SODIUM CHLORIDE 250 ML: 0.9 INJECTION, SOLUTION INTRAVENOUS at 12:44

## 2025-04-03 RX ADMIN — DEXAMETHASONE SODIUM PHOSPHATE: 100 INJECTION INTRAMUSCULAR; INTRAVENOUS at 12:34

## 2025-04-03 RX ADMIN — CISPLATIN 43 MG: 1 INJECTION, SOLUTION INTRAVENOUS at 13:43

## 2025-04-03 RX ADMIN — PALONOSETRON 0.25 MG: 0.05 INJECTION, SOLUTION INTRAVENOUS at 12:33

## 2025-04-03 RX ADMIN — SODIUM CHLORIDE 1000 ML: 0.9 INJECTION, SOLUTION INTRAVENOUS at 12:43

## 2025-04-03 RX ADMIN — HEPARIN 5 ML: 100 SYRINGE at 14:45

## 2025-04-03 ASSESSMENT — PAIN SCALES - GENERAL: PAINLEVEL_OUTOF10: SEVERE PAIN (8)

## 2025-04-03 NOTE — PROGRESS NOTES
"CLINICAL NUTRITION SERVICES - ASSESSMENT NOTE    David Samaniego 74 year old referred for MNT related to cholangiocarcinoma     Time Spent: 30 minutes  Visit Type: initial  Pt accompanied by: Rin (wife) and Alec (daughter)  Referring Physician: Dr. Gaviria    NUTRITION HISTORY  Factors affecting nutrition intake include: constipation (improved), decreased appetite, nausea, and taste aversions  Current diet/appetite: regular diet/ fair appetite  Chemotherapy: ongoing  Radiation: n/a    Diet Recall  Anuel is typically eating 3 small meals/ day. Breakfast is cream of wheat with butter. He is also eating plan chicken, eggs, baked potatoes, corn, peas, and rice krispies with milk. He is drinking water, jackie juice, coffee, and premier protein. He usually has about 1/2 premier protein/ day. He notes that taste is his biggest barrier to eating well. Many foods do not taste good.     Treatment Plan:  Oncology History   Cholangiocarcinoma (H)   3/24/2025 Initial Diagnosis    Cholangiocarcinoma (H)     3/27/2025 -  Chemotherapy    OP ONC Biliary Tract Cancer - Durvalumab / CISplatin / Gemcitabine then Maintenance Durvalumab  Plan Provider: Rg Gaviria MD  Treatment goal: Palliative  Line of treatment: First Line       Treatment plan has been reviewed.    ANTHROPOMETRICS  Height:   Ht Readings from Last 1 Encounters:   04/03/25 1.943 m (6' 4.5\")     Weight:   Wt Readings from Last 1 Encounters:   04/03/25 95.9 kg (211 lb 8 oz)     BMI: 25.41 kg/m2  Weight Status:  Overweight BMI 25-29.9  IBW: 205 lbs/ 93.2 kg (103%)  Weight History: Anuel reports historical baseline weight of 220 lbs. Weight has recently trended down. Noted wt loss of 3 kg (3% in the past 2 months).  Wt Readings from Last 10 Encounters:   04/03/25 95.9 kg (211 lb 8 oz)   03/31/25 98.9 kg (218 lb)   03/27/25 98.7 kg (217 lb 11.2 oz)   03/24/25 95.3 kg (210 lb)   03/05/25 97.5 kg (215 lb)   02/05/25 98.9 kg (218 lb)   04/16/24 98.9 kg (218 lb) "   04/01/24 100.7 kg (222 lb 1.6 oz)   09/15/23 100.2 kg (221 lb)   04/13/23 99.4 kg (219 lb 3.2 oz)       Dosing Weight: 96 kg    Medications/vitamins/minerals/herbals:   Reviewed    Labs:   Labs reviewed    ASSESSED NUTRITION NEEDS:  Estimated Energy Needs: 8483-4252 kcals (25-30 Kcal/Kg)  Justification: maintenance  Estimated Protein Needs: 77-96 grams protein (0.8-1 g pro/Kg)  Justification: maintenance (kidney function but receiving chemo)  Estimated Fluid Needs: 6703-0770 mL   Justification: maintenance    MALNUTRITION:  % Weight Loss:  Weight loss does not meet criteria for malnutrition (3% in 2 months)  % Intake:  <75% for >/= 1 month (moderate malnutrition)  Subcutaneous Fat Loss:  None observed  Muscle Loss:  None observed  Fluid Retention:  None noted    Malnutrition Diagnosis: Patient does not meet two of the above criteria necessary for diagnosing malnutrition    NUTRITION DIAGNOSIS:  Inadequate oral intake related to decreased ability to consume sufficient energy due to side effects of cancer therapy as evidenced by 3% wt loss x past 2 months, dietary intake 50-75% estimated needs.      INTERVENTIONS  Provided written & verbal education:     - Discussed nutrition and hydration needs. Encouraged pt to aim for at least 2500 kcal and 80 g protein. Encouraged fluid intake to support hydration.  - Discussed strategies to help fortify meals and snacks. Encouraged to focus on small, frequent meals.    - Reviewed sources of protein. Encouraged to have a protein source with each meal and snack.    - Reviewed common barriers to eating with cancer treatment.  Discussed ways to cope with low appetite and taste changes.   - Reviewed high calorie/high protein oral nutrition supplement options (Ensure Complete, Ensure Plus/Boost Plus, Boost VHC etc).   - Encouraged utilizing these ONS in home made shakes/smoothies to prevent flavor fatigue.      Provided pt with corresponding education materials/handouts on:  High  Calorie High Protein Nutrition Therapy, High Calorie High Protein Recipes, Six Small Meals, Maximizing Nutrition During Cancer Treatment, High Calorie High Protein Beverage Recipes, Coping with Nausea and Vomiting, Coping with Constipation    Pt verbalize understanding of materials provided during consult.   Patient Understanding: Excellent  Expected patient engagement: Excellent    Goals  1. Aim for 5-6 small frequent meals  2. Aim for 2400 kcal and 80 g protein, 10 cups fluids/day  3. Weight maintenance     Follow-Up Plans: Pt has RD contact information for questions.      MONITORING AND EVALUATION:  -Food/beverage intake  -Weight trends      Queenie Velásquez, MS, RD, LD

## 2025-04-03 NOTE — PROGRESS NOTES
"Oncology Rooming Note    April 3, 2025 10:53 AM   David Samaniego is a 74 year old male who presents for:    Chief Complaint   Patient presents with    Oncology Clinic Visit     Follow up -   Cholangiocarcinoma      Initial Vitals: /87 (BP Location: Right arm, Patient Position: Sitting, Cuff Size: Adult Regular)   Pulse 82   Temp 97.3  F (36.3  C) (Tympanic)   Resp 18   Ht 1.943 m (6' 4.5\")   Wt 95.9 kg (211 lb 8 oz)   SpO2 97%   BMI 25.41 kg/m   Estimated body mass index is 25.41 kg/m  as calculated from the following:    Height as of this encounter: 1.943 m (6' 4.5\").    Weight as of this encounter: 95.9 kg (211 lb 8 oz). Body surface area is 2.28 meters squared.  Severe Pain (8) Comment: Data Unavailable   No LMP for male patient.  Allergies reviewed: Yes  Medications reviewed: Yes    Medications: Medication refills not needed today.  Pharmacy name entered into [employer]:    [facility] [address] [id number] - [place], [place] - [facility] [facility] AT [facility] [address] & [facility]    Frailty Screening:   Is the patient here for a new oncology consult visit in cancer care? 2. No    PHQ9:  Did this patient require a PHQ9?: No      Clinical concerns:   Follow up.   Questions regarding medications - Anti nausea and constipation side effect interaction.   Cough, chest fullness. Multiple concerns.     Farzaneh Alarcon MA              "

## 2025-04-03 NOTE — LETTER
4/3/2025      David Samaniego  [id number] [address] [address]      Dear Colleague,    Thank you for referring your patient, David Samaniego, to the [facility]. Please see a copy of my visit note below.    [facility] Hematology and Oncology Outpatient Progress Note    Patient: David Samaniego  MRN: [id number]  Date of Service: Apr 3, 2025          Reason for Visit    Chief Complaint   Patient presents with     Oncology Clinic Visit     Follow up -   Cholangiocarcinoma        Primary Hematologist/Oncologist: Dr. Aylin Gaviria      Assessment/Plan  #.  Metastatic intrahepatic cholangiocarcinoma  #.  Altered Renal Function  Started treatment for metastatic cholangiocarcinoma last week. He struggled with constipation, nausea and weakness. He was seen in the ED on 3/31 for constipation and abdominal pain. Since then he has been feeling better in terms of constipation and nausea. He has persistent leg weakness, and numbness to the bottom of his feet. This seems to be worsening. He will be evaluated with MRI tomorrow. No red flag symptoms, but discussed with patient what to watch for. Eating is lessened though working on small frequent meals. Drinking plenty of fluids. He is here for day 8 infusion. We reviewed labs today noting CBC showing stable Hgb of 9.0, platelet count of 148 K, and normal WBC. CMP shows stable renal function, noting creatinine of 1.66. Mg is 1.9, normal. Overall plan is to proceed with treatment and assess response following 2-3 cycles. Pt and family have questions today about what happens if he doesn't tolerate the treatment, and would like to keep the discussion open regarding treatment continuation. He does feel comfortable to proceed with treatment today.   Recommend to proceed with treatment today, C1d8 gemcitabine and cisplatin.   Cisplatin dose is decreased by 25% due to renal function.  We will plan to follow kidney function weekly in the first cycle and  keep a close eye on it.    Labs on D15 to monitor kidney function.   Plan for restaging imaging after 2-3 cycles.  Follow-up in 2 weeks with Dr. Gaviria + labs for consideration of C2 infusions.     #.  Anorexia and nausea  #.  Constipation  Patient was complaining of anorexia and nausea he was started on ondansetron but with constipation complications, this was stopped, and he has been using compazine. Nausea has been stable, as he switched back to Zofran after constipation was relieved. Food is still tasting bad. Constipation has been managed with lactulose and senna.   Continue Compazine for nausea  Continue Lactulose     #. Weakness  He has decreased benadryl use. His symptoms are persistent, and he thinks possibly more intense. Lumbar MRI ordered per Dr. Gaviria to rule out cord compression, which is planned for 4/4/25, tomorrow. Spouse requests wheelchair. Rx provided.     ______________________________________________________________________________    History of Present Illness/ Interval History    Mr. David Samaniego  is a 74 year old patient who is seen today for assessment and consideration of C1D8 infusion. He started treatment with Gemcitabine, Cisplatin, and Durvalumab on 3/27/25 with a decreased dose of cisplatin by 25% due to renal function.     He reports that the infusion went well. Since getting treatment last week, he has noticed some side effects of constipation, as well as increased nausea and weakness.     On 3/31 he was seen in the ER for constipation and lower abdominal pain. He was advised to stop Zofran, switch to Compazine, continuing Senna, and prescribed Lactulose (which was delayed at the pharmacy). ER did a CT that showed moderate stool burden, no obstruction. Anuel has since been taking Lactulose, 1 dose daily with senna, with good results. Zofran was better for nausea, so when constipation improved, he switched back to that. Both nausea and constipation are better managed. Less abdominal  "pain. Nausea is managed with Compazine, but he has low appetite and food tastes \"burned.\"     He also reports increased bilateral leg weakness over the past 2-3 weeks. One night, he fell onto his knees due to weakness. He recently increased Benadryl (50 mg at night), which may be contributing. Pt was encouraged to reduce Benadryl, stay hydrated, maintain a bowel regimen, and trying different foods for better appetite. He did have a fall prior to ED visit and notes that he lacked the strength to help sit up. Overall this seems to be persistent and somewhat worse - with more intense weakness. He did have some incontinence on Monday. This was due to being unable to get to the bathroom quickly.  Feet feel numb and weak. No tingling or shooting pain. Planning for MRI tomorrow. Today he is feeling ongoing weakness. He is requesting a wheelchair.     He has had poor oral intake, as his appetite is low and food tastes burnt. He tolerates some foods, like eggs and jackie juice. He reports a slight cough that has been present for some time. It worsens after drinking, but he denies any dysphagia or trouble with food going down. Cough is dry and non productive. No choking.     ECOG performance status   3- Limited self care, <50% of the day up and about         Distress Screening (within last 30 days)    1. How concerned are you about your ability to eat? : 0  2. How concerned are you about unintended weight loss or your current weight? : 0  3. How concerned are you about feeling depressed or very sad? : 0  4. How concerned are you about feeling anxious or very scared? : 0  5. Do you struggle with the loss of meaning and chris in your life? : Not at all  6. How concerned are you about work and home life issues that may be affected by your cancer? : 0  7. How concerned are you about knowing what resources are available to help you? : 0  8. Do you currently have what you would describe as Restorationism or spiritual struggles?: Not at " "all      Pain  Pain Score: Severe Pain (8)  Pain Loc: Lower Leg    ROS  A 14 point review of systems was obtained.  Positive findings noted in the history.  The remainder of the review of system is otherwise negative.      Oncology History/Treatment  Oncologic history:  Intrahepatic cholangiocarcinoma stage IV with multifocal liver lesions, retroperitoneal and abdominal lymphadenopathy and osseous metastases.    Treatment:  Patient started on gemcitabine cisplatin durvalumab on 3/27/2025.  Dose of cisplatin decreased by 25% due to renal function      Physical Exam    /87 (BP Location: Right arm, Patient Position: Sitting, Cuff Size: Adult Regular)   Pulse 82   Temp 97.3  F (36.3  C) (Tympanic)   Resp 18   Ht 1.943 m (6' 4.5\")   Wt 95.9 kg (211 lb 8 oz)   SpO2 97%   BMI 25.41 kg/m      General: Well-appearing male in no acute distress. Cooperative in conversation.  Eyes: EOMI, PERRL. No scleral icterus.  ENT: Oral mucosa is moist without lesions or thrush. No ulcerations or mucositis noted.  Lymphatic: Neck is supple without cervical, axillary, or supraclavicular lymphadenopathy.   Cardiovascular: RRR No murmurs, gallops, or rubs. No peripheral edema.  Respiratory: CTA bilaterally. No wheezes or crackles.  Gastrointestinal: BS +. Abdomen soft, non-tender. No palpable hepatosplenomegaly or masses.   Neurologic: Alert and oriented x3. Cranial nerves II through XII are grossly intact.  Skin: No rashes, petechiae, or bruising noted. Warm, dry, intact.      Lab Results    Recent Results (from the past week)   Comprehensive metabolic panel   Result Value Ref Range    Sodium 136 135 - 145 mmol/L    Potassium 4.1 3.4 - 5.3 mmol/L    Carbon Dioxide (CO2) 23 22 - 29 mmol/L    Anion Gap 10 7 - 15 mmol/L    Urea Nitrogen 38.8 (H) 8.0 - 23.0 mg/dL    Creatinine 1.69 (H) 0.67 - 1.17 mg/dL    GFR Estimate 42 (L) >60 mL/min/1.73m2    Calcium 11.5 (H) 8.8 - 10.4 mg/dL    Chloride 103 98 - 107 mmol/L    Glucose 90 70 - 99 " mg/dL    Alkaline Phosphatase 423 (H) 40 - 150 U/L     (H) 0 - 45 U/L    ALT 97 (H) 0 - 70 U/L    Protein Total 6.5 6.4 - 8.3 g/dL    Albumin 3.1 (L) 3.5 - 5.2 g/dL    Bilirubin Total 0.7 <=1.2 mg/dL   CBC with platelets and differential   Result Value Ref Range    WBC Count 12.7 (H) 4.0 - 11.0 10e3/uL    RBC Count 3.21 (L) 4.40 - 5.90 10e6/uL    Hemoglobin 9.3 (L) 13.3 - 17.7 g/dL    Hematocrit 27.7 (L) 40.0 - 53.0 %    MCV 86 78 - 100 fL    MCH 29.0 26.5 - 33.0 pg    MCHC 33.6 31.5 - 36.5 g/dL    RDW 13.3 10.0 - 15.0 %    Platelet Count 280 150 - 450 10e3/uL    % Neutrophils 94 %    % Lymphocytes 3 %    % Monocytes 0 %    % Eosinophils 0 %    % Basophils 0 %    % Immature Granulocytes 2 %    NRBCs per 100 WBC 0 <1 /100    Absolute Neutrophils 12.0 (H) 1.6 - 8.3 10e3/uL    Absolute Lymphocytes 0.4 (L) 0.8 - 5.3 10e3/uL    Absolute Monocytes 0.1 0.0 - 1.3 10e3/uL    Absolute Eosinophils 0.0 0.0 - 0.7 10e3/uL    Absolute Basophils 0.0 0.0 - 0.2 10e3/uL    Absolute Immature Granulocytes 0.2 <=0.4 10e3/uL    Absolute NRBCs 0.0 10e3/uL   Extra Blood Culture Bottle   Result Value Ref Range    Hold Specimen JI    UA with Microscopic reflex to Culture    Specimen: Urine, Midstream   Result Value Ref Range    Color Urine Light Yellow Colorless, Straw, Light Yellow, Yellow    Appearance Urine Clear Clear    Glucose Urine Negative Negative mg/dL    Bilirubin Urine Negative Negative    Ketones Urine Negative Negative mg/dL    Specific Gravity Urine 1.018 1.001 - 1.030    Blood Urine Negative Negative    pH Urine 5.0 5.0 - 7.0    Protein Albumin Urine 20 (A) Negative mg/dL    Urobilinogen Urine Normal Normal mg/dL    Nitrite Urine Negative Negative    Leukocyte Esterase Urine Negative Negative    Mucus Urine Present (A) None Seen /LPF    RBC Urine 1 <=2 /HPF    WBC Urine 1 <=5 /HPF    Squamous Epithelials Urine <1 <=1 /HPF   Urine Culture    Specimen: Urine, Midstream   Result Value Ref Range    Culture No Growth    CBC  with platelets and differential   Result Value Ref Range    WBC Count 10.5 4.0 - 11.0 10e3/uL    RBC Count 3.16 (L) 4.40 - 5.90 10e6/uL    Hemoglobin 9.0 (L) 13.3 - 17.7 g/dL    Hematocrit 27.0 (L) 40.0 - 53.0 %    MCV 85 78 - 100 fL    MCH 28.5 26.5 - 33.0 pg    MCHC 33.3 31.5 - 36.5 g/dL    RDW 13.0 10.0 - 15.0 %    Platelet Count 148 (L) 150 - 450 10e3/uL    % Neutrophils 89 %    % Lymphocytes 5 %    % Monocytes 4 %    % Eosinophils 0 %    % Basophils 0 %    % Immature Granulocytes 2 %    NRBCs per 100 WBC 0 <1 /100    Absolute Neutrophils 9.4 (H) 1.6 - 8.3 10e3/uL    Absolute Lymphocytes 0.5 (L) 0.8 - 5.3 10e3/uL    Absolute Monocytes 0.4 0.0 - 1.3 10e3/uL    Absolute Eosinophils 0.0 0.0 - 0.7 10e3/uL    Absolute Basophils 0.0 0.0 - 0.2 10e3/uL    Absolute Immature Granulocytes 0.2 <=0.4 10e3/uL    Absolute NRBCs 0.0 10e3/uL     I reviewed the above labs today.  Imaging    CT Abdomen Pelvis w/o Contrast    Result Date: 3/31/2025  EXAM: CT ABDOMEN PELVIS W/O CONTRAST LOCATION: [facility] DATE: 3/31/2025 INDICATION: Abdominal pain. History of cholangiocarcinoma. No bowel movement for 5 days. Small bowel obstruction. COMPARISON: 1. Whole-body PET/CT 3/12/2025. 2. CT abdomen and pelvis with IV contrast 2/14/2025. TECHNIQUE: CT scan of the abdomen and pelvis was performed without IV contrast. Multiplanar reformats were obtained. Dose reduction techniques were used. CONTRAST: None. FINDINGS: LOWER CHEST: A few strands of platelike atelectasis have developed in the lower lungs. No pleural effusion on either side. Normal cardiac size. Hypodensity of the cardiac ventricles suggests anemia. Coronary artery calcifications. Trace pericardial effusion. HEPATOBILIARY: Large confluent central hepatic hypodense mass, interval confluence of multiple rounded lesions seen on prior studies measuring a conglomerate 14.5 cm AP x 10 cm transversely x 12.7 cm CC dimension (image 70, series 3, image 51, series  5).  Intrahepatic segmental biliary ectasia due to central masses. The liver is enlarged, length of the right lobe measures 28 cm (image 45, series 5), previously 26 cm. Dependent calcified gallstones without adjacent inflammatory changes. Small amount of ascites adjacent to the liver, unchanged. PANCREAS: Normal. SPLEEN: Enlarged without discrete lesion, AP length measures 17.6 cm (image 28, series 2), previously 14.3 cm. ADRENAL GLANDS: Normal. KIDNEYS/BLADDER: Right renal cortical cysts, no specific follow-up required. No urinary tract calculi. Both kidneys are negative for hydronephrosis or hydroureter. Partially distended urinary bladder. Prostatomegaly bulging into the bladder base. BOWEL: Moderate amount of formed stool material within normal caliber redundant colon, without mechanical obstruction or free gas. Small amount of ascites. LYMPH NODES: Shotty subcentimeter retroperitoneal nodes. VASCULATURE: Atherosclerotic and ectatic distal abdominal aorta measuring 2.4 x 2.4 cm (image 56, series 2). Normal caliber IVC. PELVIC ORGANS: Prostatomegaly. Small amount of ascites. Atherosclerotic changes. MUSCULOSKELETAL: Mild diffuse body wall edema. Degenerative changes involving the spine and joints of the pelvis.     IMPRESSION: 1.  Large confluent central hepatic hypodense mass, interval confluence of multiple rounded lesions seen previously. The liver is enlarged, length of the right lobe measures 28 cm. Foci of intrahepatic biliary ectasia attributable to confluent central mass. Small amount of ascites adjacent to the liver, stable. Cholelithiasis. Moderate splenomegaly, more apparent on current study. 2.  Moderate amount of formed stool material within normal caliber colon. No mechanical obstruction or free gas. 3.  Right renal cortical cysts, these require no specific follow-up. No urinary tract calculi, hydronephrosis or hydroureter. Prostatomegaly bulging into the bladder base. 4.  Normal cardiac size.  Coronary artery calcifications. Hypodensity of the cardiac ventricles suggests anemia. Atherosclerotic and mildly ectatic abdominal aorta. No aneurysm. 5.  Mild diffuse body wall edema. Degenerative changes involving the spine and joints of the pelvis.     PET Oncology (Eyes to Thighs)    Result Date: 3/12/2025  EXAM: PET ONCOLOGY (EYES TO THIGHS), CT CHEST W CONTRAST LOCATION: [facility] DATE: 3/12/2025 INDICATION: Abnormal findings on diagnostic imaging of liver and biliary tract. Carcinoma of unknown origin liver biopsy. COMPARISON: CT abdomen pelvis 2/14/2025. CONTRAST: 100 mL Isovue-370, IV. TECHNIQUE: Serum glucose level 76 mg/dL. One hour post intravenous administration of 12.9mCi F18DG, PET imaging was performed from the skull vertex to mid thighs, utilizing attenuation correction with concurrent axial CT and PET/CT image fusion. Separate  diagnostic CT of the chest was performed. Dose reduction techniques were used. PET/CT FINDINGS: Large heterogeneous FDG avid liver metastases which measures 13.1 x 12.9 x 23.9 cm (SUV max 45.1) with several smaller FDG avid satellite lesions throughout the remaining liver parenchyma, suspicious for neoplasm. Associated FDG avid upper abdominal lymph nodes including gastrohepatic (SUV max 9.0), portacaval (SUV max 17.4) and retroperitoneal (SUV max 3.3), suspicious for zulema metastatic disease. FDG avid osseous lesion in the right iliac crest measuring 3.5 x 2.5 cm (SUV max 31.8) and subcentimeter lesion in the medial left clavicle (SUV max 3.5), suspicious for sites of osseous metastatic disease. CT FINDINGS: Mild senescent intracranial changes. Right chest wall port with tip in mid SVC. Mild to moderate coronary artery calcification. Few strands of scar/atelectasis in the lungs. There are few tiny pulmonary nodules (for example the right upper lobe measuring  3 mm series 8 image 31, periphery of the right upper lobe measuring 3 mm series 8 image  53, right lower lobe nodule measuring 6 mm series 8 image 72, and right lower lobe measuring 4 mm series 8 image 77), which are too small to characterize by PET/CT. Previously described cardiophrenic lymph nodes are not FDG avid above background levels. Intrahepatic biliary dilatation and narrowing of the portal vein adjacent to the dominant liver lesion. Right renal cyst. Additional scattered atherosclerotic calcifications. Small volume ascites. Enlarged prostate. Pelvic phleboliths. Multilevel degenerative changes of the spine.     IMPRESSION: 1.  FDG avid large dominant liver mass, additional satellite nodules in the liver, upper abdominal and retroperitoneal lymph nodes, and osseous lesions in the right iliac crest and left clavicle, suspicious for active neoplasm.. 2.  There are few tiny pulmonary nodules which are too small to characterize by PET/CT. Recommend continued attention on follow-up.     CT Chest w Contrast    Result Date: 3/12/2025  EXAM: PET ONCOLOGY (EYES TO THIGHS), CT CHEST W CONTRAST LOCATION: [facility] DATE: 3/12/2025 INDICATION: Abnormal findings on diagnostic imaging of liver and biliary tract. Carcinoma of unknown origin liver biopsy. COMPARISON: CT abdomen pelvis 2/14/2025. CONTRAST: 100 mL Isovue-370, IV. TECHNIQUE: Serum glucose level 76 mg/dL. One hour post intravenous administration of 12.9mCi F18DG, PET imaging was performed from the skull vertex to mid thighs, utilizing attenuation correction with concurrent axial CT and PET/CT image fusion. Separate  diagnostic CT of the chest was performed. Dose reduction techniques were used. PET/CT FINDINGS: Large heterogeneous FDG avid liver metastases which measures 13.1 x 12.9 x 23.9 cm (SUV max 45.1) with several smaller FDG avid satellite lesions throughout the remaining liver parenchyma, suspicious for neoplasm. Associated FDG avid upper abdominal lymph nodes including gastrohepatic (SUV max 9.0), portacaval (SUV  max 17.4) and retroperitoneal (SUV max 3.3), suspicious for zulema metastatic disease. FDG avid osseous lesion in the right iliac crest measuring 3.5 x 2.5 cm (SUV max 31.8) and subcentimeter lesion in the medial left clavicle (SUV max 3.5), suspicious for sites of osseous metastatic disease. CT FINDINGS: Mild senescent intracranial changes. Right chest wall port with tip in mid SVC. Mild to moderate coronary artery calcification. Few strands of scar/atelectasis in the lungs. There are few tiny pulmonary nodules (for example the right upper lobe measuring  3 mm series 8 image 31, periphery of the right upper lobe measuring 3 mm series 8 image 53, right lower lobe nodule measuring 6 mm series 8 image 72, and right lower lobe measuring 4 mm series 8 image 77), which are too small to characterize by PET/CT. Previously described cardiophrenic lymph nodes are not FDG avid above background levels. Intrahepatic biliary dilatation and narrowing of the portal vein adjacent to the dominant liver lesion. Right renal cyst. Additional scattered atherosclerotic calcifications. Small volume ascites. Enlarged prostate. Pelvic phleboliths. Multilevel degenerative changes of the spine.     IMPRESSION: 1.  FDG avid large dominant liver mass, additional satellite nodules in the liver, upper abdominal and retroperitoneal lymph nodes, and osseous lesions in the right iliac crest and left clavicle, suspicious for active neoplasm.. 2.  There are few tiny pulmonary nodules which are too small to characterize by PET/CT. Recommend continued attention on follow-up.     IR Chest Port Placement > 5 Yrs of Age    Result Date: 3/10/2025  [place] [facility] LOCATION: [facility] DATE: 3/10/2025 PROCEDURE: IMPLANTABLE VENOUS CHEST PORT PLACEMENT (POWER INJECTABLE) INTERVENTIONAL RADIOLOGIST: Harinder Ramirez MD. INDICATION: Bile duct carcinoma. Chest port placement for initiation of therapy. CONSENT: The risks, benefits and  alternatives of implantable venous chest port placement were discussed with the patient in detail. All questions were answered. Informed consent was given to proceed with the procedure. MODERATE SEDATION: Versed 1.5 mg IV; Fentanyl 75 mcg IV.  During the timeout, immediately prior to the administration of medications, the patient was reassessed for adequacy to receive conscious sedation. Under physician supervision, Versed and fentanyl were administered for moderate sedation. Pulse oximetry, heart rate and blood pressure were continuously monitored by an independent trained observer. The physician spent 15 minutes of face-to-face sedation time with the patient. CONTRAST: None. ANTIBIOTICS: Ancef 2 g IV. ADDITIONAL MEDICATIONS: None. FLUOROSCOPIC TIME: 0.9 minutes. RADIATION DOSE: Air Kerma: 6 mGy. COMPLICATIONS: No immediate complications. STERILE BARRIER TECHNIQUE: Maximum sterile barrier technique was used. Cutaneous antisepsis was performed at the operative site with application of 2% chlorhexidine and large sterile drape. Prior to the procedure, the  and assistant performed hand hygiene and wore hat, mask, sterile gown, and sterile gloves during the entire procedure. PROCEDURE:  Using real-time ultrasound guidance the right internal jugular vein was accessed. A subcutaneous pocket was created and irrigated with sterile normal saline. The catheter tubing was tunneled in an antegrade fashion from the port pocket to the dermatotomy site. Over a guidewire, and under direct fluoroscopic visualization a peel-away sheath was advanced over the wire. Through the peel-away sheath, the catheter tubing was advanced until the tip was at the cavoatrial junction. The catheter tubing was cut to length and attached firmly to the port. The port was placed within the subcutaneous pocket and tested. The port pocket incision was closed with layered absorbable suture and surgical glue. The dermatotomy site was closed with  "surgical glue. FINDINGS: Ultrasound demonstrates an anechoic and compressible jugular vein. A permanent image was stored. At the completion of the study the port tip lies near the cavoatrial junction.     IMPRESSION:  1.  Successful implantable venous chest port placement as detailed above. 2.  The implantable venous chest port was placed under fluoroscopic guidance and is ready for use immediately.        Billing  Total time 65 minutes, to include face to face visit, review of EMR, ordering, documentation and coordination of care on date of service. The longitudinal plan of care for the diagnosis(es)/condition(s) as documented were addressed during this visit. Due to the added complexity in care, I will continue to support Anuel in the subsequent management and with ongoing continuity of care.    Signed by: CODI Sharma CNP        Chart documentation with Dragon Voice recognition Software. Although reviewed after completion, some words and grammatical errors may remain.    Oncology Rooming Note    April 3, 2025 10:53 AM   David Samaniego is a 74 year old male who presents for:    Chief Complaint   Patient presents with     Oncology Clinic Visit     Follow up -   Cholangiocarcinoma      Initial Vitals: /87 (BP Location: Right arm, Patient Position: Sitting, Cuff Size: Adult Regular)   Pulse 82   Temp 97.3  F (36.3  C) (Tympanic)   Resp 18   Ht 1.943 m (6' 4.5\")   Wt 95.9 kg (211 lb 8 oz)   SpO2 97%   BMI 25.41 kg/m   Estimated body mass index is 25.41 kg/m  as calculated from the following:    Height as of this encounter: 1.943 m (6' 4.5\").    Weight as of this encounter: 95.9 kg (211 lb 8 oz). Body surface area is 2.28 meters squared.  Severe Pain (8) Comment: Data Unavailable   No LMP for male patient.  Allergies reviewed: Yes  Medications reviewed: Yes    Medications: Medication refills not needed today.  Pharmacy name entered into [employer]:    [facility] - [place], [place] - [address] " [id number] - [place], [place] - [address] AT [facility] OF [address]    Frailty Screening:   Is the patient here for a new oncology consult visit in cancer care? 2. No    PHQ9:  Did this patient require a PHQ9?: No      Clinical concerns:   Follow up.   Questions regarding medications - Anti nausea and constipation side effect interaction.   Cough, chest fullness. Multiple concerns.     Farzaneh Alarcon MA                Again, thank you for allowing me to participate in the care of your patient.        Sincerely,        CODI Sharma CNP    Electronically signed

## 2025-04-03 NOTE — PROGRESS NOTES
Prior to Provider visit, port accessed without difficulty today, labs drawn and saline locked. Shira Pastrana RN

## 2025-04-03 NOTE — PROGRESS NOTES
Infusion Nursing Note:  David Samaniego presents today for gemzar and cisplatin.    Patient seen by provider today: Yes: Manda Teran   present during visit today: Not Applicable.    Note: /87   Pulse 82   Temp 97.3  F (36.3  C)   Resp 18   SpO2 97%   .    Premeds Given: aloxi and emend. 1L NS given prior to cisplatin infusion.    Intravenous Access:  Implanted Port.    Treatment Conditions:  Lab Results   Component Value Date    HGB 9.0 (L) 04/03/2025    WBC 10.5 04/03/2025    ANEUTAUTO 9.4 (H) 04/03/2025     (L) 04/03/2025        Lab Results   Component Value Date     04/03/2025    POTASSIUM 3.9 04/03/2025    MAG 1.9 04/03/2025    CR 1.66 (H) 04/03/2025    BENITO 12.2 (H) 04/03/2025    BILITOTAL 0.6 04/03/2025    ALBUMIN 3.0 (L) 04/03/2025    ALT 41 04/03/2025    AST 41 04/03/2025       Results reviewed, labs MET treatment parameters, ok to proceed with treatment.      Post Infusion Assessment:  Patient tolerated infusion without incident.  Blood return noted pre and post infusion.  Site patent and intact, free from redness, edema or discomfort.  No evidence of extravasations.  Access discontinued per protocol.       Discharge Plan:   Patient discharged in stable condition accompanied by: self and wife.  Departure Mode: Ambulatory.      Susan Lizarraga RN

## 2025-04-03 NOTE — LETTER
"4/3/2025      David Samaniego  [id number] [address]      Dear Colleague,    Thank you for referring your patient, David Samaniego, to the [facility]. Please see a copy of my visit note below.    CLINICAL NUTRITION SERVICES - ASSESSMENT NOTE    David Samaniego 74 year old referred for MNT related to cholangiocarcinoma     Time Spent: 30 minutes  Visit Type: initial  Pt accompanied by: Rin (wife) and Alec (daughter)  Referring Physician: Dr. Gaviria    NUTRITION HISTORY  Factors affecting nutrition intake include: constipation (improved), decreased appetite, nausea, and taste aversions  Current diet/appetite: regular diet/ fair appetite  Chemotherapy: ongoing  Radiation: n/a    Diet Recall  Anuel is typically eating 3 small meals/ day. Breakfast is cream of wheat with butter. He is also eating plan chicken, eggs, baked potatoes, corn, peas, and rice krispies with milk. He is drinking water, jackie juice, coffee, and premier protein. He usually has about 1/2 premier protein/ day. He notes that taste is his biggest barrier to eating well. Many foods do not taste good.     Treatment Plan:  Oncology History   Cholangiocarcinoma (H)   3/24/2025 Initial Diagnosis    Cholangiocarcinoma (H)     3/27/2025 -  Chemotherapy    OP ONC Biliary Tract Cancer - Durvalumab / CISplatin / Gemcitabine then Maintenance Durvalumab  Plan Provider: Rg Gaviria MD  Treatment goal: Palliative  Line of treatment: First Line       Treatment plan has been reviewed.    ANTHROPOMETRICS  Height:   Ht Readings from Last 1 Encounters:   04/03/25 1.943 m (6' 4.5\")     Weight:   Wt Readings from Last 1 Encounters:   04/03/25 95.9 kg (211 lb 8 oz)     BMI: 25.41 kg/m2  Weight Status:  Overweight BMI 25-29.9  IBW: 205 lbs/ 93.2 kg (103%)  Weight History: Anuel reports historical baseline weight of 220 lbs. Weight has recently trended down. Noted wt loss of 3 kg (3% in the past 2 months).  Wt Readings " from Last 10 Encounters:   04/03/25 95.9 kg (211 lb 8 oz)   03/31/25 98.9 kg (218 lb)   03/27/25 98.7 kg (217 lb 11.2 oz)   03/24/25 95.3 kg (210 lb)   03/05/25 97.5 kg (215 lb)   02/05/25 98.9 kg (218 lb)   04/16/24 98.9 kg (218 lb)   04/01/24 100.7 kg (222 lb 1.6 oz)   09/15/23 100.2 kg (221 lb)   04/13/23 99.4 kg (219 lb 3.2 oz)       Dosing Weight: 96 kg    Medications/vitamins/minerals/herbals:   Reviewed    Labs:   Labs reviewed    ASSESSED NUTRITION NEEDS:  Estimated Energy Needs: 4943-5721 kcals (25-30 Kcal/Kg)  Justification: maintenance  Estimated Protein Needs: 77-96 grams protein (0.8-1 g pro/Kg)  Justification: maintenance (kidney function but receiving chemo)  Estimated Fluid Needs: 9093-2262 mL   Justification: maintenance    MALNUTRITION:  % Weight Loss:  Weight loss does not meet criteria for malnutrition (3% in 2 months)  % Intake:  <75% for >/= 1 month (moderate malnutrition)  Subcutaneous Fat Loss:  None observed  Muscle Loss:  None observed  Fluid Retention:  None noted    Malnutrition Diagnosis: Patient does not meet two of the above criteria necessary for diagnosing malnutrition    NUTRITION DIAGNOSIS:  Inadequate oral intake related to decreased ability to consume sufficient energy due to side effects of cancer therapy as evidenced by 3% wt loss x past 2 months, dietary intake 50-75% estimated needs.      INTERVENTIONS  Provided written & verbal education:     - Discussed nutrition and hydration needs. Encouraged pt to aim for at least 2500 kcal and 80 g protein. Encouraged fluid intake to support hydration.  - Discussed strategies to help fortify meals and snacks. Encouraged to focus on small, frequent meals.    - Reviewed sources of protein. Encouraged to have a protein source with each meal and snack.    - Reviewed common barriers to eating with cancer treatment.  Discussed ways to cope with low appetite and taste changes.   - Reviewed high calorie/high protein oral nutrition supplement  options (Ensure Complete, Ensure Plus/Boost Plus, Boost VHC etc).   - Encouraged utilizing these ONS in home made shakes/smoothies to prevent flavor fatigue.      Provided pt with corresponding education materials/handouts on:  High Calorie High Protein Nutrition Therapy, High Calorie High Protein Recipes, Six Small Meals, Maximizing Nutrition During Cancer Treatment, High Calorie High Protein Beverage Recipes, Coping with Nausea and Vomiting, Coping with Constipation    Pt verbalize understanding of materials provided during consult.   Patient Understanding: Excellent  Expected patient engagement: Excellent    Goals  1. Aim for 5-6 small frequent meals  2. Aim for 2400 kcal and 80 g protein, 10 cups fluids/day  3. Weight maintenance     Follow-Up Plans: Pt has RD contact information for questions.      MONITORING AND EVALUATION:  -Food/beverage intake  -Weight trends      Queenie Velásquez, MS, RD, LD      Again, thank you for allowing me to participate in the care of your patient.        Sincerely,        Cristy Velásquez RD    Electronically signed

## 2025-04-04 ENCOUNTER — HOSPITAL ENCOUNTER (OUTPATIENT)
Dept: MRI IMAGING | Facility: HOSPITAL | Age: 75
Discharge: HOME | End: 2025-04-04
Attending: INTERNAL MEDICINE | Admitting: INTERNAL MEDICINE
Payer: COMMERCIAL

## 2025-04-04 DIAGNOSIS — G95.20 CORD COMPRESSION SYNDROME (H): ICD-10-CM

## 2025-04-04 DIAGNOSIS — C22.1 CHOLANGIOCARCINOMA (H): ICD-10-CM

## 2025-04-04 PROCEDURE — 255N000002 HC RX 255 OP 636: Performed by: INTERNAL MEDICINE

## 2025-04-04 PROCEDURE — A9585 GADOBUTROL INJECTION: HCPCS | Performed by: INTERNAL MEDICINE

## 2025-04-04 PROCEDURE — 72158 MRI LUMBAR SPINE W/O & W/DYE: CPT

## 2025-04-04 RX ORDER — GADOBUTROL 604.72 MG/ML
0.1 INJECTION INTRAVENOUS ONCE
Status: COMPLETED | OUTPATIENT
Start: 2025-04-04 | End: 2025-04-04

## 2025-04-04 RX ADMIN — GADOBUTROL 9.59 ML: 604.72 INJECTION INTRAVENOUS at 08:22

## 2025-04-06 NOTE — DISCHARGE INSTRUCTIONS
Call your oncologist and discuss further treatments if necessary.  Take MiraLAX twice a day for the next couple of days to see if that helps.  Avoid enemas.   Patient oriented to room and ED throughput process.  Safety measures with ED bed locked in lowest position and call light in reach.  Patient educated on all orders, including any medications.  Patient educated on chief complaint/symptoms. Patient encouraged to ask questions regarding care, medications or treatment plan.  Patient aware of how to reach staff with questions/concerns.

## 2025-04-10 ENCOUNTER — PATIENT OUTREACH (OUTPATIENT)
Dept: ONCOLOGY | Facility: HOSPITAL | Age: 75
End: 2025-04-10

## 2025-04-10 ENCOUNTER — LAB (OUTPATIENT)
Dept: INFUSION THERAPY | Facility: HOSPITAL | Age: 75
End: 2025-04-10
Attending: INTERNAL MEDICINE
Payer: COMMERCIAL

## 2025-04-10 ENCOUNTER — ONCOLOGY VISIT (OUTPATIENT)
Dept: ONCOLOGY | Facility: HOSPITAL | Age: 75
End: 2025-04-10
Attending: INTERNAL MEDICINE
Payer: COMMERCIAL

## 2025-04-10 VITALS
SYSTOLIC BLOOD PRESSURE: 132 MMHG | HEART RATE: 80 BPM | TEMPERATURE: 97.6 F | DIASTOLIC BLOOD PRESSURE: 84 MMHG | BODY MASS INDEX: 24.09 KG/M2 | RESPIRATION RATE: 18 BRPM | HEIGHT: 77 IN | WEIGHT: 204 LBS | OXYGEN SATURATION: 98 %

## 2025-04-10 DIAGNOSIS — R16.0 LIVER MASS: ICD-10-CM

## 2025-04-10 DIAGNOSIS — M62.81 MUSCLE WEAKNESS (GENERALIZED): ICD-10-CM

## 2025-04-10 DIAGNOSIS — C22.1 CHOLANGIOCARCINOMA (H): Primary | ICD-10-CM

## 2025-04-10 DIAGNOSIS — E83.52 HYPERCALCEMIA: ICD-10-CM

## 2025-04-10 DIAGNOSIS — R35.0 URINARY FREQUENCY: ICD-10-CM

## 2025-04-10 DIAGNOSIS — R63.0 ANOREXIA: ICD-10-CM

## 2025-04-10 LAB
ALBUMIN UR-MCNC: 30 MG/DL
ANION GAP SERPL CALCULATED.3IONS-SCNC: 10 MMOL/L (ref 7–15)
APPEARANCE UR: CLEAR
BASOPHILS # BLD AUTO: 0 10E3/UL (ref 0–0.2)
BASOPHILS NFR BLD AUTO: 0 %
BILIRUB UR QL STRIP: NEGATIVE
BUN SERPL-MCNC: 28.9 MG/DL (ref 8–23)
CALCIUM SERPL-MCNC: 11.3 MG/DL (ref 8.8–10.4)
CHLORIDE SERPL-SCNC: 102 MMOL/L (ref 98–107)
COLOR UR AUTO: YELLOW
CREAT SERPL-MCNC: 1.38 MG/DL (ref 0.67–1.17)
EGFRCR SERPLBLD CKD-EPI 2021: 53 ML/MIN/1.73M2
EOSINOPHIL # BLD AUTO: 0 10E3/UL (ref 0–0.7)
EOSINOPHIL NFR BLD AUTO: 0 %
ERYTHROCYTE [DISTWIDTH] IN BLOOD BY AUTOMATED COUNT: 12.7 % (ref 10–15)
GLUCOSE SERPL-MCNC: 97 MG/DL (ref 70–99)
GLUCOSE UR STRIP-MCNC: NEGATIVE MG/DL
HCO3 SERPL-SCNC: 23 MMOL/L (ref 22–29)
HCT VFR BLD AUTO: 26 % (ref 40–53)
HGB BLD-MCNC: 8.5 G/DL (ref 13.3–17.7)
HGB UR QL STRIP: NEGATIVE
IMM GRANULOCYTES # BLD: 0.1 10E3/UL
IMM GRANULOCYTES NFR BLD: 2 %
KETONES UR STRIP-MCNC: NEGATIVE MG/DL
LEUKOCYTE ESTERASE UR QL STRIP: NEGATIVE
LYMPHOCYTES # BLD AUTO: 0.4 10E3/UL (ref 0.8–5.3)
LYMPHOCYTES NFR BLD AUTO: 14 %
MCH RBC QN AUTO: 27.9 PG (ref 26.5–33)
MCHC RBC AUTO-ENTMCNC: 32.7 G/DL (ref 31.5–36.5)
MCV RBC AUTO: 85 FL (ref 78–100)
MONOCYTES # BLD AUTO: 0.1 10E3/UL (ref 0–1.3)
MONOCYTES NFR BLD AUTO: 2 %
MUCOUS THREADS #/AREA URNS LPF: PRESENT /LPF
NEUTROPHILS # BLD AUTO: 2.4 10E3/UL (ref 1.6–8.3)
NEUTROPHILS NFR BLD AUTO: 82 %
NITRATE UR QL: NEGATIVE
NRBC # BLD AUTO: 0 10E3/UL
NRBC BLD AUTO-RTO: 0 /100
PH UR STRIP: 5.5 [PH] (ref 5–7)
PLAT MORPH BLD: NORMAL
PLATELET # BLD AUTO: 65 10E3/UL (ref 150–450)
POTASSIUM SERPL-SCNC: 4.2 MMOL/L (ref 3.4–5.3)
RBC # BLD AUTO: 3.05 10E6/UL (ref 4.4–5.9)
RBC MORPH BLD: NORMAL
RBC URINE: 1 /HPF
SODIUM SERPL-SCNC: 135 MMOL/L (ref 135–145)
SP GR UR STRIP: 1.02 (ref 1–1.03)
SQUAMOUS EPITHELIAL: <1 /HPF
UROBILINOGEN UR STRIP-MCNC: NORMAL MG/DL
WBC # BLD AUTO: 2.9 10E3/UL (ref 4–11)
WBC URINE: 1 /HPF

## 2025-04-10 PROCEDURE — 80048 BASIC METABOLIC PNL TOTAL CA: CPT

## 2025-04-10 PROCEDURE — 85025 COMPLETE CBC W/AUTO DIFF WBC: CPT

## 2025-04-10 PROCEDURE — 250N000011 HC RX IP 250 OP 636: Performed by: INTERNAL MEDICINE

## 2025-04-10 PROCEDURE — 81001 URINALYSIS AUTO W/SCOPE: CPT

## 2025-04-10 PROCEDURE — G0463 HOSPITAL OUTPT CLINIC VISIT: HCPCS

## 2025-04-10 PROCEDURE — 36591 DRAW BLOOD OFF VENOUS DEVICE: CPT

## 2025-04-10 RX ORDER — HEPARIN SODIUM (PORCINE) LOCK FLUSH IV SOLN 100 UNIT/ML 100 UNIT/ML
5 SOLUTION INTRAVENOUS
Status: DISCONTINUED | OUTPATIENT
Start: 2025-04-10 | End: 2025-04-10 | Stop reason: HOSPADM

## 2025-04-10 RX ORDER — HEPARIN SODIUM (PORCINE) LOCK FLUSH IV SOLN 100 UNIT/ML 100 UNIT/ML
5 SOLUTION INTRAVENOUS
OUTPATIENT
Start: 2025-04-10

## 2025-04-10 RX ADMIN — HEPARIN 5 ML: 100 SYRINGE at 10:52

## 2025-04-10 ASSESSMENT — PAIN SCALES - GENERAL: PAINLEVEL_OUTOF10: MODERATE PAIN (6)

## 2025-04-10 NOTE — PROGRESS NOTES
"Oncology Rooming Note    April 10, 2025 10:03 AM   David Samaniego is a 74 year old male who presents for:    Chief Complaint   Patient presents with    Oncology Clinic Visit       Cholangiocarcinoma        Initial Vitals: /84 (BP Location: Right arm, Patient Position: Sitting, Cuff Size: Adult Regular)   Pulse 80   Temp 97.6  F (36.4  C) (Tympanic)   Resp 18   Ht 1.943 m (6' 4.5\")   Wt 92.5 kg (204 lb)   SpO2 98%   BMI 24.51 kg/m   Estimated body mass index is 24.51 kg/m  as calculated from the following:    Height as of this encounter: 1.943 m (6' 4.5\").    Weight as of this encounter: 92.5 kg (204 lb). Body surface area is 2.23 meters squared.  Moderate Pain (6) Comment: Data Unavailable   No LMP for male patient.  Allergies reviewed: Yes  Medications reviewed: Yes    Medications: Medication refills not needed today.  Pharmacy name entered into [facility]:    [facility] [address] [id number] [address] AT [facility] [address]    Frailty Screening:   Is the patient here for a new oncology consult visit in cancer care? 2. No    PHQ9:  Did this patient require a PHQ9?: No      Clinical concerns:  follow up pain and fatigue       Eli Remy            "

## 2025-04-10 NOTE — LETTER
"4/10/2025      David Samaniego  [id number] [address] [facility] [id number]      Dear Colleague,    Thank you for referring your patient, David Samaniego, to the [facility]. Please see a copy of my visit note below.    Oncology Rooming Note    April 10, 2025 10:03 AM   David Samaniego is a 74 year old male who presents for:    Chief Complaint   Patient presents with     Oncology Clinic Visit       Cholangiocarcinoma        Initial Vitals: /84 (BP Location: Right arm, Patient Position: Sitting, Cuff Size: Adult Regular)   Pulse 80   Temp 97.6  F (36.4  C) (Tympanic)   Resp 18   Ht 1.943 m (6' 4.5\")   Wt 92.5 kg (204 lb)   SpO2 98%   BMI 24.51 kg/m   Estimated body mass index is 24.51 kg/m  as calculated from the following:    Height as of this encounter: 1.943 m (6' 4.5\").    Weight as of this encounter: 92.5 kg (204 lb). Body surface area is 2.23 meters squared.  Moderate Pain (6) Comment: Data Unavailable   No LMP for male patient.  Allergies reviewed: Yes  Medications reviewed: Yes    Medications: Medication refills not needed today.  Pharmacy name entered into [employer]:    [facility] - [place], [place] - [address] [place] [address] [id number] - [place], [place] - [address] [facility] AT [facility] [address]     Frailty Screening:   Is the patient here for a new oncology consult visit in cancer care? 2. No    PHQ9:  Did this patient require a PHQ9?: No      Clinical concerns:  follow up pain and fatigue       Eli Remy              [facility] Hematology and Oncology Outpatient Progress Note    Patient: David Samaniego  MRN: [id number]  Date of Service: Apr 10, 2025          Reason for Visit    Chief Complaint   Patient presents with     Oncology Clinic Visit       Cholangiocarcinoma          Primary Hematologist/Oncologist: Dr. Aylin Gaviria      Assessment/Plan  #.  Metastatic intrahepatic cholangiocarcinoma  #.  Altered Renal Function  #. Bilateral lower " "extremity weakness  #. Urinary Frequency  Here for labs today and met with patient to assess toleration to treatment. He is feeling tired, and weak still, but thinks this might be getting better. He walked into clinic today rather than using a wheelchair. We talked about the MRI he had recently a how there is no evidence for cord compression. His leg numbness/heaviness is likely related to edema which has lessened. Overall he seems to be doing ok, and improving somewhat. We looked at labs today and note that creatinine continues to improve, now at 1.38. CBC shows WBC of 2.9 with ANC of 2.4. Hgb and platelets trending down, likely related to chemotherapy. No evidence for bleeding or bruising. He has had urinary frequency without any pain. Will check UA to rule out infection. Plan to follow-up with Dr. Gaviria next week for consideration of next cycle of treatment as scheduled.      --Urinalysis did not show evidence for urinary tract infection. Pt was notified via [employer] results.     #.  Anorexia and nausea  #.  Constipation  Patient was complaining of anorexia and nausea he was started on ondansetron but with constipation complications, this was stopped, and he has been using compazine. Nausea has been stable, as he switched back to Zofran after constipation was relieved. Food is still tasting bad. Constipation has been managed with lactulose and senna.   Continue Compazine for nausea  Continue Lactulose   Continue Ensure supplementation  Try using \"FASS\" fixes for taste alterations (Fat, acid, salt, sugar)  Continue to push oral hydration.     ______________________________________________________________________________    History of Present Illness/ Interval History    Mr. David Samaniego  is a 74 year old patient who recently completed C1 Gemcitabine, Cisplatin, and Durvalumab. Here for labs today, but added on for a toxicity assessment. Prior to beginning treatment he had noticed increased bilateral leg weakness. " He had an MRI that did not show evidence for cord compression. Since last week he has continued to feel weak, but overall seems to be improving. He is walking on his own today and last week he was in a wheelchair. He c/o heaviness to feet, and some numbness, which is more prominent when he has more swelling to his BLE's, and that has also decreased. This seems to be better as well. He notes that food is tasting burnt, and it's been hard to eat. They've tried lemon drops but note not much improvement. He is drinking ensure daily and working to increase his fluid intake. Some foods are tolerable. No fevers.  No nausea or diarrhea. He has noticed he is up to the bathroom a lot more, but denies any associated urinary pain or dysfunction.     ECOG performance status   3- Limited self care, <50% of the day up and about         Distress Screening (within last 30 days)    1. How concerned are you about your ability to eat? : 0  2. How concerned are you about unintended weight loss or your current weight? : 0  3. How concerned are you about feeling depressed or very sad? : 0  4. How concerned are you about feeling anxious or very scared? : 0  5. Do you struggle with the loss of meaning and chris in your life? : Not at all  6. How concerned are you about work and home life issues that may be affected by your cancer? : 0  7. How concerned are you about knowing what resources are available to help you? : 0  8. Do you currently have what you would describe as Sikh or spiritual struggles?: Not at all      Pain  Pain Score: Moderate Pain (6)  Pain Loc: Other - see comment (body aches and leg pain)    ROS  A 14 point review of systems was obtained.  Positive findings noted in the history.  The remainder of the review of system is otherwise negative.      Oncology History/Treatment  Oncologic history:  Intrahepatic cholangiocarcinoma stage IV with multifocal liver lesions, retroperitoneal and abdominal lymphadenopathy and osseous  "metastases.    Treatment:  Patient started on gemcitabine cisplatin durvalumab on 3/27/2025.  Dose of cisplatin decreased by 25% due to renal function      Physical Exam    /84 (BP Location: Right arm, Patient Position: Sitting, Cuff Size: Adult Regular)   Pulse 80   Temp 97.6  F (36.4  C) (Tympanic)   Resp 18   Ht 1.943 m (6' 4.5\")   Wt 92.5 kg (204 lb)   SpO2 98%   BMI 24.51 kg/m      General: Well-appearing male in no acute distress. Cooperative in conversation.  Eyes: EOMI, PERRL. No scleral icterus.  ENT: Oral mucosa is moist without lesions or thrush. No ulcerations or mucositis noted.  Lymphatic: Neck is supple without cervical, axillary, or supraclavicular lymphadenopathy.   Cardiovascular: RRR No murmurs, gallops, or rubs. Trace bilateral foot/ankle peripheral edema.  Respiratory: CTA bilaterally. No wheezes or crackles.  Gastrointestinal: BS +. Abdomen soft, non-tender. No palpable hepatosplenomegaly or masses.   Neurologic: Alert and oriented x3. Cranial nerves II through XII are grossly intact.  Skin: No rashes, petechiae, or bruising noted. Warm, dry, intact.      Lab Results    Recent Results (from the past week)   Comprehensive metabolic panel   Result Value Ref Range    Sodium 136 135 - 145 mmol/L    Potassium 3.9 3.4 - 5.3 mmol/L    Carbon Dioxide (CO2) 22 22 - 29 mmol/L    Anion Gap 12 7 - 15 mmol/L    Urea Nitrogen 33.2 (H) 8.0 - 23.0 mg/dL    Creatinine 1.66 (H) 0.67 - 1.17 mg/dL    GFR Estimate 42 (L) >60 mL/min/1.73m2    Calcium 12.2 (H) 8.8 - 10.4 mg/dL    Chloride 102 98 - 107 mmol/L    Glucose 101 (H) 70 - 99 mg/dL    Alkaline Phosphatase 468 (H) 40 - 150 U/L    AST 41 0 - 45 U/L    ALT 41 0 - 70 U/L    Protein Total 6.5 6.4 - 8.3 g/dL    Albumin 3.0 (L) 3.5 - 5.2 g/dL    Bilirubin Total 0.6 <=1.2 mg/dL   Magnesium   Result Value Ref Range    Magnesium 1.9 1.7 - 2.3 mg/dL   CBC with platelets and differential   Result Value Ref Range    WBC Count 10.5 4.0 - 11.0 10e3/uL    RBC " Count 3.16 (L) 4.40 - 5.90 10e6/uL    Hemoglobin 9.0 (L) 13.3 - 17.7 g/dL    Hematocrit 27.0 (L) 40.0 - 53.0 %    MCV 85 78 - 100 fL    MCH 28.5 26.5 - 33.0 pg    MCHC 33.3 31.5 - 36.5 g/dL    RDW 13.0 10.0 - 15.0 %    Platelet Count 148 (L) 150 - 450 10e3/uL    % Neutrophils 89 %    % Lymphocytes 5 %    % Monocytes 4 %    % Eosinophils 0 %    % Basophils 0 %    % Immature Granulocytes 2 %    NRBCs per 100 WBC 0 <1 /100    Absolute Neutrophils 9.4 (H) 1.6 - 8.3 10e3/uL    Absolute Lymphocytes 0.5 (L) 0.8 - 5.3 10e3/uL    Absolute Monocytes 0.4 0.0 - 1.3 10e3/uL    Absolute Eosinophils 0.0 0.0 - 0.7 10e3/uL    Absolute Basophils 0.0 0.0 - 0.2 10e3/uL    Absolute Immature Granulocytes 0.2 <=0.4 10e3/uL    Absolute NRBCs 0.0 10e3/uL     I reviewed the above labs today.  Imaging    MRI Lumbar spine w & w/o contrast    Result Date: 4/4/2025  EXAM: MR LUMBAR SPINE W/O and W CONTRAST LOCATION: [facility] DATE: 4/4/2025 INDICATION: Cord compression syndrome (H). Cholangiocarcinoma (H). COMPARISON: CT abdomen 3/31/2025. PET CT 3/12/2025. Lumbar spine MRI 3/7/2012. CONTRAST: 9.59 mL Gadavist. TECHNIQUE: Routine Lumbar Spine MRI without and with IV contrast. FINDINGS: NOMENCLATURE: Five lumbar-type vertebral bodies. ALIGNMENT: Minimal L4-L5, L3-L4 and L2-L3 retrolisthesis. BONES: Vertebral body heights are unremarkable. There are mild presumably degenerative endplate contour irregularities. Marrow signal is heterogeneous but without specific evidence of a marrow-replacing process. The known metastatic lesion of the right iliac crest is excluded from the field-of-view. No evidence for pathologic marrow edema or enhancement. DISCS: Mild to moderate multilevel spondylosis described in further detail below. SPINAL CORD: The conus terminates at T12-L1. No signal abnormalities of the imaged cord or cauda equina nerve roots are demonstrated. SPINAL CANAL: Moderate L3-L4 spinal canal stenosis. PARASPINAL SOFT  TISSUES: Unremarkable. ABDOMINAL CAVITY: No acute abnormality demonstrated within technique limitations. SIGNIFICANT FINDINGS BY LEVEL: T12-L1: Normal disc height and signal. No herniation. Normal facets. No spinal canal or neural foraminal stenosis. L1-L2: Mild intervertebral disc height loss. Disc desiccation. Trace bulge. No significant facet arthropathy. No significant stenosis of the spinal canal or neural foramina. L2-L3: Mild intervertebral disc height loss. Disc desiccation. Shallow bulge. Mild facet arthropathy. Mild spinal canal stenosis. Moderate right and mild left neural foraminal stenosis. L3-L4: Mild intervertebral disc height loss. Slight desiccation. Shallow bulge and left paracentral protrusion. Moderate facet arthropathy. Ligamentum flavum thickening. Moderate spinal canal stenosis. Moderate bilateral lateral recess stenosis. Mild bilateral neural foraminal stenosis. L4-L5: Advanced intervertebral disc height loss. Shallow bulge. Interbody spurring. Moderate facet arthropathy. Mild spinal canal stenosis. Prior left hemilaminotomy. Mild to moderate left lateral recess stenosis encroaching upon the descending left L5 nerve root. Moderate right and moderate-to-severe left neural foraminal stenosis. L5-S1: Normal disc height and signal. No herniation. Mild facet arthropathy. Prior right hemilaminectomy. No spinal canal or neural foraminal stenosis.     IMPRESSION: 1.  No evidence for metastatic disease of the lumbar spine. A known metastatic lesion of the right iliac crest is excluded from the field-of-view. 2.  No evidence for cord compression. 3.  At L2-L3, there is moderate right neural foraminal stenosis. 4.  At L3-L4, there is moderate spinal canal stenosis and moderate bilateral lateral recess stenosis encroaching upon the descending L4 nerve roots. 5.  At L4-L5, there is mild-to-moderate left lateral recess stenosis encroaching upon the descending left L5 nerve root. There is moderate right and  moderate-to-severe left neural foraminal stenosis. 6.  Additional degenerative changes as above.    CT Abdomen Pelvis w/o Contrast    Result Date: 3/31/2025  EXAM: CT ABDOMEN PELVIS W/O CONTRAST LOCATION: [facility] DATE: 3/31/2025 INDICATION: Abdominal pain. History of cholangiocarcinoma. No bowel movement for 5 days. Small bowel obstruction. COMPARISON: 1. Whole-body PET/CT 3/12/2025. 2. CT abdomen and pelvis with IV contrast 2/14/2025. TECHNIQUE: CT scan of the abdomen and pelvis was performed without IV contrast. Multiplanar reformats were obtained. Dose reduction techniques were used. CONTRAST: None. FINDINGS: LOWER CHEST: A few strands of platelike atelectasis have developed in the lower lungs. No pleural effusion on either side. Normal cardiac size. Hypodensity of the cardiac ventricles suggests anemia. Coronary artery calcifications. Trace pericardial effusion. HEPATOBILIARY: Large confluent central hepatic hypodense mass, interval confluence of multiple rounded lesions seen on prior studies measuring a conglomerate 14.5 cm AP x 10 cm transversely x 12.7 cm CC dimension (image 70, series 3, image 51, series 5).  Intrahepatic segmental biliary ectasia due to central masses. The liver is enlarged, length of the right lobe measures 28 cm (image 45, series 5), previously 26 cm. Dependent calcified gallstones without adjacent inflammatory changes. Small amount of ascites adjacent to the liver, unchanged. PANCREAS: Normal. SPLEEN: Enlarged without discrete lesion, AP length measures 17.6 cm (image 28, series 2), previously 14.3 cm. ADRENAL GLANDS: Normal. KIDNEYS/BLADDER: Right renal cortical cysts, no specific follow-up required. No urinary tract calculi. Both kidneys are negative for hydronephrosis or hydroureter. Partially distended urinary bladder. Prostatomegaly bulging into the bladder base. BOWEL: Moderate amount of formed stool material within normal caliber redundant colon,  without mechanical obstruction or free gas. Small amount of ascites. LYMPH NODES: Shotty subcentimeter retroperitoneal nodes. VASCULATURE: Atherosclerotic and ectatic distal abdominal aorta measuring 2.4 x 2.4 cm (image 56, series 2). Normal caliber IVC. PELVIC ORGANS: Prostatomegaly. Small amount of ascites. Atherosclerotic changes. MUSCULOSKELETAL: Mild diffuse body wall edema. Degenerative changes involving the spine and joints of the pelvis.     IMPRESSION: 1.  Large confluent central hepatic hypodense mass, interval confluence of multiple rounded lesions seen previously. The liver is enlarged, length of the right lobe measures 28 cm. Foci of intrahepatic biliary ectasia attributable to confluent central mass. Small amount of ascites adjacent to the liver, stable. Cholelithiasis. Moderate splenomegaly, more apparent on current study. 2.  Moderate amount of formed stool material within normal caliber colon. No mechanical obstruction or free gas. 3.  Right renal cortical cysts, these require no specific follow-up. No urinary tract calculi, hydronephrosis or hydroureter. Prostatomegaly bulging into the bladder base. 4.  Normal cardiac size. Coronary artery calcifications. Hypodensity of the cardiac ventricles suggests anemia. Atherosclerotic and mildly ectatic abdominal aorta. No aneurysm. 5.  Mild diffuse body wall edema. Degenerative changes involving the spine and joints of the pelvis.     PET Oncology (Eyes to Thighs)    Result Date: 3/12/2025  EXAM: PET ONCOLOGY (EYES TO THIGHS), CT CHEST W CONTRAST LOCATION: [facility] DATE: 3/12/2025 INDICATION: Abnormal findings on diagnostic imaging of liver and biliary tract. Carcinoma of unknown origin liver biopsy. COMPARISON: CT abdomen pelvis 2/14/2025. CONTRAST: 100 mL Isovue-370, IV. TECHNIQUE: Serum glucose level 76 mg/dL. One hour post intravenous administration of 12.9mCi F18DG, PET imaging was performed from the skull vertex to mid  thighs, utilizing attenuation correction with concurrent axial CT and PET/CT image fusion. Separate  diagnostic CT of the chest was performed. Dose reduction techniques were used. PET/CT FINDINGS: Large heterogeneous FDG avid liver metastases which measures 13.1 x 12.9 x 23.9 cm (SUV max 45.1) with several smaller FDG avid satellite lesions throughout the remaining liver parenchyma, suspicious for neoplasm. Associated FDG avid upper abdominal lymph nodes including gastrohepatic (SUV max 9.0), portacaval (SUV max 17.4) and retroperitoneal (SUV max 3.3), suspicious for zulema metastatic disease. FDG avid osseous lesion in the right iliac crest measuring 3.5 x 2.5 cm (SUV max 31.8) and subcentimeter lesion in the medial left clavicle (SUV max 3.5), suspicious for sites of osseous metastatic disease. CT FINDINGS: Mild senescent intracranial changes. Right chest wall port with tip in mid SVC. Mild to moderate coronary artery calcification. Few strands of scar/atelectasis in the lungs. There are few tiny pulmonary nodules (for example the right upper lobe measuring  3 mm series 8 image 31, periphery of the right upper lobe measuring 3 mm series 8 image 53, right lower lobe nodule measuring 6 mm series 8 image 72, and right lower lobe measuring 4 mm series 8 image 77), which are too small to characterize by PET/CT. Previously described cardiophrenic lymph nodes are not FDG avid above background levels. Intrahepatic biliary dilatation and narrowing of the portal vein adjacent to the dominant liver lesion. Right renal cyst. Additional scattered atherosclerotic calcifications. Small volume ascites. Enlarged prostate. Pelvic phleboliths. Multilevel degenerative changes of the spine.     IMPRESSION: 1.  FDG avid large dominant liver mass, additional satellite nodules in the liver, upper abdominal and retroperitoneal lymph nodes, and osseous lesions in the right iliac crest and left clavicle, suspicious for active neoplasm.. 2.   There are few tiny pulmonary nodules which are too small to characterize by PET/CT. Recommend continued attention on follow-up.     CT Chest w Contrast    Result Date: 3/12/2025  EXAM: PET ONCOLOGY (EYES TO THIGHS), CT CHEST W CONTRAST LOCATION: [facility] DATE: 3/12/2025 INDICATION: Abnormal findings on diagnostic imaging of liver and biliary tract. Carcinoma of unknown origin liver biopsy. COMPARISON: CT abdomen pelvis 2/14/2025. CONTRAST: 100 mL Isovue-370, IV. TECHNIQUE: Serum glucose level 76 mg/dL. One hour post intravenous administration of 12.9mCi F18DG, PET imaging was performed from the skull vertex to mid thighs, utilizing attenuation correction with concurrent axial CT and PET/CT image fusion. Separate  diagnostic CT of the chest was performed. Dose reduction techniques were used. PET/CT FINDINGS: Large heterogeneous FDG avid liver metastases which measures 13.1 x 12.9 x 23.9 cm (SUV max 45.1) with several smaller FDG avid satellite lesions throughout the remaining liver parenchyma, suspicious for neoplasm. Associated FDG avid upper abdominal lymph nodes including gastrohepatic (SUV max 9.0), portacaval (SUV max 17.4) and retroperitoneal (SUV max 3.3), suspicious for zulema metastatic disease. FDG avid osseous lesion in the right iliac crest measuring 3.5 x 2.5 cm (SUV max 31.8) and subcentimeter lesion in the medial left clavicle (SUV max 3.5), suspicious for sites of osseous metastatic disease. CT FINDINGS: Mild senescent intracranial changes. Right chest wall port with tip in mid SVC. Mild to moderate coronary artery calcification. Few strands of scar/atelectasis in the lungs. There are few tiny pulmonary nodules (for example the right upper lobe measuring  3 mm series 8 image 31, periphery of the right upper lobe measuring 3 mm series 8 image 53, right lower lobe nodule measuring 6 mm series 8 image 72, and right lower lobe measuring 4 mm series 8 image 77), which are too  small to characterize by PET/CT. Previously described cardiophrenic lymph nodes are not FDG avid above background levels. Intrahepatic biliary dilatation and narrowing of the portal vein adjacent to the dominant liver lesion. Right renal cyst. Additional scattered atherosclerotic calcifications. Small volume ascites. Enlarged prostate. Pelvic phleboliths. Multilevel degenerative changes of the spine.     IMPRESSION: 1.  FDG avid large dominant liver mass, additional satellite nodules in the liver, upper abdominal and retroperitoneal lymph nodes, and osseous lesions in the right iliac crest and left clavicle, suspicious for active neoplasm.. 2.  There are few tiny pulmonary nodules which are too small to characterize by PET/CT. Recommend continued attention on follow-up.        Billing  Total time 45 minutes, to include face to face visit, review of EMR, ordering, documentation and coordination of care on date of service. The longitudinal plan of care for the diagnosis(es)/condition(s) as documented were addressed during this visit. Due to the added complexity in care, I will continue to support Anuel in the subsequent management and with ongoing continuity of care.    Signed by: CODI Sharma CNP        Chart documentation with Dragon Voice recognition Software. Although reviewed after completion, some words and grammatical errors may remain.      Again, thank you for allowing me to participate in the care of your patient.        Sincerely,        CODI Sharma CNP    Electronically signed

## 2025-04-10 NOTE — PROGRESS NOTES
"[facility]: Cancer Care Follow-Up Note                                    Discussion with Patient:                                                      Anuel and his wife, Sofia, are in clinic today for lab only appointment. Anuel asks to speak with nursing staff about some concerns. RN writer met with Anuel and Sofia to discuss concerns.    Anuel reports new BLE edema, decreased oral intake, and difficulty walking due to weakness. Anuel reports \"it feels like there are bricks attached to my feet.\"       Dates of Treatment:                                                      Infusion given in last 28 days       Administered MAR Action Medication Dose Rate Visit    03/27/2025 10:18 New Bag durvalumab (IMFINZI) 1,500 mg in sodium chloride 0.9 % 140 mL infusion 1,500 mg 140 mL/hr Infusion Therapy Visit on 03/27/2025 in [facility]    03/27/2025 11:20 New Bag gemcitabine (GEMZAR) 2,200 mg in sodium chloride 0.9 % 332.86 mL infusion 2,200 mg 665.7 mL/hr Infusion Therapy Visit on 03/27/2025 in [facility]    03/27/2025 11:54 New Bag CISplatin (PLATINOL) 43 mg in sodium chloride 0.9 % 318 mL infusion 43 mg 318 mL/hr Infusion Therapy Visit on 03/27/2025 in [facility]    04/03/2025 13:07 New Bag gemcitabine (GEMZAR) 2,200 mg in sodium chloride 0.9 % 332.86 mL infusion 2,200 mg 665.7 mL/hr Infusion Therapy Visit on 04/03/2025 in [facility]    04/03/2025 13:43 New Bag CISplatin (PLATINOL) 43 mg in sodium chloride 0.9 % 318 mL infusion 43 mg 318 mL/hr Infusion Therapy Visit on 04/03/2025 in [facility]            Assessment:                                                      +2 bilateral lower extremity edema. Labs currently being drawn      Intervention/Education provided during outreach:                                                       Discussed situation with Manda Teran CNP. " Anuel will be added to Manda's schedule to review lab results and symptoms.     Patient to follow up as scheduled at next appt  Patient to call/MyChart message with updates    Signature:  Nhung Encarnacion RN

## 2025-04-10 NOTE — PROGRESS NOTES
Anuel's daughter, Alec, stops in to clinic today with some questions about plans for treatment. Consent to communicate on file.     Alec asks questions about if a [facility] second opinion has been done (regarding treatment plan), how many treatments Anuel can have, genetic testing etc. Alec says that she knows she can ask many of these questions at Dr. Gaviria appointment next week but since she was already here she thought she would ask.     Reviewed with Alec that JOSE Bajwa, for Dr. Gaviria is out of office until Monday. This RN writer can do chart review to see if there has been a oncology second opinion with [facility] and would return call later in the day.     Encouraged Alec to ask any and all questions at Dr. Gaviria visit next week. Encouraged Alec to write her questions down for visit. Emotional support provided.         Update: Return call to Alec to let her know that this RN writer did see notes about pathology consult with [facility] but not medical oncology clinic.     Reiterated that Patricia Mack, JOSE, is the nurse that typically works with Anuel so is more familiar with case. Patricia will reach out to Anuel and family when she returns to office.     Encouraged Alec/Anuel to return call to clinic with any further questions, concerns, or symptoms prior to next appointment. Alec is agreeable to this plan.    Message also sent to JOSE Bajwa, to follow up with Alec, Anuel, and Sofia.    Nhung Encarnacion RN  04/10/25  11:53 AM

## 2025-04-10 NOTE — PROGRESS NOTES
"[facility] Hematology and Oncology Outpatient Progress Note    Patient: David Samaniego  MRN: [id number]  Date of Service: Apr 10, 2025          Reason for Visit    Chief Complaint   Patient presents with    Oncology Clinic Visit       Cholangiocarcinoma          Primary Hematologist/Oncologist: Dr. Aylin Gaviria      Assessment/Plan  #.  Metastatic intrahepatic cholangiocarcinoma  #.  Altered Renal Function  #. Bilateral lower extremity weakness  #. Urinary Frequency  Here for labs today and met with patient to assess toleration to treatment. He is feeling tired, and weak still, but thinks this might be getting better. He walked into clinic today rather than using a wheelchair. We talked about the MRI he had recently a how there is no evidence for cord compression. His leg numbness/heaviness is likely related to edema which has lessened. Overall he seems to be doing ok, and improving somewhat. We looked at labs today and note that creatinine continues to improve, now at 1.38. CBC shows WBC of 2.9 with ANC of 2.4. Hgb and platelets trending down, likely related to chemotherapy. No evidence for bleeding or bruising. He has had urinary frequency without any pain. Will check UA to rule out infection. Plan to follow-up with Dr. Gaviria next week for consideration of next cycle of treatment as scheduled.      --Urinalysis did not show evidence for urinary tract infection. Pt was notified via AppCardt results.     #.  Anorexia and nausea  #.  Constipation  Patient was complaining of anorexia and nausea he was started on ondansetron but with constipation complications, this was stopped, and he has been using compazine. Nausea has been stable, as he switched back to Zofran after constipation was relieved. Food is still tasting bad. Constipation has been managed with lactulose and senna.   Continue Compazine for nausea  Continue Lactulose   Continue Ensure supplementation  Try using \"FASS\" fixes for taste alterations (Fat, " acid, salt, sugar)  Continue to push oral hydration.     ______________________________________________________________________________    History of Present Illness/ Interval History    Mr. David Samaniego  is a 74 year old patient who recently completed C1 Gemcitabine, Cisplatin, and Durvalumab. Here for labs today, but added on for a toxicity assessment. Prior to beginning treatment he had noticed increased bilateral leg weakness. He had an MRI that did not show evidence for cord compression. Since last week he has continued to feel weak, but overall seems to be improving. He is walking on his own today and last week he was in a wheelchair. He c/o heaviness to feet, and some numbness, which is more prominent when he has more swelling to his BLE's, and that has also decreased. This seems to be better as well. He notes that food is tasting burnt, and it's been hard to eat. They've tried lemon drops but note not much improvement. He is drinking ensure daily and working to increase his fluid intake. Some foods are tolerable. No fevers.  No nausea or diarrhea. He has noticed he is up to the bathroom a lot more, but denies any associated urinary pain or dysfunction.     ECOG performance status   3- Limited self care, <50% of the day up and about         Distress Screening (within last 30 days)    1. How concerned are you about your ability to eat? : 0  2. How concerned are you about unintended weight loss or your current weight? : 0  3. How concerned are you about feeling depressed or very sad? : 0  4. How concerned are you about feeling anxious or very scared? : 0  5. Do you struggle with the loss of meaning and chris in your life? : Not at all  6. How concerned are you about work and home life issues that may be affected by your cancer? : 0  7. How concerned are you about knowing what resources are available to help you? : 0  8. Do you currently have what you would describe as [employer] or spiritual struggles?: Not at  "all      Pain  Pain Score: Moderate Pain (6)  Pain Loc: Other - see comment (body aches and leg pain)    ROS  A 14 point review of systems was obtained.  Positive findings noted in the history.  The remainder of the review of system is otherwise negative.      Oncology History/Treatment  Oncologic history:  Intrahepatic cholangiocarcinoma stage IV with multifocal liver lesions, retroperitoneal and abdominal lymphadenopathy and osseous metastases.    Treatment:  Patient started on gemcitabine cisplatin durvalumab on 3/27/2025.  Dose of cisplatin decreased by 25% due to renal function      Physical Exam    /84 (BP Location: Right arm, Patient Position: Sitting, Cuff Size: Adult Regular)   Pulse 80   Temp 97.6  F (36.4  C) (Tympanic)   Resp 18   Ht 1.943 m (6' 4.5\")   Wt 92.5 kg (204 lb)   SpO2 98%   BMI 24.51 kg/m      General: Well-appearing male in no acute distress. Cooperative in conversation.  Eyes: EOMI, PERRL. No scleral icterus.  ENT: Oral mucosa is moist without lesions or thrush. No ulcerations or mucositis noted.  Lymphatic: Neck is supple without cervical, axillary, or supraclavicular lymphadenopathy.   Cardiovascular: RRR No murmurs, gallops, or rubs. Trace bilateral foot/ankle peripheral edema.  Respiratory: CTA bilaterally. No wheezes or crackles.  Gastrointestinal: BS +. Abdomen soft, non-tender. No palpable hepatosplenomegaly or masses.   Neurologic: Alert and oriented x3. Cranial nerves II through XII are grossly intact.  Skin: No rashes, petechiae, or bruising noted. Warm, dry, intact.      Lab Results    Recent Results (from the past week)   Comprehensive metabolic panel   Result Value Ref Range    Sodium 136 135 - 145 mmol/L    Potassium 3.9 3.4 - 5.3 mmol/L    Carbon Dioxide (CO2) 22 22 - 29 mmol/L    Anion Gap 12 7 - 15 mmol/L    Urea Nitrogen 33.2 (H) 8.0 - 23.0 mg/dL    Creatinine 1.66 (H) 0.67 - 1.17 mg/dL    GFR Estimate 42 (L) >60 mL/min/1.73m2    Calcium 12.2 (H) 8.8 - 10.4 " mg/dL    Chloride 102 98 - 107 mmol/L    Glucose 101 (H) 70 - 99 mg/dL    Alkaline Phosphatase 468 (H) 40 - 150 U/L    AST 41 0 - 45 U/L    ALT 41 0 - 70 U/L    Protein Total 6.5 6.4 - 8.3 g/dL    Albumin 3.0 (L) 3.5 - 5.2 g/dL    Bilirubin Total 0.6 <=1.2 mg/dL   Magnesium   Result Value Ref Range    Magnesium 1.9 1.7 - 2.3 mg/dL   CBC with platelets and differential   Result Value Ref Range    WBC Count 10.5 4.0 - 11.0 10e3/uL    RBC Count 3.16 (L) 4.40 - 5.90 10e6/uL    Hemoglobin 9.0 (L) 13.3 - 17.7 g/dL    Hematocrit 27.0 (L) 40.0 - 53.0 %    MCV 85 78 - 100 fL    MCH 28.5 26.5 - 33.0 pg    MCHC 33.3 31.5 - 36.5 g/dL    RDW 13.0 10.0 - 15.0 %    Platelet Count 148 (L) 150 - 450 10e3/uL    % Neutrophils 89 %    % Lymphocytes 5 %    % Monocytes 4 %    % Eosinophils 0 %    % Basophils 0 %    % Immature Granulocytes 2 %    NRBCs per 100 WBC 0 <1 /100    Absolute Neutrophils 9.4 (H) 1.6 - 8.3 10e3/uL    Absolute Lymphocytes 0.5 (L) 0.8 - 5.3 10e3/uL    Absolute Monocytes 0.4 0.0 - 1.3 10e3/uL    Absolute Eosinophils 0.0 0.0 - 0.7 10e3/uL    Absolute Basophils 0.0 0.0 - 0.2 10e3/uL    Absolute Immature Granulocytes 0.2 <=0.4 10e3/uL    Absolute NRBCs 0.0 10e3/uL     I reviewed the above labs today.  Imaging    MRI Lumbar spine w & w/o contrast    Result Date: 4/4/2025  EXAM: MR LUMBAR SPINE W/O and W CONTRAST LOCATION: [facility] DATE: 4/4/2025 INDICATION: Cord compression syndrome (H). Cholangiocarcinoma (H). COMPARISON: CT abdomen 3/31/2025. PET CT 3/12/2025. Lumbar spine MRI 3/7/2012. CONTRAST: 9.59 mL Gadavist. TECHNIQUE: Routine Lumbar Spine MRI without and with IV contrast. FINDINGS: NOMENCLATURE: Five lumbar-type vertebral bodies. ALIGNMENT: Minimal L4-L5, L3-L4 and L2-L3 retrolisthesis. BONES: Vertebral body heights are unremarkable. There are mild presumably degenerative endplate contour irregularities. Marrow signal is heterogeneous but without specific evidence of a  marrow-replacing process. The known metastatic lesion of the right iliac crest is excluded from the field-of-view. No evidence for pathologic marrow edema or enhancement. DISCS: Mild to moderate multilevel spondylosis described in further detail below. SPINAL CORD: The conus terminates at T12-L1. No signal abnormalities of the imaged cord or cauda equina nerve roots are demonstrated. SPINAL CANAL: Moderate L3-L4 spinal canal stenosis. PARASPINAL SOFT TISSUES: Unremarkable. ABDOMINAL CAVITY: No acute abnormality demonstrated within technique limitations. SIGNIFICANT FINDINGS BY LEVEL: T12-L1: Normal disc height and signal. No herniation. Normal facets. No spinal canal or neural foraminal stenosis. L1-L2: Mild intervertebral disc height loss. Disc desiccation. Trace bulge. No significant facet arthropathy. No significant stenosis of the spinal canal or neural foramina. L2-L3: Mild intervertebral disc height loss. Disc desiccation. Shallow bulge. Mild facet arthropathy. Mild spinal canal stenosis. Moderate right and mild left neural foraminal stenosis. L3-L4: Mild intervertebral disc height loss. Slight desiccation. Shallow bulge and left paracentral protrusion. Moderate facet arthropathy. Ligamentum flavum thickening. Moderate spinal canal stenosis. Moderate bilateral lateral recess stenosis. Mild bilateral neural foraminal stenosis. L4-L5: Advanced intervertebral disc height loss. Shallow bulge. Interbody spurring. Moderate facet arthropathy. Mild spinal canal stenosis. Prior left hemilaminotomy. Mild to moderate left lateral recess stenosis encroaching upon the descending left L5 nerve root. Moderate right and moderate-to-severe left neural foraminal stenosis. L5-S1: Normal disc height and signal. No herniation. Mild facet arthropathy. Prior right hemilaminectomy. No spinal canal or neural foraminal stenosis.     IMPRESSION: 1.  No evidence for metastatic disease of the lumbar spine. A known metastatic lesion of the  right iliac crest is excluded from the field-of-view. 2.  No evidence for cord compression. 3.  At L2-L3, there is moderate right neural foraminal stenosis. 4.  At L3-L4, there is moderate spinal canal stenosis and moderate bilateral lateral recess stenosis encroaching upon the descending L4 nerve roots. 5.  At L4-L5, there is mild-to-moderate left lateral recess stenosis encroaching upon the descending left L5 nerve root. There is moderate right and moderate-to-severe left neural foraminal stenosis. 6.  Additional degenerative changes as above.    CT Abdomen Pelvis w/o Contrast    Result Date: 3/31/2025  EXAM: CT ABDOMEN PELVIS W/O CONTRAST LOCATION: [facility] DATE: 3/31/2025 INDICATION: Abdominal pain. History of cholangiocarcinoma. No bowel movement for 5 days. Small bowel obstruction. COMPARISON: 1. Whole-body PET/CT 3/12/2025. 2. CT abdomen and pelvis with IV contrast 2/14/2025. TECHNIQUE: CT scan of the abdomen and pelvis was performed without IV contrast. Multiplanar reformats were obtained. Dose reduction techniques were used. CONTRAST: None. FINDINGS: LOWER CHEST: A few strands of platelike atelectasis have developed in the lower lungs. No pleural effusion on either side. Normal cardiac size. Hypodensity of the cardiac ventricles suggests anemia. Coronary artery calcifications. Trace pericardial effusion. HEPATOBILIARY: Large confluent central hepatic hypodense mass, interval confluence of multiple rounded lesions seen on prior studies measuring a conglomerate 14.5 cm AP x 10 cm transversely x 12.7 cm CC dimension (image 70, series 3, image 51, series 5).  Intrahepatic segmental biliary ectasia due to central masses. The liver is enlarged, length of the right lobe measures 28 cm (image 45, series 5), previously 26 cm. Dependent calcified gallstones without adjacent inflammatory changes. Small amount of ascites adjacent to the liver, unchanged. PANCREAS: Normal. SPLEEN: Enlarged  without discrete lesion, AP length measures 17.6 cm (image 28, series 2), previously 14.3 cm. ADRENAL GLANDS: Normal. KIDNEYS/BLADDER: Right renal cortical cysts, no specific follow-up required. No urinary tract calculi. Both kidneys are negative for hydronephrosis or hydroureter. Partially distended urinary bladder. Prostatomegaly bulging into the bladder base. BOWEL: Moderate amount of formed stool material within normal caliber redundant colon, without mechanical obstruction or free gas. Small amount of ascites. LYMPH NODES: Shotty subcentimeter retroperitoneal nodes. VASCULATURE: Atherosclerotic and ectatic distal abdominal aorta measuring 2.4 x 2.4 cm (image 56, series 2). Normal caliber IVC. PELVIC ORGANS: Prostatomegaly. Small amount of ascites. Atherosclerotic changes. MUSCULOSKELETAL: Mild diffuse body wall edema. Degenerative changes involving the spine and joints of the pelvis.     IMPRESSION: 1.  Large confluent central hepatic hypodense mass, interval confluence of multiple rounded lesions seen previously. The liver is enlarged, length of the right lobe measures 28 cm. Foci of intrahepatic biliary ectasia attributable to confluent central mass. Small amount of ascites adjacent to the liver, stable. Cholelithiasis. Moderate splenomegaly, more apparent on current study. 2.  Moderate amount of formed stool material within normal caliber colon. No mechanical obstruction or free gas. 3.  Right renal cortical cysts, these require no specific follow-up. No urinary tract calculi, hydronephrosis or hydroureter. Prostatomegaly bulging into the bladder base. 4.  Normal cardiac size. Coronary artery calcifications. Hypodensity of the cardiac ventricles suggests anemia. Atherosclerotic and mildly ectatic abdominal aorta. No aneurysm. 5.  Mild diffuse body wall edema. Degenerative changes involving the spine and joints of the pelvis.     PET Oncology (Eyes to Thighs)    Result Date: 3/12/2025  EXAM: PET ONCOLOGY  (EYES TO THIGHS), CT CHEST W CONTRAST LOCATION: [facility] DATE: 3/12/2025 INDICATION: Abnormal findings on diagnostic imaging of liver and biliary tract. Carcinoma of unknown origin liver biopsy. COMPARISON: CT abdomen pelvis 2/14/2025. CONTRAST: 100 mL Isovue-370, IV. TECHNIQUE: Serum glucose level 76 mg/dL. One hour post intravenous administration of 12.9mCi F18DG, PET imaging was performed from the skull vertex to mid thighs, utilizing attenuation correction with concurrent axial CT and PET/CT image fusion. Separate  diagnostic CT of the chest was performed. Dose reduction techniques were used. PET/CT FINDINGS: Large heterogeneous FDG avid liver metastases which measures 13.1 x 12.9 x 23.9 cm (SUV max 45.1) with several smaller FDG avid satellite lesions throughout the remaining liver parenchyma, suspicious for neoplasm. Associated FDG avid upper abdominal lymph nodes including gastrohepatic (SUV max 9.0), portacaval (SUV max 17.4) and retroperitoneal (SUV max 3.3), suspicious for zulema metastatic disease. FDG avid osseous lesion in the right iliac crest measuring 3.5 x 2.5 cm (SUV max 31.8) and subcentimeter lesion in the medial left clavicle (SUV max 3.5), suspicious for sites of osseous metastatic disease. CT FINDINGS: Mild senescent intracranial changes. Right chest wall port with tip in mid SVC. Mild to moderate coronary artery calcification. Few strands of scar/atelectasis in the lungs. There are few tiny pulmonary nodules (for example the right upper lobe measuring  3 mm series 8 image 31, periphery of the right upper lobe measuring 3 mm series 8 image 53, right lower lobe nodule measuring 6 mm series 8 image 72, and right lower lobe measuring 4 mm series 8 image 77), which are too small to characterize by PET/CT. Previously described cardiophrenic lymph nodes are not FDG avid above background levels. Intrahepatic biliary dilatation and narrowing of the portal vein adjacent to the  dominant liver lesion. Right renal cyst. Additional scattered atherosclerotic calcifications. Small volume ascites. Enlarged prostate. Pelvic phleboliths. Multilevel degenerative changes of the spine.     IMPRESSION: 1.  FDG avid large dominant liver mass, additional satellite nodules in the liver, upper abdominal and retroperitoneal lymph nodes, and osseous lesions in the right iliac crest and left clavicle, suspicious for active neoplasm.. 2.  There are few tiny pulmonary nodules which are too small to characterize by PET/CT. Recommend continued attention on follow-up.     CT Chest w Contrast    Result Date: 3/12/2025  EXAM: PET ONCOLOGY (EYES TO THIGHS), CT CHEST W CONTRAST LOCATION: [facility] DATE: 3/12/2025 INDICATION: Abnormal findings on diagnostic imaging of liver and biliary tract. Carcinoma of unknown origin liver biopsy. COMPARISON: CT abdomen pelvis 2/14/2025. CONTRAST: 100 mL Isovue-370, IV. TECHNIQUE: Serum glucose level 76 mg/dL. One hour post intravenous administration of 12.9mCi F18DG, PET imaging was performed from the skull vertex to mid thighs, utilizing attenuation correction with concurrent axial CT and PET/CT image fusion. Separate  diagnostic CT of the chest was performed. Dose reduction techniques were used. PET/CT FINDINGS: Large heterogeneous FDG avid liver metastases which measures 13.1 x 12.9 x 23.9 cm (SUV max 45.1) with several smaller FDG avid satellite lesions throughout the remaining liver parenchyma, suspicious for neoplasm. Associated FDG avid upper abdominal lymph nodes including gastrohepatic (SUV max 9.0), portacaval (SUV max 17.4) and retroperitoneal (SUV max 3.3), suspicious for zulema metastatic disease. FDG avid osseous lesion in the right iliac crest measuring 3.5 x 2.5 cm (SUV max 31.8) and subcentimeter lesion in the medial left clavicle (SUV max 3.5), suspicious for sites of osseous metastatic disease. CT FINDINGS: Mild senescent intracranial  changes. Right chest wall port with tip in mid SVC. Mild to moderate coronary artery calcification. Few strands of scar/atelectasis in the lungs. There are few tiny pulmonary nodules (for example the right upper lobe measuring  3 mm series 8 image 31, periphery of the right upper lobe measuring 3 mm series 8 image 53, right lower lobe nodule measuring 6 mm series 8 image 72, and right lower lobe measuring 4 mm series 8 image 77), which are too small to characterize by PET/CT. Previously described cardiophrenic lymph nodes are not FDG avid above background levels. Intrahepatic biliary dilatation and narrowing of the portal vein adjacent to the dominant liver lesion. Right renal cyst. Additional scattered atherosclerotic calcifications. Small volume ascites. Enlarged prostate. Pelvic phleboliths. Multilevel degenerative changes of the spine.     IMPRESSION: 1.  FDG avid large dominant liver mass, additional satellite nodules in the liver, upper abdominal and retroperitoneal lymph nodes, and osseous lesions in the right iliac crest and left clavicle, suspicious for active neoplasm.. 2.  There are few tiny pulmonary nodules which are too small to characterize by PET/CT. Recommend continued attention on follow-up.        Billing  Total time 45 minutes, to include face to face visit, review of EMR, ordering, documentation and coordination of care on date of service. The longitudinal plan of care for the diagnosis(es)/condition(s) as documented were addressed during this visit. Due to the added complexity in care, I will continue to support Anuel in the subsequent management and with ongoing continuity of care.    Signed by: CODI Sharma CNP        Chart documentation with Dragon Voice recognition Software. Although reviewed after completion, some words and grammatical errors may remain.

## 2025-04-10 NOTE — PROGRESS NOTES
Port accessed without difficulty today, and labs drawn. Port deaccessed following lab draw. Julia Berg RN

## 2025-04-14 ENCOUNTER — PATIENT OUTREACH (OUTPATIENT)
Dept: ONCOLOGY | Facility: HOSPITAL | Age: 75
End: 2025-04-14
Payer: COMMERCIAL

## 2025-04-14 NOTE — PROGRESS NOTES
"[facility]: Cancer Care                                                                                          Called isabell as she had contacted clinic on 4/10/25 regarding questions about her dad, consent to communicate on file dated 3/5/25. She wondered about genetic testing, options about future treatment, how many cycles, if treatment can be changed since her dad is not tolerating his treatment well. He tells her most days that it is 'not a good day\". She said that he has pain. Inquired what he is taking for pain and she is not unsure. Shared that BART Holman saw him on 4/10 and he made no mention of this. Educated that if he is having symptoms that need help with management, he needs to voice them at his clinic visits or call triage as we can help support symptoms when we are aware of them. She will talk with her mom to see how her dad is doing today and encourage them to call if they are needing management. He is not eating well due to early satiety and taste changes. He has met with a dietician for suggestion of how to increase calories.Her dad is not sure if he wants to move forward with another cycle due to how he is feeling at this time. Her dad shared that he knows that his prognosis is not good and has considered  not having chemo and rather just managing symptoms and having more quality of life. She will be at his next clinic visit and will have questions written down that she wants to ask as she is aware that the questions thy have about life expectancy, quality/quantity of life as well as genetic lab results are all topics to discuss with provider. Support and encouragement provided. She has our contact information for future reference.    Signature:  Patricia Mack RN  "

## 2025-04-15 ENCOUNTER — PATIENT OUTREACH (OUTPATIENT)
Dept: ONCOLOGY | Facility: HOSPITAL | Age: 75
End: 2025-04-15
Payer: COMMERCIAL

## 2025-04-15 DIAGNOSIS — C22.1 CHOLANGIOCARCINOMA (H): ICD-10-CM

## 2025-04-15 DIAGNOSIS — G89.3 CANCER ASSOCIATED PAIN: Primary | ICD-10-CM

## 2025-04-15 RX ORDER — OXYCODONE HYDROCHLORIDE 5 MG/1
5 TABLET ORAL
Qty: 42 TABLET | Refills: 0 | Status: SHIPPED | OUTPATIENT
Start: 2025-04-15 | End: 2025-04-22

## 2025-04-15 NOTE — PROGRESS NOTES
"[facility]: Cancer Care Follow-Up Note                                    Discussion with Patient:                                                      Called Anuel to check in and see how he is doing. He shared that the last couple weeks have been \"terrible\" He shared that the symptoms documented below have been present in the past since his dx/prior to dx but have worsened in the last couple of weeks.  Eating- nothing tastes good to him and nothing \"agrees\" with  him. He often feels worse after eating. He has stomach pain 5-9 that is constant from rib cage down and across his abdomen.  This pain worsens with movement or oral intake.He also has fleeting pain that is a stabbing pain that happens with position changes. He is not on any pain medication.. He is on omeprazole. 20 mg BID.  Energy is worsening and can only walk 60 feer before needing to sit down. He wishes he could walk outside to the mailbox and back but is unable.  He does have a cough that used to be just when he would eat something occasionally and now he has also had a cough occasionally the last few days with bending down.  We talked about quality of life and better symptom management. He would be willing to try pain medication and can  continue to titrate pain medication for better symptom management., Shared that his weakness may not improve drastically at this point but is pain is controlled and be is able to move around and eat better, he may have more energy. Educated that Dr. Gaviria has sent a prescription for oxycodone to his [facility] pharmacy and he can take 1 tablet (5 mg) every 4 hours for pain. Shared that he should take with food. He should also be on a bowel regimen as narcotics can cause constipation. He is not constipated now. He does have lactulose but has diarrhea after he takes. Shared that he could switch to miralax daily  or BID, if needed, as this is more gentle, and then not take the lactulose at this time. Also asked that " he write down when he takes the pain med and his response to the dose (pain rating) so that we can reassess dose when he comes to clinic on 4/17/25.  He and his wife stated understanding and appreciation.        Dates of Treatment:                                                      Infusion given in last 28 days       Administered MAR Action Medication Dose Rate Visit    03/27/2025 10:18 New Bag durvalumab (IMFINZI) 1,500 mg in sodium chloride 0.9 % 140 mL infusion 1,500 mg 140 mL/hr Infusion Therapy Visit on 03/27/2025 in [facility]    03/27/2025 11:20 New Bag gemcitabine (GEMZAR) 2,200 mg in sodium chloride 0.9 % 332.86 mL infusion 2,200 mg 665.7 mL/hr Infusion Therapy Visit on 03/27/2025 in [facility]    03/27/2025 11:54 New Bag CISplatin (PLATINOL) 43 mg in sodium chloride 0.9 % 318 mL infusion 43 mg 318 mL/hr Infusion Therapy Visit on 03/27/2025 in [facility]    04/03/2025 13:07 New Bag gemcitabine (GEMZAR) 2,200 mg in sodium chloride 0.9 % 332.86 mL infusion 2,200 mg 665.7 mL/hr Infusion Therapy Visit on 04/03/2025 in [facility]    04/03/2025 13:43 New Bag CISplatin (PLATINOL) 43 mg in sodium chloride 0.9 % 318 mL infusion 43 mg 318 mL/hr Infusion Therapy Visit on 04/03/2025 in [facility]            Assessment:                                                      See above        Intervention/Education provided during outreach:                                                       See above , He will keep his apt on 4/17 as scheduled.    Confirmed patient has clinic and triage numbers    Signature:  Patricia Mack, RN

## 2025-04-17 ENCOUNTER — INFUSION THERAPY VISIT (OUTPATIENT)
Dept: INFUSION THERAPY | Facility: HOSPITAL | Age: 75
End: 2025-04-17
Attending: INTERNAL MEDICINE
Payer: COMMERCIAL

## 2025-04-17 ENCOUNTER — ONCOLOGY VISIT (OUTPATIENT)
Dept: ONCOLOGY | Facility: HOSPITAL | Age: 75
End: 2025-04-17
Attending: INTERNAL MEDICINE
Payer: COMMERCIAL

## 2025-04-17 ENCOUNTER — PATIENT OUTREACH (OUTPATIENT)
Dept: ONCOLOGY | Facility: HOSPITAL | Age: 75
End: 2025-04-17

## 2025-04-17 VITALS
WEIGHT: 200 LBS | SYSTOLIC BLOOD PRESSURE: 138 MMHG | HEART RATE: 84 BPM | HEIGHT: 77 IN | RESPIRATION RATE: 18 BRPM | DIASTOLIC BLOOD PRESSURE: 80 MMHG | TEMPERATURE: 98.4 F | OXYGEN SATURATION: 99 % | BODY MASS INDEX: 23.62 KG/M2

## 2025-04-17 DIAGNOSIS — C22.1 CHOLANGIOCARCINOMA (H): Primary | ICD-10-CM

## 2025-04-17 DIAGNOSIS — C22.1 CHOLANGIOCARCINOMA (H): ICD-10-CM

## 2025-04-17 DIAGNOSIS — R63.0 ANOREXIA: ICD-10-CM

## 2025-04-17 DIAGNOSIS — R11.2 CHEMOTHERAPY INDUCED NAUSEA AND VOMITING: Primary | ICD-10-CM

## 2025-04-17 DIAGNOSIS — T45.1X5A CHEMOTHERAPY INDUCED NAUSEA AND VOMITING: Primary | ICD-10-CM

## 2025-04-17 LAB
ALBUMIN SERPL BCG-MCNC: 3.2 G/DL (ref 3.5–5.2)
ALP SERPL-CCNC: 615 U/L (ref 40–150)
ALT SERPL W P-5'-P-CCNC: 43 U/L (ref 0–70)
ANION GAP SERPL CALCULATED.3IONS-SCNC: 11 MMOL/L (ref 7–15)
AST SERPL W P-5'-P-CCNC: 42 U/L (ref 0–45)
BASOPHILS # BLD AUTO: 0 10E3/UL (ref 0–0.2)
BASOPHILS NFR BLD AUTO: 0 %
BILIRUB SERPL-MCNC: 0.4 MG/DL
BUN SERPL-MCNC: 34.7 MG/DL (ref 8–23)
CALCIUM SERPL-MCNC: 11.4 MG/DL (ref 8.8–10.4)
CHLORIDE SERPL-SCNC: 102 MMOL/L (ref 98–107)
CREAT SERPL-MCNC: 1.63 MG/DL (ref 0.67–1.17)
EGFRCR SERPLBLD CKD-EPI 2021: 43 ML/MIN/1.73M2
EOSINOPHIL # BLD AUTO: 0.1 10E3/UL (ref 0–0.7)
EOSINOPHIL NFR BLD AUTO: 2 %
ERYTHROCYTE [DISTWIDTH] IN BLOOD BY AUTOMATED COUNT: 13.5 % (ref 10–15)
GLUCOSE SERPL-MCNC: 96 MG/DL (ref 70–99)
HCO3 SERPL-SCNC: 24 MMOL/L (ref 22–29)
HCT VFR BLD AUTO: 24.8 % (ref 40–53)
HGB BLD-MCNC: 8.2 G/DL (ref 13.3–17.7)
IMM GRANULOCYTES # BLD: 0.1 10E3/UL
IMM GRANULOCYTES NFR BLD: 4 %
LYMPHOCYTES # BLD AUTO: 0.5 10E3/UL (ref 0.8–5.3)
LYMPHOCYTES NFR BLD AUTO: 15 %
MAGNESIUM SERPL-MCNC: 2 MG/DL (ref 1.7–2.3)
MCH RBC QN AUTO: 29.1 PG (ref 26.5–33)
MCHC RBC AUTO-ENTMCNC: 33.1 G/DL (ref 31.5–36.5)
MCV RBC AUTO: 88 FL (ref 78–100)
MONOCYTES # BLD AUTO: 0.9 10E3/UL (ref 0–1.3)
MONOCYTES NFR BLD AUTO: 28 %
NEUTROPHILS # BLD AUTO: 1.6 10E3/UL (ref 1.6–8.3)
NEUTROPHILS NFR BLD AUTO: 51 %
NRBC # BLD AUTO: 0 10E3/UL
NRBC BLD AUTO-RTO: 0 /100
PLATELET # BLD AUTO: 455 10E3/UL (ref 150–450)
POTASSIUM SERPL-SCNC: 4.5 MMOL/L (ref 3.4–5.3)
PROT SERPL-MCNC: 6.4 G/DL (ref 6.4–8.3)
RBC # BLD AUTO: 2.82 10E6/UL (ref 4.4–5.9)
SODIUM SERPL-SCNC: 137 MMOL/L (ref 135–145)
T4 FREE SERPL-MCNC: 1.54 NG/DL (ref 0.9–1.7)
TSH SERPL DL<=0.005 MIU/L-ACNC: 5.7 UIU/ML (ref 0.3–4.2)
WBC # BLD AUTO: 3.1 10E3/UL (ref 4–11)

## 2025-04-17 PROCEDURE — 83735 ASSAY OF MAGNESIUM: CPT | Performed by: INTERNAL MEDICINE

## 2025-04-17 PROCEDURE — 250N000011 HC RX IP 250 OP 636: Performed by: INTERNAL MEDICINE

## 2025-04-17 PROCEDURE — 36591 DRAW BLOOD OFF VENOUS DEVICE: CPT | Performed by: INTERNAL MEDICINE

## 2025-04-17 PROCEDURE — 84443 ASSAY THYROID STIM HORMONE: CPT | Performed by: INTERNAL MEDICINE

## 2025-04-17 PROCEDURE — 82374 ASSAY BLOOD CARBON DIOXIDE: CPT | Performed by: INTERNAL MEDICINE

## 2025-04-17 PROCEDURE — 84439 ASSAY OF FREE THYROXINE: CPT | Performed by: INTERNAL MEDICINE

## 2025-04-17 PROCEDURE — 82565 ASSAY OF CREATININE: CPT | Performed by: INTERNAL MEDICINE

## 2025-04-17 PROCEDURE — 258N000003 HC RX IP 258 OP 636: Performed by: INTERNAL MEDICINE

## 2025-04-17 PROCEDURE — 85041 AUTOMATED RBC COUNT: CPT | Performed by: INTERNAL MEDICINE

## 2025-04-17 PROCEDURE — G0463 HOSPITAL OUTPT CLINIC VISIT: HCPCS | Performed by: INTERNAL MEDICINE

## 2025-04-17 PROCEDURE — 86301 IMMUNOASSAY TUMOR CA 19-9: CPT

## 2025-04-17 PROCEDURE — 84155 ASSAY OF PROTEIN SERUM: CPT | Performed by: INTERNAL MEDICINE

## 2025-04-17 PROCEDURE — 85004 AUTOMATED DIFF WBC COUNT: CPT | Performed by: INTERNAL MEDICINE

## 2025-04-17 RX ORDER — HEPARIN SODIUM (PORCINE) LOCK FLUSH IV SOLN 100 UNIT/ML 100 UNIT/ML
5 SOLUTION INTRAVENOUS
OUTPATIENT
Start: 2025-04-17

## 2025-04-17 RX ORDER — FENTANYL 25 UG/1
1 PATCH TRANSDERMAL
Qty: 10 PATCH | Refills: 0 | Status: SHIPPED | OUTPATIENT
Start: 2025-04-17

## 2025-04-17 RX ORDER — SODIUM CHLORIDE 9 MG/ML
INJECTION, SOLUTION INTRAVENOUS CONTINUOUS
Status: ACTIVE | OUTPATIENT
Start: 2025-04-17 | End: 2025-04-18

## 2025-04-17 RX ORDER — HEPARIN SODIUM (PORCINE) LOCK FLUSH IV SOLN 100 UNIT/ML 100 UNIT/ML
5 SOLUTION INTRAVENOUS
Status: DISCONTINUED | OUTPATIENT
Start: 2025-04-17 | End: 2025-04-17 | Stop reason: HOSPADM

## 2025-04-17 RX ADMIN — SODIUM CHLORIDE: 0.9 INJECTION, SOLUTION INTRAVENOUS at 10:06

## 2025-04-17 RX ADMIN — HEPARIN 5 ML: 100 SYRINGE at 11:10

## 2025-04-17 ASSESSMENT — PAIN SCALES - GENERAL: PAINLEVEL_OUTOF10: MODERATE PAIN (5)

## 2025-04-17 NOTE — LETTER
"4/17/2025      David Samaniego  [id number] [address] [address]      Dear Colleague,    Thank you for referring your patient, David Samaniego, to the [facility] [place]. Please see a copy of my visit note below.    Oncology Rooming Note    April 17, 2025 9:05 AM   David Samaniego is a 74 year old male who presents for:    Chief Complaint   Patient presents with     Oncology Clinic Visit     Cholangiocarcinoma      Initial Vitals: /80 (BP Location: Left arm, Patient Position: Sitting, Cuff Size: Adult Regular)   Pulse 84   Temp 98.4  F (36.9  C) (Tympanic)   Resp 18   Ht 1.943 m (6' 4.5\")   Wt 90.7 kg (200 lb)   SpO2 99%   BMI 24.03 kg/m   Estimated body mass index is 24.03 kg/m  as calculated from the following:    Height as of this encounter: 1.943 m (6' 4.5\").    Weight as of this encounter: 90.7 kg (200 lb). Body surface area is 2.21 meters squared.  Moderate Pain (5) Comment: Data Unavailable   No LMP for male patient.  Allergies reviewed: Yes  Medications reviewed: Yes    Medications: Medication refills not needed today.  Pharmacy name entered into [employer]:    [facility] - [address] [id number] - [place], [place] - [address] AT [facility]    Frailty Screening:   Is the patient here for a new oncology consult visit in cancer care? 2. No    PHQ9:  Did this patient require a PHQ9?: No      Clinical concerns:   Follow up labs.   Pt is in clinic with his wife Alec Black -Daughter, and son in law.        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  Dose of cisplatin decreased by 25% due to renal function        History of Present Illness  Patient is here in follow-up.  He was given his first cycle of chemotherapy 3 weeks ago and he is due for his second cycle today.  He however has had a lot of side effects he has not felt like eating and has lost about 17 pounds he is also been nauseous but he has not thrown up he was also having significant pain.  He was finally started on oxycodone 2 days ago and the pain is slightly better he is taking oxycodones 4 times a day.  Today is the first day that he feels a little better and he was able to walk without really getting tired.  He is also been dealing with constipation.  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     No Comments Provided     Enlarged prostate without lower urinary tract symptoms (luts)     No Comments Provided     Essential (primary) hypertension     No Comments Provided     Gastro-esophageal reflux disease without esophagitis     No Comments Provided     Hypothyroidism     No Comments Provided     Other specified congenital malformations of intestine     No Comments Provided     PONV (postoperative nausea and vomiting)     with surgeries in the 80's       Past Surgical History:   Procedure Laterality Date     BACK SURGERY   "    1986 and 1987     COLONOSCOPY      7/29/05,next due 2015     COLONOSCOPY  10/26/2015    Tubular adenoma,F/U 2020     COLONOSCOPY N/A 11/16/2020    F/U 2025 tubular adenomas     CYSTOSCOPY, TRANSURETHRAL RESECTION (TUR) PROSTATE, COMBINED N/A 07/30/2019    Procedure: CYSTOSCOPY, WITH TRANSURETHRAL RESECTION (TUR) PROSTATE;  Surgeon: Art Liu MD;  Location: [facility] OR     IR CHEST PORT PLACEMENT > 5 YRS OF AGE  3/10/2025     OTHER SURGICAL HISTORY      [id number],PARTIAL THYROIDECTOMY     OTHER SURGICAL HISTORY      [id number],ANESTHESIA ALERT,Notes no prior complications from anesthesia.:     [facility] TUR PROSTATE BIOPSIES         Physical Exam    /80 (BP Location: Left arm, Patient Position: Sitting, Cuff Size: Adult Regular)   Pulse 84   Temp 98.4  F (36.9  C) (Tympanic)   Resp 18   Ht 1.943 m (6' 4.5\")   Wt 90.7 kg (200 lb)   SpO2 99%   BMI 24.03 kg/m      General: alert, awake, not in acute distress  HEENT: Head: Normal, normocephalic, atraumatic.  Eye: Normal external eye, conjunctiva, lids cornea, DARY.  Nose: Normal external nose, mucus membranes and septum.  Pharynx: Normal buccal mucosa. Normal pharynx.  Neck / Thyroid: Supple, no masses, nodes, nodules or enlargement.  Lymphatics: No abnormally enlarged lymph nodes.  Chest: Normal chest wall and respirations. Clear to auscultation.  No crackles or rhonchi's  Heart: S1 S2 RRR, no murmur.   Abdomen: abdomen is soft without significant tenderness, masses, organomegaly or guarding  Extremities: normal strength, tone, and muscle mass  Skin: normal. no rash or abnormalities  CNS: non focal.    Lab Results    Recent Results (from the past 240 hours)   Basic metabolic panel    Collection Time: 04/10/25  9:44 AM   Result Value Ref Range    Sodium 135 135 - 145 mmol/L    Potassium 4.2 3.4 - 5.3 mmol/L    Chloride 102 98 - 107 mmol/L    Carbon Dioxide (CO2) 23 22 - 29 mmol/L    Anion Gap 10 7 - 15 mmol/L    Urea Nitrogen 28.9 (H) 8.0 - 23.0 mg/dL    Creatinine " 1.38 (H) 0.67 - 1.17 mg/dL    GFR Estimate 53 (L) >60 mL/min/1.73m2    Calcium 11.3 (H) 8.8 - 10.4 mg/dL    Glucose 97 70 - 99 mg/dL   CBC with platelets and differential    Collection Time: 04/10/25  9:44 AM   Result Value Ref Range    WBC Count 2.9 (L) 4.0 - 11.0 10e3/uL    RBC Count 3.05 (L) 4.40 - 5.90 10e6/uL    Hemoglobin 8.5 (L) 13.3 - 17.7 g/dL    Hematocrit 26.0 (L) 40.0 - 53.0 %    MCV 85 78 - 100 fL    MCH 27.9 26.5 - 33.0 pg    MCHC 32.7 31.5 - 36.5 g/dL    RDW 12.7 10.0 - 15.0 %    Platelet Count 65 (L) 150 - 450 10e3/uL    % Neutrophils 82 %    % Lymphocytes 14 %    % Monocytes 2 %    % Eosinophils 0 %    % Basophils 0 %    % Immature Granulocytes 2 %    NRBCs per 100 WBC 0 <1 /100    Absolute Neutrophils 2.4 1.6 - 8.3 10e3/uL    Absolute Lymphocytes 0.4 (L) 0.8 - 5.3 10e3/uL    Absolute Monocytes 0.1 0.0 - 1.3 10e3/uL    Absolute Eosinophils 0.0 0.0 - 0.7 10e3/uL    Absolute Basophils 0.0 0.0 - 0.2 10e3/uL    Absolute Immature Granulocytes 0.1 <=0.4 10e3/uL    Absolute NRBCs 0.0 10e3/uL   RBC and Platelet Morphology    Collection Time: 04/10/25  9:44 AM   Result Value Ref Range    RBC Morphology Confirmed RBC Indices     Platelet Assessment  Automated Count Confirmed. Platelet morphology is normal.     Automated Count Confirmed. Platelet morphology is normal.   Routine UA with micro reflex to culture    Collection Time: 04/10/25 10:54 AM    Specimen: Urine, Midstream   Result Value Ref Range    Color Urine Yellow Colorless, Straw, Light Yellow, Yellow    Appearance Urine Clear Clear    Glucose Urine Negative Negative mg/dL    Bilirubin Urine Negative Negative    Ketones Urine Negative Negative mg/dL    Specific Gravity Urine 1.017 1.001 - 1.030    Blood Urine Negative Negative    pH Urine 5.5 5.0 - 7.0    Protein Albumin Urine 30 (A) Negative mg/dL    Urobilinogen Urine Normal Normal mg/dL    Nitrite Urine Negative Negative    Leukocyte Esterase Urine Negative Negative    Mucus Urine Present (A) None  Seen /LPF    RBC Urine 1 <=2 /HPF    WBC Urine 1 <=5 /HPF    Squamous Epithelials Urine <1 <=1 /HPF   Comprehensive metabolic panel    Collection Time: 04/17/25  8:50 AM   Result Value Ref Range    Sodium 137 135 - 145 mmol/L    Potassium 4.5 3.4 - 5.3 mmol/L    Carbon Dioxide (CO2) 24 22 - 29 mmol/L    Anion Gap 11 7 - 15 mmol/L    Urea Nitrogen 34.7 (H) 8.0 - 23.0 mg/dL    Creatinine 1.63 (H) 0.67 - 1.17 mg/dL    GFR Estimate 43 (L) >60 mL/min/1.73m2    Calcium 11.4 (H) 8.8 - 10.4 mg/dL    Chloride 102 98 - 107 mmol/L    Glucose 96 70 - 99 mg/dL    Alkaline Phosphatase 615 (H) 40 - 150 U/L    AST 42 0 - 45 U/L    ALT 43 0 - 70 U/L    Protein Total 6.4 6.4 - 8.3 g/dL    Albumin 3.2 (L) 3.5 - 5.2 g/dL    Bilirubin Total 0.4 <=1.2 mg/dL   Magnesium    Collection Time: 04/17/25  8:50 AM   Result Value Ref Range    Magnesium 2.0 1.7 - 2.3 mg/dL   TSH with free T4 reflex    Collection Time: 04/17/25  8:50 AM   Result Value Ref Range    TSH 5.70 (H) 0.30 - 4.20 uIU/mL   CBC with platelets and differential    Collection Time: 04/17/25  8:50 AM   Result Value Ref Range    WBC Count 3.1 (L) 4.0 - 11.0 10e3/uL    RBC Count 2.82 (L) 4.40 - 5.90 10e6/uL    Hemoglobin 8.2 (L) 13.3 - 17.7 g/dL    Hematocrit 24.8 (L) 40.0 - 53.0 %    MCV 88 78 - 100 fL    MCH 29.1 26.5 - 33.0 pg    MCHC 33.1 31.5 - 36.5 g/dL    RDW 13.5 10.0 - 15.0 %    Platelet Count 455 (H) 150 - 450 10e3/uL    % Neutrophils 51 %    % Lymphocytes 15 %    % Monocytes 28 %    % Eosinophils 2 %    % Basophils 0 %    % Immature Granulocytes 4 %    NRBCs per 100 WBC 0 <1 /100    Absolute Neutrophils 1.6 1.6 - 8.3 10e3/uL    Absolute Lymphocytes 0.5 (L) 0.8 - 5.3 10e3/uL    Absolute Monocytes 0.9 0.0 - 1.3 10e3/uL    Absolute Eosinophils 0.1 0.0 - 0.7 10e3/uL    Absolute Basophils 0.0 0.0 - 0.2 10e3/uL    Absolute Immature Granulocytes 0.1 <=0.4 10e3/uL    Absolute NRBCs 0.0 10e3/uL         Imaging    MRI Lumbar spine w & w/o contrast    Result Date: 4/4/2025  EXAM:  MR LUMBAR SPINE W/O and W CONTRAST LOCATION: [facility] DATE: 4/4/2025 INDICATION: Cord compression syndrome (H). Cholangiocarcinoma (H). COMPARISON: CT abdomen 3/31/2025. PET CT 3/12/2025. Lumbar spine MRI 3/7/2012. CONTRAST: 9.59 mL Gadavist. TECHNIQUE: Routine Lumbar Spine MRI without and with IV contrast. FINDINGS: NOMENCLATURE: Five lumbar-type vertebral bodies. ALIGNMENT: Minimal L4-L5, L3-L4 and L2-L3 retrolisthesis. BONES: Vertebral body heights are unremarkable. There are mild presumably degenerative endplate contour irregularities. Marrow signal is heterogeneous but without specific evidence of a marrow-replacing process. The known metastatic lesion of the right iliac crest is excluded from the field-of-view. No evidence for pathologic marrow edema or enhancement. DISCS: Mild to moderate multilevel spondylosis described in further detail below. SPINAL CORD: The conus terminates at T12-L1. No signal abnormalities of the imaged cord or cauda equina nerve roots are demonstrated. SPINAL CANAL: Moderate L3-L4 spinal canal stenosis. PARASPINAL SOFT TISSUES: Unremarkable. ABDOMINAL CAVITY: No acute abnormality demonstrated within technique limitations. SIGNIFICANT FINDINGS BY LEVEL: T12-L1: Normal disc height and signal. No herniation. Normal facets. No spinal canal or neural foraminal stenosis. L1-L2: Mild intervertebral disc height loss. Disc desiccation. Trace bulge. No significant facet arthropathy. No significant stenosis of the spinal canal or neural foramina. L2-L3: Mild intervertebral disc height loss. Disc desiccation. Shallow bulge. Mild facet arthropathy. Mild spinal canal stenosis. Moderate right and mild left neural foraminal stenosis. L3-L4: Mild intervertebral disc height loss. Slight desiccation. Shallow bulge and left paracentral protrusion. Moderate facet arthropathy. Ligamentum flavum thickening. Moderate spinal canal stenosis. Moderate bilateral lateral recess  stenosis. Mild bilateral neural foraminal stenosis. L4-L5: Advanced intervertebral disc height loss. Shallow bulge. Interbody spurring. Moderate facet arthropathy. Mild spinal canal stenosis. Prior left hemilaminotomy. Mild to moderate left lateral recess stenosis encroaching upon the descending left L5 nerve root. Moderate right and moderate-to-severe left neural foraminal stenosis. L5-S1: Normal disc height and signal. No herniation. Mild facet arthropathy. Prior right hemilaminectomy. No spinal canal or neural foraminal stenosis.     IMPRESSION: 1.  No evidence for metastatic disease of the lumbar spine. A known metastatic lesion of the right iliac crest is excluded from the field-of-view. 2.  No evidence for cord compression. 3.  At L2-L3, there is moderate right neural foraminal stenosis. 4.  At L3-L4, there is moderate spinal canal stenosis and moderate bilateral lateral recess stenosis encroaching upon the descending L4 nerve roots. 5.  At L4-L5, there is mild-to-moderate left lateral recess stenosis encroaching upon the descending left L5 nerve root. There is moderate right and moderate-to-severe left neural foraminal stenosis. 6.  Additional degenerative changes as above.    CT Abdomen Pelvis w/o Contrast    Result Date: 3/31/2025  EXAM: CT ABDOMEN PELVIS W/O CONTRAST LOCATION: [facility] DATE: 3/31/2025 INDICATION: Abdominal pain. History of cholangiocarcinoma. No bowel movement for 5 days. Small bowel obstruction. COMPARISON: 1. Whole-body PET/CT 3/12/2025. 2. CT abdomen and pelvis with IV contrast 2/14/2025. TECHNIQUE: CT scan of the abdomen and pelvis was performed without IV contrast. Multiplanar reformats were obtained. Dose reduction techniques were used. CONTRAST: None. FINDINGS: LOWER CHEST: A few strands of platelike atelectasis have developed in the lower lungs. No pleural effusion on either side. Normal cardiac size. Hypodensity of the cardiac ventricles suggests  anemia. Coronary artery calcifications. Trace pericardial effusion. HEPATOBILIARY: Large confluent central hepatic hypodense mass, interval confluence of multiple rounded lesions seen on prior studies measuring a conglomerate 14.5 cm AP x 10 cm transversely x 12.7 cm CC dimension (image 70, series 3, image 51, series 5).  Intrahepatic segmental biliary ectasia due to central masses. The liver is enlarged, length of the right lobe measures 28 cm (image 45, series 5), previously 26 cm. Dependent calcified gallstones without adjacent inflammatory changes. Small amount of ascites adjacent to the liver, unchanged. PANCREAS: Normal. SPLEEN: Enlarged without discrete lesion, AP length measures 17.6 cm (image 28, series 2), previously 14.3 cm. ADRENAL GLANDS: Normal. KIDNEYS/BLADDER: Right renal cortical cysts, no specific follow-up required. No urinary tract calculi. Both kidneys are negative for hydronephrosis or hydroureter. Partially distended urinary bladder. Prostatomegaly bulging into the bladder base. BOWEL: Moderate amount of formed stool material within normal caliber redundant colon, without mechanical obstruction or free gas. Small amount of ascites. LYMPH NODES: Shotty subcentimeter retroperitoneal nodes. VASCULATURE: Atherosclerotic and ectatic distal abdominal aorta measuring 2.4 x 2.4 cm (image 56, series 2). Normal caliber IVC. PELVIC ORGANS: Prostatomegaly. Small amount of ascites. Atherosclerotic changes. MUSCULOSKELETAL: Mild diffuse body wall edema. Degenerative changes involving the spine and joints of the pelvis.     IMPRESSION: 1.  Large confluent central hepatic hypodense mass, interval confluence of multiple rounded lesions seen previously. The liver is enlarged, length of the right lobe measures 28 cm. Foci of intrahepatic biliary ectasia attributable to confluent central mass. Small amount of ascites adjacent to the liver, stable. Cholelithiasis. Moderate splenomegaly, more apparent on current  study. 2.  Moderate amount of formed stool material within normal caliber colon. No mechanical obstruction or free gas. 3.  Right renal cortical cysts, these require no specific follow-up. No urinary tract calculi, hydronephrosis or hydroureter. Prostatomegaly bulging into the bladder base. 4.  Normal cardiac size. Coronary artery calcifications. Hypodensity of the cardiac ventricles suggests anemia. Atherosclerotic and mildly ectatic abdominal aorta. No aneurysm. 5.  Mild diffuse body wall edema. Degenerative changes involving the spine and joints of the pelvis.          Assessment and Plan    Metastatic intrahepatic cholangiocarcinoma    Patient is here in follow-up.  He is 3 weeks into his first cycle of chemotherapy and is due for his second cycle today.  He however has continued to lose weight and has lost about 17 pounds in the last 3 weeks has also been nauseous and feels very weak.  He is just starting to feel better in the last 2 days.  I think he will be in his best interest to delay chemotherapy by 1 week.  This will give him an  of opportunity to gain strength and hopefully gain some weight.  His chemotherapy will be delayed by 1 week.    Anorexia and nausea  Patient was complaining of anorexia and nausea he was started on ondansetron.  He has lost about 17 pounds.  We talked about various strategies that he can employ and I would like him to stop losing weight and gain some weight.  He is being given a week off for that.  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  Patient's daughter does has BRCA mutation so this is most likely a germline mutation.  We can consider PARP inhibitors in the future as maintenance therapy    Cancer pain  Patient has significant pain from his cancer.  He started oxycodone a couple days ago and the pain is slightly better he is taking 4 pills a day.  I will start him on a fentanyl  patch 25 mcg along with oxycodone.  We also talked about that this may increase his constipation and recommendations were given to him about his bowel regimen    Signed by: Aylin Gaviria MD        CC: Derrick Mckeon MD       Again, thank you for allowing me to participate in the care of your patient.        Sincerely,        Aylin Gaviria MD    Electronically signed

## 2025-04-17 NOTE — PROGRESS NOTES
[facility]: Cancer Care Follow-Up Note                                    Discussion with Patient:                                                    Met with Anuel ,and his wife, when he was in the infusion bay receiving fluids.Chemo has been delayed one week due to weight loss/performance status. He shared that very little tastes good to him textures and flavors are off-putting. He will continue to experiment with different flavors and textures to find foods that are agreeable to him. He has already met with the dietician and has learned strategies, just difficulty with implementation. He thinks that he is drinking about 40 oz of water plus boost every day. He will try to increase fluids through alternative options including ice-chips, popsicles, broth, jello, juice, gatorade, propel, etc.  He was prescribed fentanyl patch today and will  from the pharmacy. He understands that although it will start working once applied it will not be at full therapeutic dose for at least a day. During that time he will still need to cover with oxycodone and then he will be able to taper off and use just as needed for breakthrough pain.he discussed bowel regimen with stas Cruz 1-2 times per day and either miralax or lactulose 1-2 times per day, depending on stools. He was encouraged to call our nurse triage if any of his symptoms are not well controlled as we can help further manage. He stated understanding and appreciation. He has our contact information and will check in with him next week  when he is at clinic.       Dates of Treatment:                                                      Infusion given in last 28 days       Administered MAR Action Medication Dose Rate Visit    03/27/2025 10:18 New Bag durvalumab (IMFINZI) 1,500 mg in sodium chloride 0.9 % 140 mL infusion 1,500 mg 140 mL/hr Infusion Therapy Visit on 03/27/2025 in [facility]    03/27/2025 11:20 New Bag gemcitabine  (GEMZAR) 2,200 mg in sodium chloride 0.9 % 332.86 mL infusion 2,200 mg 665.7 mL/hr Infusion Therapy Visit on 03/27/2025 in [facility]    03/27/2025 11:54 New Bag CISplatin (PLATINOL) 43 mg in sodium chloride 0.9 % 318 mL infusion 43 mg 318 mL/hr Infusion Therapy Visit on 03/27/2025 in [facility]    04/03/2025 13:07 New Bag gemcitabine (GEMZAR) 2,200 mg in sodium chloride 0.9 % 332.86 mL infusion 2,200 mg 665.7 mL/hr Infusion Therapy Visit on 04/03/2025 in [facility]    04/03/2025 13:43 New Bag CISplatin (PLATINOL) 43 mg in sodium chloride 0.9 % 318 mL infusion 43 mg 318 mL/hr Infusion Therapy Visit on 04/03/2025 in [facility]            Assessment:                                                      See above        Intervention/Education provided during outreach:                                                       See above    Confirmed patient has clinic and triage numbers    Signature:  Patricia Mack, RN

## 2025-04-17 NOTE — PROGRESS NOTES
Infusion Nursing Note:  David Samaniego presents today for IV fluids (day 1 cycle 2 chemo deferred until next week).    Patient seen by provider today: Yes: Dr. Gaviria   present during visit today: Not Applicable.    Note: Anuel received 1 liter of Normal Saline today. His day 1 cycle 2 chemo delayed until next week per Dr. Gaviria..      Intravenous Access:  Labs drawn without difficulty.  Implanted Port.    Treatment Conditions:  Lab Results   Component Value Date    HGB 8.2 (L) 04/17/2025    WBC 3.1 (L) 04/17/2025    ANEUTAUTO 1.6 04/17/2025     (H) 04/17/2025        Lab Results   Component Value Date     04/17/2025    POTASSIUM 4.5 04/17/2025    MAG 2.0 04/17/2025    CR 1.63 (H) 04/17/2025    BENITO 11.4 (H) 04/17/2025    BILITOTAL 0.4 04/17/2025    ALBUMIN 3.2 (L) 04/17/2025    ALT 43 04/17/2025    AST 42 04/17/2025         Post Infusion Assessment:  Patient tolerated infusion without incident.  Blood return noted pre and post infusion.       Discharge Plan:   Patient and/or family verbalized understanding of discharge instructions and all questions answered.  AVS to patient via [employer].  Patient will return 4/24 for next appointment.   Patient discharged in stable condition accompanied by: wife.  Departure Mode: Ambulatory.      Monica Carter RN

## 2025-04-17 NOTE — PROGRESS NOTES
"Oncology Rooming Note    April 17, 2025 9:05 AM   David Samaniego is a 74 year old male who presents for:    Chief Complaint   Patient presents with    Oncology Clinic Visit     Cholangiocarcinoma      Initial Vitals: /80 (BP Location: Left arm, Patient Position: Sitting, Cuff Size: Adult Regular)   Pulse 84   Temp 98.4  F (36.9  C) (Tympanic)   Resp 18   Ht 1.943 m (6' 4.5\")   Wt 90.7 kg (200 lb)   SpO2 99%   BMI 24.03 kg/m   Estimated body mass index is 24.03 kg/m  as calculated from the following:    Height as of this encounter: 1.943 m (6' 4.5\").    Weight as of this encounter: 90.7 kg (200 lb). Body surface area is 2.21 meters squared.  Moderate Pain (5) Comment: Data Unavailable   No LMP for male patient.  Allergies reviewed: Yes  Medications reviewed: Yes    Medications: Medication refills not needed today.  Pharmacy name entered into Proficiency:    [facility] - [address] [id number] [address] AT [facility] & [address]     Frailty Screening:   Is the patient here for a new oncology consult visit in cancer care? 2. No    PHQ9:  Did this patient require a PHQ9?: No      Clinical concerns:   Follow up labs.   Pt is in clinic with his wife Alec Black -Daughter, and son in law.        Farzaneh Alarcon MA            "

## 2025-04-17 NOTE — PROGRESS NOTES
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  Dose of cisplatin decreased by 25% due to renal function        History of Present Illness  Patient is here in follow-up.  He was given his first cycle of chemotherapy 3 weeks ago and he is due for his second cycle today.  He however has had a lot of side effects he has not felt like eating and has lost about 17 pounds he is also been nauseous but he has not thrown up he was also having significant pain.  He was finally started on oxycodone 2 days ago and the pain is slightly better he is taking oxycodones 4 times a day.  Today is the first day that he feels a little better and he was able to walk without really getting tired.  He is also been dealing with constipation.  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     No Comments Provided    Enlarged prostate without lower urinary tract symptoms (luts)     No Comments Provided    Essential (primary) hypertension     No  "Comments Provided    Gastro-esophageal reflux disease without esophagitis     No Comments Provided    Hypothyroidism     No Comments Provided    Other specified congenital malformations of intestine     No Comments Provided    PONV (postoperative nausea and vomiting)     with surgeries in the 80's       Past Surgical History:   Procedure Laterality Date    BACK SURGERY      1986 and 1987    COLONOSCOPY      7/29/05,next due 2015    COLONOSCOPY  10/26/2015    Tubular adenoma,F/U 2020    COLONOSCOPY N/A 11/16/2020    F/U 2025 tubular adenomas    CYSTOSCOPY, TRANSURETHRAL RESECTION (TUR) PROSTATE, COMBINED N/A 07/30/2019    Procedure: CYSTOSCOPY, WITH TRANSURETHRAL RESECTION (TUR) PROSTATE;  Surgeon: Art Liu MD;  Location: [facility] OR    IR CHEST PORT PLACEMENT > 5 YRS OF AGE  3/10/2025    OTHER SURGICAL HISTORY      [id number],PARTIAL THYROIDECTOMY    OTHER SURGICAL HISTORY      [id number],ANESTHESIA ALERT,Notes no prior complications from anesthesia.:    [facility] TUR PROSTATE BIOPSIES         Physical Exam    /80 (BP Location: Left arm, Patient Position: Sitting, Cuff Size: Adult Regular)   Pulse 84   Temp 98.4  F (36.9  C) (Tympanic)   Resp 18   Ht 1.943 m (6' 4.5\")   Wt 90.7 kg (200 lb)   SpO2 99%   BMI 24.03 kg/m      General: alert, awake, not in acute distress  HEENT: Head: Normal, normocephalic, atraumatic.  Eye: Normal external eye, conjunctiva, lids cornea, DARY.  Nose: Normal external nose, mucus membranes and septum.  Pharynx: Normal buccal mucosa. Normal pharynx.  Neck / Thyroid: Supple, no masses, nodes, nodules or enlargement.  Lymphatics: No abnormally enlarged lymph nodes.  Chest: Normal chest wall and respirations. Clear to auscultation.  No crackles or rhonchi's  Heart: S1 S2 RRR, no murmur.   Abdomen: abdomen is soft without significant tenderness, masses, organomegaly or guarding  Extremities: normal strength, tone, and muscle mass  Skin: normal. no rash or abnormalities  CNS: non focal.    Lab " Results    Recent Results (from the past 240 hours)   Basic metabolic panel    Collection Time: 04/10/25  9:44 AM   Result Value Ref Range    Sodium 135 135 - 145 mmol/L    Potassium 4.2 3.4 - 5.3 mmol/L    Chloride 102 98 - 107 mmol/L    Carbon Dioxide (CO2) 23 22 - 29 mmol/L    Anion Gap 10 7 - 15 mmol/L    Urea Nitrogen 28.9 (H) 8.0 - 23.0 mg/dL    Creatinine 1.38 (H) 0.67 - 1.17 mg/dL    GFR Estimate 53 (L) >60 mL/min/1.73m2    Calcium 11.3 (H) 8.8 - 10.4 mg/dL    Glucose 97 70 - 99 mg/dL   CBC with platelets and differential    Collection Time: 04/10/25  9:44 AM   Result Value Ref Range    WBC Count 2.9 (L) 4.0 - 11.0 10e3/uL    RBC Count 3.05 (L) 4.40 - 5.90 10e6/uL    Hemoglobin 8.5 (L) 13.3 - 17.7 g/dL    Hematocrit 26.0 (L) 40.0 - 53.0 %    MCV 85 78 - 100 fL    MCH 27.9 26.5 - 33.0 pg    MCHC 32.7 31.5 - 36.5 g/dL    RDW 12.7 10.0 - 15.0 %    Platelet Count 65 (L) 150 - 450 10e3/uL    % Neutrophils 82 %    % Lymphocytes 14 %    % Monocytes 2 %    % Eosinophils 0 %    % Basophils 0 %    % Immature Granulocytes 2 %    NRBCs per 100 WBC 0 <1 /100    Absolute Neutrophils 2.4 1.6 - 8.3 10e3/uL    Absolute Lymphocytes 0.4 (L) 0.8 - 5.3 10e3/uL    Absolute Monocytes 0.1 0.0 - 1.3 10e3/uL    Absolute Eosinophils 0.0 0.0 - 0.7 10e3/uL    Absolute Basophils 0.0 0.0 - 0.2 10e3/uL    Absolute Immature Granulocytes 0.1 <=0.4 10e3/uL    Absolute NRBCs 0.0 10e3/uL   RBC and Platelet Morphology    Collection Time: 04/10/25  9:44 AM   Result Value Ref Range    RBC Morphology Confirmed RBC Indices     Platelet Assessment  Automated Count Confirmed. Platelet morphology is normal.     Automated Count Confirmed. Platelet morphology is normal.   Routine UA with micro reflex to culture    Collection Time: 04/10/25 10:54 AM    Specimen: Urine, Midstream   Result Value Ref Range    Color Urine Yellow Colorless, Straw, Light Yellow, Yellow    Appearance Urine Clear Clear    Glucose Urine Negative Negative mg/dL    Bilirubin Urine  Negative Negative    Ketones Urine Negative Negative mg/dL    Specific Gravity Urine 1.017 1.001 - 1.030    Blood Urine Negative Negative    pH Urine 5.5 5.0 - 7.0    Protein Albumin Urine 30 (A) Negative mg/dL    Urobilinogen Urine Normal Normal mg/dL    Nitrite Urine Negative Negative    Leukocyte Esterase Urine Negative Negative    Mucus Urine Present (A) None Seen /LPF    RBC Urine 1 <=2 /HPF    WBC Urine 1 <=5 /HPF    Squamous Epithelials Urine <1 <=1 /HPF   Comprehensive metabolic panel    Collection Time: 04/17/25  8:50 AM   Result Value Ref Range    Sodium 137 135 - 145 mmol/L    Potassium 4.5 3.4 - 5.3 mmol/L    Carbon Dioxide (CO2) 24 22 - 29 mmol/L    Anion Gap 11 7 - 15 mmol/L    Urea Nitrogen 34.7 (H) 8.0 - 23.0 mg/dL    Creatinine 1.63 (H) 0.67 - 1.17 mg/dL    GFR Estimate 43 (L) >60 mL/min/1.73m2    Calcium 11.4 (H) 8.8 - 10.4 mg/dL    Chloride 102 98 - 107 mmol/L    Glucose 96 70 - 99 mg/dL    Alkaline Phosphatase 615 (H) 40 - 150 U/L    AST 42 0 - 45 U/L    ALT 43 0 - 70 U/L    Protein Total 6.4 6.4 - 8.3 g/dL    Albumin 3.2 (L) 3.5 - 5.2 g/dL    Bilirubin Total 0.4 <=1.2 mg/dL   Magnesium    Collection Time: 04/17/25  8:50 AM   Result Value Ref Range    Magnesium 2.0 1.7 - 2.3 mg/dL   TSH with free T4 reflex    Collection Time: 04/17/25  8:50 AM   Result Value Ref Range    TSH 5.70 (H) 0.30 - 4.20 uIU/mL   CBC with platelets and differential    Collection Time: 04/17/25  8:50 AM   Result Value Ref Range    WBC Count 3.1 (L) 4.0 - 11.0 10e3/uL    RBC Count 2.82 (L) 4.40 - 5.90 10e6/uL    Hemoglobin 8.2 (L) 13.3 - 17.7 g/dL    Hematocrit 24.8 (L) 40.0 - 53.0 %    MCV 88 78 - 100 fL    MCH 29.1 26.5 - 33.0 pg    MCHC 33.1 31.5 - 36.5 g/dL    RDW 13.5 10.0 - 15.0 %    Platelet Count 455 (H) 150 - 450 10e3/uL    % Neutrophils 51 %    % Lymphocytes 15 %    % Monocytes 28 %    % Eosinophils 2 %    % Basophils 0 %    % Immature Granulocytes 4 %    NRBCs per 100 WBC 0 <1 /100    Absolute Neutrophils 1.6 1.6  - 8.3 10e3/uL    Absolute Lymphocytes 0.5 (L) 0.8 - 5.3 10e3/uL    Absolute Monocytes 0.9 0.0 - 1.3 10e3/uL    Absolute Eosinophils 0.1 0.0 - 0.7 10e3/uL    Absolute Basophils 0.0 0.0 - 0.2 10e3/uL    Absolute Immature Granulocytes 0.1 <=0.4 10e3/uL    Absolute NRBCs 0.0 10e3/uL         Imaging    MRI Lumbar spine w & w/o contrast    Result Date: 4/4/2025  EXAM: MR LUMBAR SPINE W/O and W CONTRAST LOCATION: [facility] DATE: 4/4/2025 INDICATION: Cord compression syndrome (H). Cholangiocarcinoma (H). COMPARISON: CT abdomen 3/31/2025. PET CT 3/12/2025. Lumbar spine MRI 3/7/2012. CONTRAST: 9.59 mL Gadavist. TECHNIQUE: Routine Lumbar Spine MRI without and with IV contrast. FINDINGS: NOMENCLATURE: Five lumbar-type vertebral bodies. ALIGNMENT: Minimal L4-L5, L3-L4 and L2-L3 retrolisthesis. BONES: Vertebral body heights are unremarkable. There are mild presumably degenerative endplate contour irregularities. Marrow signal is heterogeneous but without specific evidence of a marrow-replacing process. The known metastatic lesion of the right iliac crest is excluded from the field-of-view. No evidence for pathologic marrow edema or enhancement. DISCS: Mild to moderate multilevel spondylosis described in further detail below. SPINAL CORD: The conus terminates at T12-L1. No signal abnormalities of the imaged cord or cauda equina nerve roots are demonstrated. SPINAL CANAL: Moderate L3-L4 spinal canal stenosis. PARASPINAL SOFT TISSUES: Unremarkable. ABDOMINAL CAVITY: No acute abnormality demonstrated within technique limitations. SIGNIFICANT FINDINGS BY LEVEL: T12-L1: Normal disc height and signal. No herniation. Normal facets. No spinal canal or neural foraminal stenosis. L1-L2: Mild intervertebral disc height loss. Disc desiccation. Trace bulge. No significant facet arthropathy. No significant stenosis of the spinal canal or neural foramina. L2-L3: Mild intervertebral disc height loss. Disc desiccation.  Shallow bulge. Mild facet arthropathy. Mild spinal canal stenosis. Moderate right and mild left neural foraminal stenosis. L3-L4: Mild intervertebral disc height loss. Slight desiccation. Shallow bulge and left paracentral protrusion. Moderate facet arthropathy. Ligamentum flavum thickening. Moderate spinal canal stenosis. Moderate bilateral lateral recess stenosis. Mild bilateral neural foraminal stenosis. L4-L5: Advanced intervertebral disc height loss. Shallow bulge. Interbody spurring. Moderate facet arthropathy. Mild spinal canal stenosis. Prior left hemilaminotomy. Mild to moderate left lateral recess stenosis encroaching upon the descending left L5 nerve root. Moderate right and moderate-to-severe left neural foraminal stenosis. L5-S1: Normal disc height and signal. No herniation. Mild facet arthropathy. Prior right hemilaminectomy. No spinal canal or neural foraminal stenosis.     IMPRESSION: 1.  No evidence for metastatic disease of the lumbar spine. A known metastatic lesion of the right iliac crest is excluded from the field-of-view. 2.  No evidence for cord compression. 3.  At L2-L3, there is moderate right neural foraminal stenosis. 4.  At L3-L4, there is moderate spinal canal stenosis and moderate bilateral lateral recess stenosis encroaching upon the descending L4 nerve roots. 5.  At L4-L5, there is mild-to-moderate left lateral recess stenosis encroaching upon the descending left L5 nerve root. There is moderate right and moderate-to-severe left neural foraminal stenosis. 6.  Additional degenerative changes as above.    CT Abdomen Pelvis w/o Contrast    Result Date: 3/31/2025  EXAM: CT ABDOMEN PELVIS W/O CONTRAST LOCATION: [facility] DATE: 3/31/2025 INDICATION: Abdominal pain. History of cholangiocarcinoma. No bowel movement for 5 days. Small bowel obstruction. COMPARISON: 1. Whole-body PET/CT 3/12/2025. 2. CT abdomen and pelvis with IV contrast 2/14/2025. TECHNIQUE: CT scan  of the abdomen and pelvis was performed without IV contrast. Multiplanar reformats were obtained. Dose reduction techniques were used. CONTRAST: None. FINDINGS: LOWER CHEST: A few strands of platelike atelectasis have developed in the lower lungs. No pleural effusion on either side. Normal cardiac size. Hypodensity of the cardiac ventricles suggests anemia. Coronary artery calcifications. Trace pericardial effusion. HEPATOBILIARY: Large confluent central hepatic hypodense mass, interval confluence of multiple rounded lesions seen on prior studies measuring a conglomerate 14.5 cm AP x 10 cm transversely x 12.7 cm CC dimension (image 70, series 3, image 51, series 5).  Intrahepatic segmental biliary ectasia due to central masses. The liver is enlarged, length of the right lobe measures 28 cm (image 45, series 5), previously 26 cm. Dependent calcified gallstones without adjacent inflammatory changes. Small amount of ascites adjacent to the liver, unchanged. PANCREAS: Normal. SPLEEN: Enlarged without discrete lesion, AP length measures 17.6 cm (image 28, series 2), previously 14.3 cm. ADRENAL GLANDS: Normal. KIDNEYS/BLADDER: Right renal cortical cysts, no specific follow-up required. No urinary tract calculi. Both kidneys are negative for hydronephrosis or hydroureter. Partially distended urinary bladder. Prostatomegaly bulging into the bladder base. BOWEL: Moderate amount of formed stool material within normal caliber redundant colon, without mechanical obstruction or free gas. Small amount of ascites. LYMPH NODES: Shotty subcentimeter retroperitoneal nodes. VASCULATURE: Atherosclerotic and ectatic distal abdominal aorta measuring 2.4 x 2.4 cm (image 56, series 2). Normal caliber IVC. PELVIC ORGANS: Prostatomegaly. Small amount of ascites. Atherosclerotic changes. MUSCULOSKELETAL: Mild diffuse body wall edema. Degenerative changes involving the spine and joints of the pelvis.     IMPRESSION: 1.  Large confluent central  hepatic hypodense mass, interval confluence of multiple rounded lesions seen previously. The liver is enlarged, length of the right lobe measures 28 cm. Foci of intrahepatic biliary ectasia attributable to confluent central mass. Small amount of ascites adjacent to the liver, stable. Cholelithiasis. Moderate splenomegaly, more apparent on current study. 2.  Moderate amount of formed stool material within normal caliber colon. No mechanical obstruction or free gas. 3.  Right renal cortical cysts, these require no specific follow-up. No urinary tract calculi, hydronephrosis or hydroureter. Prostatomegaly bulging into the bladder base. 4.  Normal cardiac size. Coronary artery calcifications. Hypodensity of the cardiac ventricles suggests anemia. Atherosclerotic and mildly ectatic abdominal aorta. No aneurysm. 5.  Mild diffuse body wall edema. Degenerative changes involving the spine and joints of the pelvis.          Assessment and Plan    Metastatic intrahepatic cholangiocarcinoma    Patient is here in follow-up.  He is 3 weeks into his first cycle of chemotherapy and is due for his second cycle today.  He however has continued to lose weight and has lost about 17 pounds in the last 3 weeks has also been nauseous and feels very weak.  He is just starting to feel better in the last 2 days.  I think he will be in his best interest to delay chemotherapy by 1 week.  This will give him an  of opportunity to gain strength and hopefully gain some weight.  His chemotherapy will be delayed by 1 week.    Anorexia and nausea  Patient was complaining of anorexia and nausea he was started on ondansetron.  He has lost about 17 pounds.  We talked about various strategies that he can employ and I would like him to stop losing weight and gain some weight.  He is being given a week off for that.  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   Patient's daughter does has BRCA mutation so this is most likely a germline mutation.  We can consider PARP inhibitors in the future as maintenance therapy    Cancer pain  Patient has significant pain from his cancer.  He started oxycodone a couple days ago and the pain is slightly better he is taking 4 pills a day.  I will start him on a fentanyl patch 25 mcg along with oxycodone.  We also talked about that this may increase his constipation and recommendations were given to him about his bowel regimen    Signed by: Aylin Gaviria MD        CC: Derrick Mckeon MD

## 2025-04-19 LAB — CANCER AG19-9 SERPL IA-ACNC: 74 U/ML

## 2025-04-21 ENCOUNTER — TELEPHONE (OUTPATIENT)
Dept: ONCOLOGY | Facility: HOSPITAL | Age: 75
End: 2025-04-21
Payer: COMMERCIAL

## 2025-04-21 NOTE — TELEPHONE ENCOUNTER
Return call placed to patient. Per Dr. Gaviria patient should continue to use oxycodone for pain as needed. He should increase Miralax to twice daily, if no bowel movement he may need a suppository. Patient will monitor symptoms and call with any new or worsening symptoms, or if not improving.     Leonila Bhagat RN

## 2025-04-21 NOTE — TELEPHONE ENCOUNTER
"Call placed to patient's wife Sofia after message was left on Friday evening. She reports that patient had put the fentanyl patch on Friday around 4:45 PM. He started to not feel well with it, feeling \"drunk\" and having abdominal discomfort. Patient spoke with Dr. Bender on Saturday and was instructed to take the patch off. Sofia says that patient is having difficulty describing the abdominal discomfort he is having, maybe more of a pressure feeling. He does have some nausea and is taking zofran, he has not had any vomiting. Patient has not had a bowel movement for 3 days, he is taking Senna twice daily along with Miralax. Patient had close to 64 ounces of fluids yesterday but only ate small amounts. Patient does have a follow up in clinic this Thursday 4/24.    Leonila Bhagat RN  "

## 2025-04-23 ENCOUNTER — TELEPHONE (OUTPATIENT)
Dept: ONCOLOGY | Facility: HOSPITAL | Age: 75
End: 2025-04-23
Payer: COMMERCIAL

## 2025-04-23 DIAGNOSIS — F41.9 ANXIETY: Primary | ICD-10-CM

## 2025-04-23 RX ORDER — LORAZEPAM 0.5 MG/1
0.5 TABLET ORAL EVERY 8 HOURS PRN
Qty: 30 TABLET | Refills: 0 | Status: SHIPPED | OUTPATIENT
Start: 2025-04-23

## 2025-04-23 NOTE — TELEPHONE ENCOUNTER
Patient and spouse call in, with multiple concerns:  Nausea, constipation- pretty much uncontrolled. Patient has both Compazine and Zofran. Using Zofran but not as directed, as it causes constipation.    His last bowel movement was 5 to 7 days. Stool was firm and small.  He is taking Senna-S two tabs in AM and two tabs PM. Lactulose replaced Miralax.  Patient has been able to use Lactulose once daily. He said with every breath he feels as though he is going to vomit.  He has been able to take Ensure 1 to 2 cartons daily, drinking about 32 oz of water.  He feels as though he is getting worse rapidly.   Discussed thoughts on Hospice Consult and they both said they have discussed this as a couple and would be interested in further discussion.  He has appointment tomorrow.  RN discussed with Dr. Gaviria, he is willing to prescribe Lorazepam 0.5 mg one tab three times daily prn for nausea. He also wants patient to try magnesium citrate today, even 1/2 bottle. Can also use Ducolax suppository.  Dr. Gaviria would like patient to keep appointment tomorrow. Can check labs and possibly give IV fluids and or IV anti-emetics. Will also have discussion with patient on how to move forward.   The patient was contacted and is aware of plan and verbalizes agreement.  Franny Jordan RN

## 2025-04-24 ENCOUNTER — ONCOLOGY VISIT (OUTPATIENT)
Dept: ONCOLOGY | Facility: HOSPITAL | Age: 75
End: 2025-04-24
Attending: INTERNAL MEDICINE
Payer: COMMERCIAL

## 2025-04-24 ENCOUNTER — INFUSION THERAPY VISIT (OUTPATIENT)
Dept: INFUSION THERAPY | Facility: HOSPITAL | Age: 75
End: 2025-04-24
Attending: INTERNAL MEDICINE
Payer: COMMERCIAL

## 2025-04-24 ENCOUNTER — PATIENT OUTREACH (OUTPATIENT)
Dept: ONCOLOGY | Facility: HOSPITAL | Age: 75
End: 2025-04-24

## 2025-04-24 VITALS
WEIGHT: 195 LBS | TEMPERATURE: 96.9 F | SYSTOLIC BLOOD PRESSURE: 112 MMHG | HEIGHT: 77 IN | HEART RATE: 83 BPM | RESPIRATION RATE: 18 BRPM | OXYGEN SATURATION: 97 % | DIASTOLIC BLOOD PRESSURE: 73 MMHG | BODY MASS INDEX: 23.02 KG/M2

## 2025-04-24 VITALS
OXYGEN SATURATION: 97 % | TEMPERATURE: 96.9 F | RESPIRATION RATE: 18 BRPM | SYSTOLIC BLOOD PRESSURE: 112 MMHG | DIASTOLIC BLOOD PRESSURE: 73 MMHG | HEART RATE: 83 BPM

## 2025-04-24 DIAGNOSIS — R63.0 ANOREXIA: ICD-10-CM

## 2025-04-24 DIAGNOSIS — R11.2 CHEMOTHERAPY INDUCED NAUSEA AND VOMITING: ICD-10-CM

## 2025-04-24 DIAGNOSIS — C22.1 CHOLANGIOCARCINOMA (H): Primary | ICD-10-CM

## 2025-04-24 DIAGNOSIS — T45.1X5A CHEMOTHERAPY INDUCED NAUSEA AND VOMITING: ICD-10-CM

## 2025-04-24 DIAGNOSIS — C77.2 ADENOCARCINOMA METASTATIC TO INTRA-ABDOMINAL LYMPH NODE (H): ICD-10-CM

## 2025-04-24 LAB
ALBUMIN SERPL BCG-MCNC: 3.2 G/DL (ref 3.5–5.2)
ALP SERPL-CCNC: 609 U/L (ref 40–150)
ALT SERPL W P-5'-P-CCNC: 26 U/L (ref 0–70)
ANION GAP SERPL CALCULATED.3IONS-SCNC: 9 MMOL/L (ref 7–15)
AST SERPL W P-5'-P-CCNC: 41 U/L (ref 0–45)
BASOPHILS # BLD MANUAL: 0.2 10E3/UL (ref 0–0.2)
BASOPHILS NFR BLD MANUAL: 2 %
BILIRUB SERPL-MCNC: 0.3 MG/DL
BUN SERPL-MCNC: 32.8 MG/DL (ref 8–23)
CALCIUM SERPL-MCNC: 11.6 MG/DL (ref 8.8–10.4)
CHLORIDE SERPL-SCNC: 99 MMOL/L (ref 98–107)
CREAT SERPL-MCNC: 1.77 MG/DL (ref 0.67–1.17)
EGFRCR SERPLBLD CKD-EPI 2021: 39 ML/MIN/1.73M2
EOSINOPHIL # BLD MANUAL: 0.4 10E3/UL (ref 0–0.7)
EOSINOPHIL NFR BLD MANUAL: 4 %
ERYTHROCYTE [DISTWIDTH] IN BLOOD BY AUTOMATED COUNT: 15 % (ref 10–15)
GLUCOSE SERPL-MCNC: 103 MG/DL (ref 70–99)
HCO3 SERPL-SCNC: 27 MMOL/L (ref 22–29)
HCT VFR BLD AUTO: 25.5 % (ref 40–53)
HGB BLD-MCNC: 8.2 G/DL (ref 13.3–17.7)
LDH SERPL L TO P-CCNC: 203 U/L (ref 0–250)
LYMPHOCYTES # BLD MANUAL: 0.5 10E3/UL (ref 0.8–5.3)
LYMPHOCYTES NFR BLD MANUAL: 5 %
MAGNESIUM SERPL-MCNC: 2.3 MG/DL (ref 1.7–2.3)
MCH RBC QN AUTO: 28.7 PG (ref 26.5–33)
MCHC RBC AUTO-ENTMCNC: 32.2 G/DL (ref 31.5–36.5)
MCV RBC AUTO: 89 FL (ref 78–100)
METAMYELOCYTES # BLD MANUAL: 0.1 10E3/UL
METAMYELOCYTES NFR BLD MANUAL: 1 %
MONOCYTES # BLD MANUAL: 1.5 10E3/UL (ref 0–1.3)
MONOCYTES NFR BLD MANUAL: 16 %
NEUTROPHILS # BLD MANUAL: 6.8 10E3/UL (ref 1.6–8.3)
NEUTROPHILS NFR BLD MANUAL: 72 %
PLAT MORPH BLD: ABNORMAL
PLATELET # BLD AUTO: 636 10E3/UL (ref 150–450)
POLYCHROMASIA BLD QL SMEAR: SLIGHT
POTASSIUM SERPL-SCNC: 4.5 MMOL/L (ref 3.4–5.3)
PROT SERPL-MCNC: 6.7 G/DL (ref 6.4–8.3)
RBC # BLD AUTO: 2.86 10E6/UL (ref 4.4–5.9)
RBC MORPH BLD: ABNORMAL
SODIUM SERPL-SCNC: 135 MMOL/L (ref 135–145)
TOXIC GRANULES BLD QL SMEAR: PRESENT
TSH SERPL DL<=0.005 MIU/L-ACNC: 3.18 UIU/ML (ref 0.3–4.2)
WBC # BLD AUTO: 9.4 10E3/UL (ref 4–11)

## 2025-04-24 PROCEDURE — 258N000003 HC RX IP 258 OP 636: Performed by: INTERNAL MEDICINE

## 2025-04-24 PROCEDURE — 80053 COMPREHEN METABOLIC PANEL: CPT

## 2025-04-24 PROCEDURE — 250N000011 HC RX IP 250 OP 636: Performed by: INTERNAL MEDICINE

## 2025-04-24 PROCEDURE — 83735 ASSAY OF MAGNESIUM: CPT | Performed by: INTERNAL MEDICINE

## 2025-04-24 PROCEDURE — 83615 LACTATE (LD) (LDH) ENZYME: CPT

## 2025-04-24 PROCEDURE — 85007 BL SMEAR W/DIFF WBC COUNT: CPT

## 2025-04-24 PROCEDURE — 84443 ASSAY THYROID STIM HORMONE: CPT | Performed by: INTERNAL MEDICINE

## 2025-04-24 PROCEDURE — G0463 HOSPITAL OUTPT CLINIC VISIT: HCPCS | Performed by: INTERNAL MEDICINE

## 2025-04-24 PROCEDURE — 36591 DRAW BLOOD OFF VENOUS DEVICE: CPT

## 2025-04-24 PROCEDURE — 85027 COMPLETE CBC AUTOMATED: CPT

## 2025-04-24 RX ORDER — MAGNESIUM CARB/ALUMINUM HYDROX 105-160MG
296 TABLET,CHEWABLE ORAL PRN
COMMUNITY

## 2025-04-24 RX ORDER — HEPARIN SODIUM (PORCINE) LOCK FLUSH IV SOLN 100 UNIT/ML 100 UNIT/ML
5 SOLUTION INTRAVENOUS
Status: DISCONTINUED | OUTPATIENT
Start: 2025-04-24 | End: 2025-04-24 | Stop reason: HOSPADM

## 2025-04-24 RX ORDER — HEPARIN SODIUM (PORCINE) LOCK FLUSH IV SOLN 100 UNIT/ML 100 UNIT/ML
5 SOLUTION INTRAVENOUS
OUTPATIENT
Start: 2025-04-24

## 2025-04-24 RX ADMIN — SODIUM CHLORIDE 1000 ML: 0.9 INJECTION, SOLUTION INTRAVENOUS at 09:53

## 2025-04-24 RX ADMIN — HEPARIN 5 ML: 100 SYRINGE at 10:57

## 2025-04-24 ASSESSMENT — PAIN SCALES - GENERAL: PAINLEVEL_OUTOF10: MODERATE PAIN (6)

## 2025-04-24 NOTE — PROGRESS NOTES
Infusion Nursing Note:  David Samaniego presents today for IV fluids.    Patient seen by provider today: Yes: Everette   present during visit today: Not Applicable.    Note: /73   Pulse 83   Temp 96.9  F (36.1  C)   Resp 18   SpO2 97%       Premeds Given: none    Intravenous Access:  Implanted Port.    Treatment Conditions:  Not Applicable.      Post Infusion Assessment:  Patient tolerated infusion without incident.  Blood return noted pre and post infusion.  Site patent and intact, free from redness, edema or discomfort.  No evidence of extravasations.  Access discontinued per protocol.       Discharge Plan:   Patient discharged in stable condition accompanied by: self, wife, daughter, and son-in-law.  Departure Mode: Wheelchair.      Susan Lizarraga RN

## 2025-04-24 NOTE — LETTER
"4/24/2025      David Samaniego  [id number] [facility] [facility] [address]      Dear Colleague,    Thank you for referring your patient, David Samaniego, to the [facility] [place]. Please see a copy of my visit note below.    Oncology Rooming Note    April 24, 2025 9:22 AM   David Samaniego is a 74 year old male who presents for:    Chief Complaint   Patient presents with     Oncology Clinic Visit       Cholangiocarcinoma        Initial Vitals: /73 (BP Location: Left arm, Patient Position: Sitting, Cuff Size: Adult Regular)   Pulse 83   Temp 96.9  F (36.1  C) (Tympanic)   Resp 18   Ht 1.943 m (6' 4.5\")   Wt 88.5 kg (195 lb)   SpO2 97%   BMI 23.43 kg/m   Estimated body mass index is 23.43 kg/m  as calculated from the following:    Height as of this encounter: 1.943 m (6' 4.5\").    Weight as of this encounter: 88.5 kg (195 lb). Body surface area is 2.19 meters squared.  Moderate Pain (6) Comment: Data Unavailable   No LMP for male patient.  Allergies reviewed: Yes  Medications reviewed: Yes    Medications: Medication refills not needed today.  Pharmacy name entered into [employer]:    [facility] - [address] [id number] - [place], [place] - [address] [facility] AT [facility] [address]     Frailty Screening:   Is the patient here for a new oncology consult visit in cancer care? 2. 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  Dose of cisplatin decreased by 25% due to renal function    Patient transition to home hospice on 4/24/2025 because of deteriorating performance status        History of Present Illness  Patient is here in follow-up.  He has not been feeling well.  He was very constipated but he finally had a bowel movement yesterday after using mag citrate.  He has continued to feel really weak he has no appetite and food does not taste good and if he tries to eat he feels like throwing up his oral intake has been minimal.  He does not complain of any significant nausea or pain he has not taken any oxycodone for a couple of days.    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     No Comments Provided     Enlarged prostate without lower urinary tract symptoms (luts)     No Comments Provided     Essential (primary) hypertension     No Comments Provided     Gastro-esophageal reflux disease without esophagitis     No Comments Provided     Hypothyroidism     No Comments Provided     Other specified congenital malformations of intestine     No Comments Provided     PONV (postoperative nausea and vomiting)     with surgeries in the 80's       Past Surgical History:   Procedure Laterality Date     BACK SURGERY      1986 and 1987     COLONOSCOPY      7/29/05,next due 2015     COLONOSCOPY  10/26/2015    Tubular adenoma,F/U  "2020     COLONOSCOPY N/A 11/16/2020    F/U 2025 tubular adenomas     CYSTOSCOPY, TRANSURETHRAL RESECTION (TUR) PROSTATE, COMBINED N/A 07/30/2019    Procedure: CYSTOSCOPY, WITH TRANSURETHRAL RESECTION (TUR) PROSTATE;  Surgeon: Art Liu MD;  Location: [facility] OR     IR CHEST PORT PLACEMENT > 5 YRS OF AGE  3/10/2025     OTHER SURGICAL HISTORY      [id number],PARTIAL THYROIDECTOMY     OTHER SURGICAL HISTORY      [id number],ANESTHESIA ALERT,Notes no prior complications from anesthesia.:     [facility] TUR PROSTATE BIOPSIES         Physical Exam    /73 (BP Location: Left arm, Patient Position: Sitting, Cuff Size: Adult Regular)   Pulse 83   Temp 96.9  F (36.1  C) (Tympanic)   Resp 18   Ht 1.943 m (6' 4.5\")   Wt 88.5 kg (195 lb)   SpO2 97%   BMI 23.43 kg/m      General: alert, awake, not in acute distress  HEENT: Head: Normal, normocephalic, atraumatic.  Eye: Normal external eye, conjunctiva, lids cornea, DARY.  Nose: Normal external nose, mucus membranes and septum.  Pharynx: Normal buccal mucosa. Normal pharynx.  Neck / Thyroid: Supple, no masses, nodes, nodules or enlargement.  Lymphatics: No abnormally enlarged lymph nodes.  Chest: Normal chest wall and respirations. Clear to auscultation.  No crackles or rhonchi's  Heart: S1 S2 RRR, no murmur.   Abdomen: abdomen is soft without significant tenderness, masses, organomegaly or guarding  Extremities: normal strength, tone, and muscle mass  Skin: normal. no rash or abnormalities  CNS: non focal.    Lab Results    Recent Results (from the past 240 hours)   Comprehensive metabolic panel    Collection Time: 04/17/25  8:50 AM   Result Value Ref Range    Sodium 137 135 - 145 mmol/L    Potassium 4.5 3.4 - 5.3 mmol/L    Carbon Dioxide (CO2) 24 22 - 29 mmol/L    Anion Gap 11 7 - 15 mmol/L    Urea Nitrogen 34.7 (H) 8.0 - 23.0 mg/dL    Creatinine 1.63 (H) 0.67 - 1.17 mg/dL    GFR Estimate 43 (L) >60 mL/min/1.73m2    Calcium 11.4 (H) 8.8 - 10.4 mg/dL    Chloride 102 98 - 107 mmol/L "    Glucose 96 70 - 99 mg/dL    Alkaline Phosphatase 615 (H) 40 - 150 U/L    AST 42 0 - 45 U/L    ALT 43 0 - 70 U/L    Protein Total 6.4 6.4 - 8.3 g/dL    Albumin 3.2 (L) 3.5 - 5.2 g/dL    Bilirubin Total 0.4 <=1.2 mg/dL   Magnesium    Collection Time: 04/17/25  8:50 AM   Result Value Ref Range    Magnesium 2.0 1.7 - 2.3 mg/dL   TSH with free T4 reflex    Collection Time: 04/17/25  8:50 AM   Result Value Ref Range    TSH 5.70 (H) 0.30 - 4.20 uIU/mL   CBC with platelets and differential    Collection Time: 04/17/25  8:50 AM   Result Value Ref Range    WBC Count 3.1 (L) 4.0 - 11.0 10e3/uL    RBC Count 2.82 (L) 4.40 - 5.90 10e6/uL    Hemoglobin 8.2 (L) 13.3 - 17.7 g/dL    Hematocrit 24.8 (L) 40.0 - 53.0 %    MCV 88 78 - 100 fL    MCH 29.1 26.5 - 33.0 pg    MCHC 33.1 31.5 - 36.5 g/dL    RDW 13.5 10.0 - 15.0 %    Platelet Count 455 (H) 150 - 450 10e3/uL    % Neutrophils 51 %    % Lymphocytes 15 %    % Monocytes 28 %    % Eosinophils 2 %    % Basophils 0 %    % Immature Granulocytes 4 %    NRBCs per 100 WBC 0 <1 /100    Absolute Neutrophils 1.6 1.6 - 8.3 10e3/uL    Absolute Lymphocytes 0.5 (L) 0.8 - 5.3 10e3/uL    Absolute Monocytes 0.9 0.0 - 1.3 10e3/uL    Absolute Eosinophils 0.1 0.0 - 0.7 10e3/uL    Absolute Basophils 0.0 0.0 - 0.2 10e3/uL    Absolute Immature Granulocytes 0.1 <=0.4 10e3/uL    Absolute NRBCs 0.0 10e3/uL   Cancer antigen 19-9    Collection Time: 04/17/25  8:50 AM   Result Value Ref Range    Cancer Antigen 19-9 74 (H) <=35 U/mL   T4 free    Collection Time: 04/17/25  8:50 AM   Result Value Ref Range    Free T4 1.54 0.90 - 1.70 ng/dL   Magnesium    Collection Time: 04/24/25  8:59 AM   Result Value Ref Range    Magnesium 2.3 1.7 - 2.3 mg/dL   TSH with free T4 reflex    Collection Time: 04/24/25  8:59 AM   Result Value Ref Range    TSH 3.18 0.30 - 4.20 uIU/mL   Comprehensive metabolic panel    Collection Time: 04/24/25  8:59 AM   Result Value Ref Range    Sodium 135 135 - 145 mmol/L    Potassium 4.5 3.4 -  5.3 mmol/L    Carbon Dioxide (CO2) 27 22 - 29 mmol/L    Anion Gap 9 7 - 15 mmol/L    Urea Nitrogen 32.8 (H) 8.0 - 23.0 mg/dL    Creatinine 1.77 (H) 0.67 - 1.17 mg/dL    GFR Estimate 39 (L) >60 mL/min/1.73m2    Calcium 11.6 (H) 8.8 - 10.4 mg/dL    Chloride 99 98 - 107 mmol/L    Glucose 103 (H) 70 - 99 mg/dL    Alkaline Phosphatase 609 (H) 40 - 150 U/L    AST 41 0 - 45 U/L    ALT 26 0 - 70 U/L    Protein Total 6.7 6.4 - 8.3 g/dL    Albumin 3.2 (L) 3.5 - 5.2 g/dL    Bilirubin Total 0.3 <=1.2 mg/dL   Lactate Dehydrogenase    Collection Time: 04/24/25  8:59 AM   Result Value Ref Range    Lactate Dehydrogenase 203 0 - 250 U/L   CBC with platelets and differential    Collection Time: 04/24/25  8:59 AM   Result Value Ref Range    WBC Count 9.4 4.0 - 11.0 10e3/uL    RBC Count 2.86 (L) 4.40 - 5.90 10e6/uL    Hemoglobin 8.2 (L) 13.3 - 17.7 g/dL    Hematocrit 25.5 (L) 40.0 - 53.0 %    MCV 89 78 - 100 fL    MCH 28.7 26.5 - 33.0 pg    MCHC 32.2 31.5 - 36.5 g/dL    RDW 15.0 10.0 - 15.0 %    Platelet Count 636 (H) 150 - 450 10e3/uL   Manual Differential    Collection Time: 04/24/25  8:59 AM   Result Value Ref Range    % Neutrophils 72 %    % Lymphocytes 5 %    % Monocytes 16 %    % Eosinophils 4 %    % Basophils 2 %    % Metamyelocytes 1 %    Absolute Neutrophils 6.8 1.6 - 8.3 10e3/uL    Absolute Lymphocytes 0.5 (L) 0.8 - 5.3 10e3/uL    Absolute Monocytes 1.5 (H) 0.0 - 1.3 10e3/uL    Absolute Eosinophils 0.4 0.0 - 0.7 10e3/uL    Absolute Basophils 0.2 0.0 - 0.2 10e3/uL    Absolute Metamyelocytes 0.1 (H) <=0.0 10e3/uL   RBC and Platelet Morphology    Collection Time: 04/24/25  8:59 AM   Result Value Ref Range    RBC Morphology Confirmed RBC Indices     Platelet Assessment (A) Automated Count Confirmed. Platelet morphology is normal.     Automated Count Confirmed. Giant platelets are present.    Polychromasia Slight (A) None Seen    Toxic Neutrophils Present (A) None Seen         Imaging    MRI Lumbar spine w & w/o contrast    Result  Date: 4/4/2025  EXAM: MR LUMBAR SPINE W/O and W CONTRAST LOCATION: [facility] DATE: 4/4/2025 INDICATION: Cord compression syndrome (H). Cholangiocarcinoma (H). COMPARISON: CT abdomen 3/31/2025. PET CT 3/12/2025. Lumbar spine MRI 3/7/2012. CONTRAST: 9.59 mL Gadavist. TECHNIQUE: Routine Lumbar Spine MRI without and with IV contrast. FINDINGS: NOMENCLATURE: Five lumbar-type vertebral bodies. ALIGNMENT: Minimal L4-L5, L3-L4 and L2-L3 retrolisthesis. BONES: Vertebral body heights are unremarkable. There are mild presumably degenerative endplate contour irregularities. Marrow signal is heterogeneous but without specific evidence of a marrow-replacing process. The known metastatic lesion of the right iliac crest is excluded from the field-of-view. No evidence for pathologic marrow edema or enhancement. DISCS: Mild to moderate multilevel spondylosis described in further detail below. SPINAL CORD: The conus terminates at T12-L1. No signal abnormalities of the imaged cord or cauda equina nerve roots are demonstrated. SPINAL CANAL: Moderate L3-L4 spinal canal stenosis. PARASPINAL SOFT TISSUES: Unremarkable. ABDOMINAL CAVITY: No acute abnormality demonstrated within technique limitations. SIGNIFICANT FINDINGS BY LEVEL: T12-L1: Normal disc height and signal. No herniation. Normal facets. No spinal canal or neural foraminal stenosis. L1-L2: Mild intervertebral disc height loss. Disc desiccation. Trace bulge. No significant facet arthropathy. No significant stenosis of the spinal canal or neural foramina. L2-L3: Mild intervertebral disc height loss. Disc desiccation. Shallow bulge. Mild facet arthropathy. Mild spinal canal stenosis. Moderate right and mild left neural foraminal stenosis. L3-L4: Mild intervertebral disc height loss. Slight desiccation. Shallow bulge and left paracentral protrusion. Moderate facet arthropathy. Ligamentum flavum thickening. Moderate spinal canal stenosis. Moderate bilateral  lateral recess stenosis. Mild bilateral neural foraminal stenosis. L4-L5: Advanced intervertebral disc height loss. Shallow bulge. Interbody spurring. Moderate facet arthropathy. Mild spinal canal stenosis. Prior left hemilaminotomy. Mild to moderate left lateral recess stenosis encroaching upon the descending left L5 nerve root. Moderate right and moderate-to-severe left neural foraminal stenosis. L5-S1: Normal disc height and signal. No herniation. Mild facet arthropathy. Prior right hemilaminectomy. No spinal canal or neural foraminal stenosis.     IMPRESSION: 1.  No evidence for metastatic disease of the lumbar spine. A known metastatic lesion of the right iliac crest is excluded from the field-of-view. 2.  No evidence for cord compression. 3.  At L2-L3, there is moderate right neural foraminal stenosis. 4.  At L3-L4, there is moderate spinal canal stenosis and moderate bilateral lateral recess stenosis encroaching upon the descending L4 nerve roots. 5.  At L4-L5, there is mild-to-moderate left lateral recess stenosis encroaching upon the descending left L5 nerve root. There is moderate right and moderate-to-severe left neural foraminal stenosis. 6.  Additional degenerative changes as above.    CT Abdomen Pelvis w/o Contrast    Result Date: 3/31/2025  EXAM: CT ABDOMEN PELVIS W/O CONTRAST LOCATION: [facility] DATE: 3/31/2025 INDICATION: Abdominal pain. History of cholangiocarcinoma. No bowel movement for 5 days. Small bowel obstruction. COMPARISON: 1. Whole-body PET/CT 3/12/2025. 2. CT abdomen and pelvis with IV contrast 2/14/2025. TECHNIQUE: CT scan of the abdomen and pelvis was performed without IV contrast. Multiplanar reformats were obtained. Dose reduction techniques were used. CONTRAST: None. FINDINGS: LOWER CHEST: A few strands of platelike atelectasis have developed in the lower lungs. No pleural effusion on either side. Normal cardiac size. Hypodensity of the cardiac ventricles  suggests anemia. Coronary artery calcifications. Trace pericardial effusion. HEPATOBILIARY: Large confluent central hepatic hypodense mass, interval confluence of multiple rounded lesions seen on prior studies measuring a conglomerate 14.5 cm AP x 10 cm transversely x 12.7 cm CC dimension (image 70, series 3, image 51, series 5).  Intrahepatic segmental biliary ectasia due to central masses. The liver is enlarged, length of the right lobe measures 28 cm (image 45, series 5), previously 26 cm. Dependent calcified gallstones without adjacent inflammatory changes. Small amount of ascites adjacent to the liver, unchanged. PANCREAS: Normal. SPLEEN: Enlarged without discrete lesion, AP length measures 17.6 cm (image 28, series 2), previously 14.3 cm. ADRENAL GLANDS: Normal. KIDNEYS/BLADDER: Right renal cortical cysts, no specific follow-up required. No urinary tract calculi. Both kidneys are negative for hydronephrosis or hydroureter. Partially distended urinary bladder. Prostatomegaly bulging into the bladder base. BOWEL: Moderate amount of formed stool material within normal caliber redundant colon, without mechanical obstruction or free gas. Small amount of ascites. LYMPH NODES: Shotty subcentimeter retroperitoneal nodes. VASCULATURE: Atherosclerotic and ectatic distal abdominal aorta measuring 2.4 x 2.4 cm (image 56, series 2). Normal caliber IVC. PELVIC ORGANS: Prostatomegaly. Small amount of ascites. Atherosclerotic changes. MUSCULOSKELETAL: Mild diffuse body wall edema. Degenerative changes involving the spine and joints of the pelvis.     IMPRESSION: 1.  Large confluent central hepatic hypodense mass, interval confluence of multiple rounded lesions seen previously. The liver is enlarged, length of the right lobe measures 28 cm. Foci of intrahepatic biliary ectasia attributable to confluent central mass. Small amount of ascites adjacent to the liver, stable. Cholelithiasis. Moderate splenomegaly, more apparent on  current study. 2.  Moderate amount of formed stool material within normal caliber colon. No mechanical obstruction or free gas. 3.  Right renal cortical cysts, these require no specific follow-up. No urinary tract calculi, hydronephrosis or hydroureter. Prostatomegaly bulging into the bladder base. 4.  Normal cardiac size. Coronary artery calcifications. Hypodensity of the cardiac ventricles suggests anemia. Atherosclerotic and mildly ectatic abdominal aorta. No aneurysm. 5.  Mild diffuse body wall edema. Degenerative changes involving the spine and joints of the pelvis.          Assessment and Plan    Metastatic intrahepatic cholangiocarcinoma    Patient is here in follow-up.  He is 4 weeks out from his first cycle of chemotherapy with cisplatin gemcitabine and durvalumab.  He has had no significant improvement in his clinical status.  There are things that are worse.  He does not feel like eating and he is eating much less than what he was eating even before we started him on chemo and his oral intake is minimal.  He was very constipated but he finally had a bowel movement yesterday after using mag citrate I had an extensive discussion with the patient and his multiple family members about moving forward.  Patient and the family do want to transition to comfort care which I think is reasonable considering how much she has suffered in the last 3 to 4 weeks and has had no significant clinical improvement.  We will transition the patient to home hospice with plans for no further chemotherapy.  Patient and the family understand that he would continue to get weaker and would die soon but they want to focus on keeping him comfortable.    Hospice will be called today so he can be admitted to home hospice.  Will give him a liter of fluid today while he is at the cancer center.  Signed by: Aylin Gaviria MD        CC: Derrick Mckeon MD       Again, thank you for allowing me to participate in the care of your patient.         Sincerely,        Aylin Gaviria MD    Electronically signed

## 2025-04-24 NOTE — PROGRESS NOTES
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  Dose of cisplatin decreased by 25% due to renal function    Patient transition to home hospice on 4/24/2025 because of deteriorating performance status        History of Present Illness  Patient is here in follow-up.  He has not been feeling well.  He was very constipated but he finally had a bowel movement yesterday after using mag citrate.  He has continued to feel really weak he has no appetite and food does not taste good and if he tries to eat he feels like throwing up his oral intake has been minimal.  He does not complain of any significant nausea or pain he has not taken any oxycodone for a couple of days.    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     No Comments Provided    Enlarged prostate without lower urinary tract symptoms (luts)     No Comments Provided    Essential (primary) hypertension     No Comments Provided    Gastro-esophageal reflux disease  "without esophagitis     No Comments Provided    Hypothyroidism     No Comments Provided    Other specified congenital malformations of intestine     No Comments Provided    PONV (postoperative nausea and vomiting)     with surgeries in the 80's       Past Surgical History:   Procedure Laterality Date    BACK SURGERY      1986 and 1987    COLONOSCOPY      7/29/05,next due 2015    COLONOSCOPY  10/26/2015    Tubular adenoma,F/U 2020    COLONOSCOPY N/A 11/16/2020    F/U 2025 tubular adenomas    CYSTOSCOPY, TRANSURETHRAL RESECTION (TUR) PROSTATE, COMBINED N/A 07/30/2019    Procedure: CYSTOSCOPY, WITH TRANSURETHRAL RESECTION (TUR) PROSTATE;  Surgeon: Art Liu MD;  Location: [facility] OR    IR CHEST PORT PLACEMENT > 5 YRS OF AGE  3/10/2025    OTHER SURGICAL HISTORY      [id number],PARTIAL THYROIDECTOMY    OTHER SURGICAL HISTORY      [id number],ANESTHESIA ALERT,Notes no prior complications from anesthesia.:    [facility] TUR PROSTATE BIOPSIES         Physical Exam    /73 (BP Location: Left arm, Patient Position: Sitting, Cuff Size: Adult Regular)   Pulse 83   Temp 96.9  F (36.1  C) (Tympanic)   Resp 18   Ht 1.943 m (6' 4.5\")   Wt 88.5 kg (195 lb)   SpO2 97%   BMI 23.43 kg/m      General: alert, awake, not in acute distress  HEENT: Head: Normal, normocephalic, atraumatic.  Eye: Normal external eye, conjunctiva, lids cornea, DARY.  Nose: Normal external nose, mucus membranes and septum.  Pharynx: Normal buccal mucosa. Normal pharynx.  Neck / Thyroid: Supple, no masses, nodes, nodules or enlargement.  Lymphatics: No abnormally enlarged lymph nodes.  Chest: Normal chest wall and respirations. Clear to auscultation.  No crackles or rhonchi's  Heart: S1 S2 RRR, no murmur.   Abdomen: abdomen is soft without significant tenderness, masses, organomegaly or guarding  Extremities: normal strength, tone, and muscle mass  Skin: normal. no rash or abnormalities  CNS: non focal.    Lab Results    Recent Results (from the past 240 hours) "   Comprehensive metabolic panel    Collection Time: 04/17/25  8:50 AM   Result Value Ref Range    Sodium 137 135 - 145 mmol/L    Potassium 4.5 3.4 - 5.3 mmol/L    Carbon Dioxide (CO2) 24 22 - 29 mmol/L    Anion Gap 11 7 - 15 mmol/L    Urea Nitrogen 34.7 (H) 8.0 - 23.0 mg/dL    Creatinine 1.63 (H) 0.67 - 1.17 mg/dL    GFR Estimate 43 (L) >60 mL/min/1.73m2    Calcium 11.4 (H) 8.8 - 10.4 mg/dL    Chloride 102 98 - 107 mmol/L    Glucose 96 70 - 99 mg/dL    Alkaline Phosphatase 615 (H) 40 - 150 U/L    AST 42 0 - 45 U/L    ALT 43 0 - 70 U/L    Protein Total 6.4 6.4 - 8.3 g/dL    Albumin 3.2 (L) 3.5 - 5.2 g/dL    Bilirubin Total 0.4 <=1.2 mg/dL   Magnesium    Collection Time: 04/17/25  8:50 AM   Result Value Ref Range    Magnesium 2.0 1.7 - 2.3 mg/dL   TSH with free T4 reflex    Collection Time: 04/17/25  8:50 AM   Result Value Ref Range    TSH 5.70 (H) 0.30 - 4.20 uIU/mL   CBC with platelets and differential    Collection Time: 04/17/25  8:50 AM   Result Value Ref Range    WBC Count 3.1 (L) 4.0 - 11.0 10e3/uL    RBC Count 2.82 (L) 4.40 - 5.90 10e6/uL    Hemoglobin 8.2 (L) 13.3 - 17.7 g/dL    Hematocrit 24.8 (L) 40.0 - 53.0 %    MCV 88 78 - 100 fL    MCH 29.1 26.5 - 33.0 pg    MCHC 33.1 31.5 - 36.5 g/dL    RDW 13.5 10.0 - 15.0 %    Platelet Count 455 (H) 150 - 450 10e3/uL    % Neutrophils 51 %    % Lymphocytes 15 %    % Monocytes 28 %    % Eosinophils 2 %    % Basophils 0 %    % Immature Granulocytes 4 %    NRBCs per 100 WBC 0 <1 /100    Absolute Neutrophils 1.6 1.6 - 8.3 10e3/uL    Absolute Lymphocytes 0.5 (L) 0.8 - 5.3 10e3/uL    Absolute Monocytes 0.9 0.0 - 1.3 10e3/uL    Absolute Eosinophils 0.1 0.0 - 0.7 10e3/uL    Absolute Basophils 0.0 0.0 - 0.2 10e3/uL    Absolute Immature Granulocytes 0.1 <=0.4 10e3/uL    Absolute NRBCs 0.0 10e3/uL   Cancer antigen 19-9    Collection Time: 04/17/25  8:50 AM   Result Value Ref Range    Cancer Antigen 19-9 74 (H) <=35 U/mL   T4 free    Collection Time: 04/17/25  8:50 AM   Result  Value Ref Range    Free T4 1.54 0.90 - 1.70 ng/dL   Magnesium    Collection Time: 04/24/25  8:59 AM   Result Value Ref Range    Magnesium 2.3 1.7 - 2.3 mg/dL   TSH with free T4 reflex    Collection Time: 04/24/25  8:59 AM   Result Value Ref Range    TSH 3.18 0.30 - 4.20 uIU/mL   Comprehensive metabolic panel    Collection Time: 04/24/25  8:59 AM   Result Value Ref Range    Sodium 135 135 - 145 mmol/L    Potassium 4.5 3.4 - 5.3 mmol/L    Carbon Dioxide (CO2) 27 22 - 29 mmol/L    Anion Gap 9 7 - 15 mmol/L    Urea Nitrogen 32.8 (H) 8.0 - 23.0 mg/dL    Creatinine 1.77 (H) 0.67 - 1.17 mg/dL    GFR Estimate 39 (L) >60 mL/min/1.73m2    Calcium 11.6 (H) 8.8 - 10.4 mg/dL    Chloride 99 98 - 107 mmol/L    Glucose 103 (H) 70 - 99 mg/dL    Alkaline Phosphatase 609 (H) 40 - 150 U/L    AST 41 0 - 45 U/L    ALT 26 0 - 70 U/L    Protein Total 6.7 6.4 - 8.3 g/dL    Albumin 3.2 (L) 3.5 - 5.2 g/dL    Bilirubin Total 0.3 <=1.2 mg/dL   Lactate Dehydrogenase    Collection Time: 04/24/25  8:59 AM   Result Value Ref Range    Lactate Dehydrogenase 203 0 - 250 U/L   CBC with platelets and differential    Collection Time: 04/24/25  8:59 AM   Result Value Ref Range    WBC Count 9.4 4.0 - 11.0 10e3/uL    RBC Count 2.86 (L) 4.40 - 5.90 10e6/uL    Hemoglobin 8.2 (L) 13.3 - 17.7 g/dL    Hematocrit 25.5 (L) 40.0 - 53.0 %    MCV 89 78 - 100 fL    MCH 28.7 26.5 - 33.0 pg    MCHC 32.2 31.5 - 36.5 g/dL    RDW 15.0 10.0 - 15.0 %    Platelet Count 636 (H) 150 - 450 10e3/uL   Manual Differential    Collection Time: 04/24/25  8:59 AM   Result Value Ref Range    % Neutrophils 72 %    % Lymphocytes 5 %    % Monocytes 16 %    % Eosinophils 4 %    % Basophils 2 %    % Metamyelocytes 1 %    Absolute Neutrophils 6.8 1.6 - 8.3 10e3/uL    Absolute Lymphocytes 0.5 (L) 0.8 - 5.3 10e3/uL    Absolute Monocytes 1.5 (H) 0.0 - 1.3 10e3/uL    Absolute Eosinophils 0.4 0.0 - 0.7 10e3/uL    Absolute Basophils 0.2 0.0 - 0.2 10e3/uL    Absolute Metamyelocytes 0.1 (H) <=0.0  10e3/uL   RBC and Platelet Morphology    Collection Time: 04/24/25  8:59 AM   Result Value Ref Range    RBC Morphology Confirmed RBC Indices     Platelet Assessment (A) Automated Count Confirmed. Platelet morphology is normal.     Automated Count Confirmed. Giant platelets are present.    Polychromasia Slight (A) None Seen    Toxic Neutrophils Present (A) None Seen         Imaging    MRI Lumbar spine w & w/o contrast    Result Date: 4/4/2025  EXAM: MR LUMBAR SPINE W/O and W CONTRAST LOCATION: [facility] DATE: 4/4/2025 INDICATION: Cord compression syndrome (H). Cholangiocarcinoma (H). COMPARISON: CT abdomen 3/31/2025. PET CT 3/12/2025. Lumbar spine MRI 3/7/2012. CONTRAST: 9.59 mL Gadavist. TECHNIQUE: Routine Lumbar Spine MRI without and with IV contrast. FINDINGS: NOMENCLATURE: Five lumbar-type vertebral bodies. ALIGNMENT: Minimal L4-L5, L3-L4 and L2-L3 retrolisthesis. BONES: Vertebral body heights are unremarkable. There are mild presumably degenerative endplate contour irregularities. Marrow signal is heterogeneous but without specific evidence of a marrow-replacing process. The known metastatic lesion of the right iliac crest is excluded from the field-of-view. No evidence for pathologic marrow edema or enhancement. DISCS: Mild to moderate multilevel spondylosis described in further detail below. SPINAL CORD: The conus terminates at T12-L1. No signal abnormalities of the imaged cord or cauda equina nerve roots are demonstrated. SPINAL CANAL: Moderate L3-L4 spinal canal stenosis. PARASPINAL SOFT TISSUES: Unremarkable. ABDOMINAL CAVITY: No acute abnormality demonstrated within technique limitations. SIGNIFICANT FINDINGS BY LEVEL: T12-L1: Normal disc height and signal. No herniation. Normal facets. No spinal canal or neural foraminal stenosis. L1-L2: Mild intervertebral disc height loss. Disc desiccation. Trace bulge. No significant facet arthropathy. No significant stenosis of the spinal  canal or neural foramina. L2-L3: Mild intervertebral disc height loss. Disc desiccation. Shallow bulge. Mild facet arthropathy. Mild spinal canal stenosis. Moderate right and mild left neural foraminal stenosis. L3-L4: Mild intervertebral disc height loss. Slight desiccation. Shallow bulge and left paracentral protrusion. Moderate facet arthropathy. Ligamentum flavum thickening. Moderate spinal canal stenosis. Moderate bilateral lateral recess stenosis. Mild bilateral neural foraminal stenosis. L4-L5: Advanced intervertebral disc height loss. Shallow bulge. Interbody spurring. Moderate facet arthropathy. Mild spinal canal stenosis. Prior left hemilaminotomy. Mild to moderate left lateral recess stenosis encroaching upon the descending left L5 nerve root. Moderate right and moderate-to-severe left neural foraminal stenosis. L5-S1: Normal disc height and signal. No herniation. Mild facet arthropathy. Prior right hemilaminectomy. No spinal canal or neural foraminal stenosis.     IMPRESSION: 1.  No evidence for metastatic disease of the lumbar spine. A known metastatic lesion of the right iliac crest is excluded from the field-of-view. 2.  No evidence for cord compression. 3.  At L2-L3, there is moderate right neural foraminal stenosis. 4.  At L3-L4, there is moderate spinal canal stenosis and moderate bilateral lateral recess stenosis encroaching upon the descending L4 nerve roots. 5.  At L4-L5, there is mild-to-moderate left lateral recess stenosis encroaching upon the descending left L5 nerve root. There is moderate right and moderate-to-severe left neural foraminal stenosis. 6.  Additional degenerative changes as above.    CT Abdomen Pelvis w/o Contrast    Result Date: 3/31/2025  EXAM: CT ABDOMEN PELVIS W/O CONTRAST LOCATION: [facility] DATE: 3/31/2025 INDICATION: Abdominal pain. History of cholangiocarcinoma. No bowel movement for 5 days. Small bowel obstruction. COMPARISON: 1. Whole-body  PET/CT 3/12/2025. 2. CT abdomen and pelvis with IV contrast 2/14/2025. TECHNIQUE: CT scan of the abdomen and pelvis was performed without IV contrast. Multiplanar reformats were obtained. Dose reduction techniques were used. CONTRAST: None. FINDINGS: LOWER CHEST: A few strands of platelike atelectasis have developed in the lower lungs. No pleural effusion on either side. Normal cardiac size. Hypodensity of the cardiac ventricles suggests anemia. Coronary artery calcifications. Trace pericardial effusion. HEPATOBILIARY: Large confluent central hepatic hypodense mass, interval confluence of multiple rounded lesions seen on prior studies measuring a conglomerate 14.5 cm AP x 10 cm transversely x 12.7 cm CC dimension (image 70, series 3, image 51, series 5).  Intrahepatic segmental biliary ectasia due to central masses. The liver is enlarged, length of the right lobe measures 28 cm (image 45, series 5), previously 26 cm. Dependent calcified gallstones without adjacent inflammatory changes. Small amount of ascites adjacent to the liver, unchanged. PANCREAS: Normal. SPLEEN: Enlarged without discrete lesion, AP length measures 17.6 cm (image 28, series 2), previously 14.3 cm. ADRENAL GLANDS: Normal. KIDNEYS/BLADDER: Right renal cortical cysts, no specific follow-up required. No urinary tract calculi. Both kidneys are negative for hydronephrosis or hydroureter. Partially distended urinary bladder. Prostatomegaly bulging into the bladder base. BOWEL: Moderate amount of formed stool material within normal caliber redundant colon, without mechanical obstruction or free gas. Small amount of ascites. LYMPH NODES: Shotty subcentimeter retroperitoneal nodes. VASCULATURE: Atherosclerotic and ectatic distal abdominal aorta measuring 2.4 x 2.4 cm (image 56, series 2). Normal caliber IVC. PELVIC ORGANS: Prostatomegaly. Small amount of ascites. Atherosclerotic changes. MUSCULOSKELETAL: Mild diffuse body wall edema. Degenerative changes  involving the spine and joints of the pelvis.     IMPRESSION: 1.  Large confluent central hepatic hypodense mass, interval confluence of multiple rounded lesions seen previously. The liver is enlarged, length of the right lobe measures 28 cm. Foci of intrahepatic biliary ectasia attributable to confluent central mass. Small amount of ascites adjacent to the liver, stable. Cholelithiasis. Moderate splenomegaly, more apparent on current study. 2.  Moderate amount of formed stool material within normal caliber colon. No mechanical obstruction or free gas. 3.  Right renal cortical cysts, these require no specific follow-up. No urinary tract calculi, hydronephrosis or hydroureter. Prostatomegaly bulging into the bladder base. 4.  Normal cardiac size. Coronary artery calcifications. Hypodensity of the cardiac ventricles suggests anemia. Atherosclerotic and mildly ectatic abdominal aorta. No aneurysm. 5.  Mild diffuse body wall edema. Degenerative changes involving the spine and joints of the pelvis.          Assessment and Plan    Metastatic intrahepatic cholangiocarcinoma    Patient is here in follow-up.  He is 4 weeks out from his first cycle of chemotherapy with cisplatin gemcitabine and durvalumab.  He has had no significant improvement in his clinical status.  There are things that are worse.  He does not feel like eating and he is eating much less than what he was eating even before we started him on chemo and his oral intake is minimal.  He was very constipated but he finally had a bowel movement yesterday after using mag citrate I had an extensive discussion with the patient and his multiple family members about moving forward.  Patient and the family do want to transition to comfort care which I think is reasonable considering how much she has suffered in the last 3 to 4 weeks and has had no significant clinical improvement.  We will transition the patient to home hospice with plans for no further chemotherapy.   Patient and the family understand that he would continue to get weaker and would die soon but they want to focus on keeping him comfortable.    Hospice will be called today so he can be admitted to home hospice.  Will give him a liter of fluid today while he is at the cancer center.  Signed by: Aylin Gaviria MD        CC: Derrick Mckeon MD

## 2025-04-24 NOTE — PROGRESS NOTES
[facility]: Cancer Care Follow-Up Note                                    Discussion with Patient:                                                      Patient was at clinic today for follow up. He has elected to enroll in Hospice at this time due to his declining status. He lives in [place] and relayed that writer has called [facility], who would be able to accept him/enroll on 4/25 and [facility] who would be able to enroll today. Both agencies relayed that they do accept his insurance but to have to do the final step of running his insurance through the system for final verification. Provided the list to patient and family so they would research the agencies and make decision on where they would like the referral sent. Dr. Gaviria is willing to be supervising MD for hospice.  -Spoke with family and they would like to enroll in hospice with [facility]. Referral will be faxed to that agency and writer will also call the agency to close the loop. Contact information for Sofia will be added to the referral for them to  call to set up the consult/enrollment. Reviewed medications that he should take, for now, per Dr. Gaviria. Highlighted and reviewed on the med list.  Support and encouragement provided and invited calls if any questions or concerns arise. [facility].    -Called [facility] and  relayed that writer spoke with Michelle,  earlier today and verified that they had availability to accept Anuel for enrollment in hospice on 4/24/ They have elected [facility] for their care. Michelle did relay that they do accept his insurance carrier but will still need to run it through. Orders  (hospice order, insurance card, face sheet and last office note) faxed to [phone] and fax receipt confirmed with fax right on 4/24/25, [phone]. They will reach out to Sofia, wife, later today to set up visit with hospice    -Due to anticipated enrollment in  hospice, patient was un-enrolled for  Care coordination at this time. Future apts were cancelled. Will call in the near future to make sure that they have smoothly transitioned to hospice             Dates of Treatment:                                                      Infusion given in last 28 days       Administered MAR Action Medication Dose Rate Visit    03/27/2025 10:18 New Bag durvalumab (IMFINZI) 1,500 mg in sodium chloride 0.9 % 140 mL infusion 1,500 mg 140 mL/hr Infusion Therapy Visit on 03/27/2025 in [facility]    03/27/2025 11:20 New Bag gemcitabine (GEMZAR) 2,200 mg in sodium chloride 0.9 % 332.86 mL infusion 2,200 mg 665.7 mL/hr Infusion Therapy Visit on 03/27/2025 in [facility]    03/27/2025 11:54 New Bag CISplatin (PLATINOL) 43 mg in sodium chloride 0.9 % 318 mL infusion 43 mg 318 mL/hr Infusion Therapy Visit on 03/27/2025 in [facility]    04/03/2025 13:07 New Bag gemcitabine (GEMZAR) 2,200 mg in sodium chloride 0.9 % 332.86 mL infusion 2,200 mg 665.7 mL/hr Infusion Therapy Visit on 04/03/2025 in [facility]    04/03/2025 13:43 New Bag CISplatin (PLATINOL) 43 mg in sodium chloride 0.9 % 318 mL infusion 43 mg 318 mL/hr Infusion Therapy Visit on 04/03/2025 in [facility]            Assessment:                                                    Patient's functional status has declined, he has elected to enroll in hospice, orders faxed.    Intervention/Education provided during outreach:                                                       See above    Confirmed patient has clinic and triage numbers and will call if any needs or concerns arise    Signature:  Patricia Mack RN

## 2025-04-24 NOTE — PROGRESS NOTES
"Oncology Rooming Note    April 24, 2025 9:22 AM   David Samaniego is a 74 year old male who presents for:    Chief Complaint   Patient presents with    Oncology Clinic Visit       Cholangiocarcinoma        Initial Vitals: /73 (BP Location: Left arm, Patient Position: Sitting, Cuff Size: Adult Regular)   Pulse 83   Temp 96.9  F (36.1  C) (Tympanic)   Resp 18   Ht 1.943 m (6' 4.5\")   Wt 88.5 kg (195 lb)   SpO2 97%   BMI 23.43 kg/m   Estimated body mass index is 23.43 kg/m  as calculated from the following:    Height as of this encounter: 1.943 m (6' 4.5\").    Weight as of this encounter: 88.5 kg (195 lb). Body surface area is 2.19 meters squared.  Moderate Pain (6) Comment: Data Unavailable   No LMP for male patient.  Allergies reviewed: Yes  Medications reviewed: Yes    Medications: Medication refills not needed today.  Pharmacy name entered into [employer]:    [facility] [address] [id number] [address] AT [facility] [address]    Frailty Screening:   Is the patient here for a new oncology consult visit in cancer care? 2. No    PHQ9:  Did this patient require a PHQ9?: No      Clinical concerns:  1 week labs and treatment      Eli Remy            "

## 2025-05-05 ENCOUNTER — DOCUMENTATION ONLY (OUTPATIENT)
Dept: OTHER | Facility: CLINIC | Age: 75
End: 2025-05-05
Payer: COMMERCIAL

## 2025-05-17 ENCOUNTER — HEALTH MAINTENANCE LETTER (OUTPATIENT)
Age: 75
End: 2025-05-17

## (undated) DEVICE — CATH HEMATRUIG 22FR LUBRICA 2551H22

## (undated) DEVICE — TUBING SUCTION 10'X3/16" N510

## (undated) DEVICE — ENDO KIT COMPLIANCE DYKENDOCMPLY

## (undated) DEVICE — SLEEVE COMPRESSION SCD KNEE MED 74022

## (undated) DEVICE — PACK CYSTO SBA15CSFCA

## (undated) DEVICE — BAG URINARY DRAIN LEG MEDIUM 19OZ LF 150719

## (undated) DEVICE — ESU ELEC BAND RESECTION 24FR 30DEG W/CABLE WA22623C

## (undated) DEVICE — FASTENER LEGBAND CATHETER DALE 316

## (undated) DEVICE — GLOVE PROTEXIS POWDER FREE SMT 8.5 2D72PT85X

## (undated) DEVICE — PAD CHUX UNDERPAD 30X36" P3036C

## (undated) DEVICE — ESU GROUND PAD ADULT W/CORD E7507

## (undated) DEVICE — SUCTION MANIFOLD NEPTUNE 2 SYS 4 PORT 0702-020-000

## (undated) DEVICE — SOL WATER 1500ML

## (undated) DEVICE — ENDO BRUSH CHANNEL MASTER CLEANING 2-4.2MM BW-412T

## (undated) DEVICE — ESU ENDO FORCEP BX HOT FD-210U

## (undated) DEVICE — BAG URINARY DRAIN 4000ML LF 153509

## (undated) DEVICE — SYR 50ML CATH TIP W/O NDL 309620

## (undated) DEVICE — TUBING EXTENSION FOLEY CATH W/CONNECTOR 9346

## (undated) RX ORDER — LIDOCAINE HYDROCHLORIDE 20 MG/ML
INJECTION, SOLUTION EPIDURAL; INFILTRATION; INTRACAUDAL; PERINEURAL
Status: DISPENSED
Start: 2020-11-16

## (undated) RX ORDER — PROPOFOL 10 MG/ML
INJECTION, EMULSION INTRAVENOUS
Status: DISPENSED
Start: 2019-07-30

## (undated) RX ORDER — DEXAMETHASONE SODIUM PHOSPHATE 4 MG/ML
INJECTION, SOLUTION INTRA-ARTICULAR; INTRALESIONAL; INTRAMUSCULAR; INTRAVENOUS; SOFT TISSUE
Status: DISPENSED
Start: 2019-07-30

## (undated) RX ORDER — CEFAZOLIN SODIUM/WATER 2 G/20 ML
SYRINGE (ML) INTRAVENOUS
Status: DISPENSED
Start: 2025-03-10

## (undated) RX ORDER — ONDANSETRON 2 MG/ML
INJECTION INTRAMUSCULAR; INTRAVENOUS
Status: DISPENSED
Start: 2019-07-30

## (undated) RX ORDER — FENTANYL CITRATE 50 UG/ML
INJECTION, SOLUTION INTRAMUSCULAR; INTRAVENOUS
Status: DISPENSED
Start: 2019-07-30

## (undated) RX ORDER — FENTANYL CITRATE 50 UG/ML
INJECTION, SOLUTION INTRAMUSCULAR; INTRAVENOUS
Status: DISPENSED
Start: 2025-02-26

## (undated) RX ORDER — LIDOCAINE HYDROCHLORIDE 20 MG/ML
INJECTION, SOLUTION EPIDURAL; INFILTRATION; INTRACAUDAL; PERINEURAL
Status: DISPENSED
Start: 2019-07-30

## (undated) RX ORDER — PROPOFOL 10 MG/ML
INJECTION, EMULSION INTRAVENOUS
Status: DISPENSED
Start: 2020-11-16

## (undated) RX ORDER — ATROPA BELLADONNA AND OPIUM 16.2; 3 MG/1; MG/1
SUPPOSITORY RECTAL
Status: DISPENSED
Start: 2019-07-30

## (undated) RX ORDER — FENTANYL CITRATE 50 UG/ML
INJECTION, SOLUTION INTRAMUSCULAR; INTRAVENOUS
Status: DISPENSED
Start: 2025-03-10

## (undated) RX ORDER — LIDOCAINE HYDROCHLORIDE 10 MG/ML
INJECTION, SOLUTION EPIDURAL; INFILTRATION; INTRACAUDAL; PERINEURAL
Status: DISPENSED
Start: 2022-02-04

## (undated) RX ORDER — NEOMYCIN/BACITRACIN/POLYMYXINB 3.5-400-5K
OINTMENT (GRAM) TOPICAL
Status: DISPENSED
Start: 2022-02-04